# Patient Record
Sex: MALE | Race: WHITE | NOT HISPANIC OR LATINO | URBAN - METROPOLITAN AREA
[De-identification: names, ages, dates, MRNs, and addresses within clinical notes are randomized per-mention and may not be internally consistent; named-entity substitution may affect disease eponyms.]

---

## 2018-03-14 ENCOUNTER — APPOINTMENT (RX ONLY)
Dept: URBAN - METROPOLITAN AREA CLINIC 349 | Facility: CLINIC | Age: 58
Setting detail: DERMATOLOGY
End: 2018-03-14

## 2018-03-14 DIAGNOSIS — L81.2 FRECKLES: ICD-10-CM

## 2018-03-14 DIAGNOSIS — L57.0 ACTINIC KERATOSIS: ICD-10-CM

## 2018-03-14 DIAGNOSIS — D18.0 HEMANGIOMA: ICD-10-CM

## 2018-03-14 DIAGNOSIS — L57.8 OTHER SKIN CHANGES DUE TO CHRONIC EXPOSURE TO NONIONIZING RADIATION: ICD-10-CM

## 2018-03-14 DIAGNOSIS — D22 MELANOCYTIC NEVI: ICD-10-CM

## 2018-03-14 PROBLEM — D22.61 MELANOCYTIC NEVI OF RIGHT UPPER LIMB, INCLUDING SHOULDER: Status: ACTIVE | Noted: 2018-03-14

## 2018-03-14 PROBLEM — D22.5 MELANOCYTIC NEVI OF TRUNK: Status: ACTIVE | Noted: 2018-03-14

## 2018-03-14 PROBLEM — D18.01 HEMANGIOMA OF SKIN AND SUBCUTANEOUS TISSUE: Status: ACTIVE | Noted: 2018-03-14

## 2018-03-14 PROCEDURE — ? COUNSELING

## 2018-03-14 PROCEDURE — ? LIQUID NITROGEN

## 2018-03-14 PROCEDURE — 99202 OFFICE O/P NEW SF 15 MIN: CPT | Mod: 25

## 2018-03-14 PROCEDURE — 17003 DESTRUCT PREMALG LES 2-14: CPT

## 2018-03-14 PROCEDURE — 17000 DESTRUCT PREMALG LESION: CPT

## 2018-03-14 ASSESSMENT — LOCATION DETAILED DESCRIPTION DERM
LOCATION DETAILED: POSTERIOR MID-PARIETAL SCALP
LOCATION DETAILED: RIGHT PROXIMAL DORSAL FOREARM
LOCATION DETAILED: RIGHT RADIAL DORSAL HAND
LOCATION DETAILED: LEFT ANTERIOR SHOULDER
LOCATION DETAILED: LEFT LATERAL FOREHEAD
LOCATION DETAILED: LEFT CENTRAL MALAR CHEEK
LOCATION DETAILED: LEFT VENTRAL PROXIMAL FOREARM
LOCATION DETAILED: XIPHOID
LOCATION DETAILED: RIGHT CENTRAL MALAR CHEEK
LOCATION DETAILED: EPIGASTRIC SKIN
LOCATION DETAILED: LEFT CHIN
LOCATION DETAILED: LEFT INFERIOR UPPER BACK
LOCATION DETAILED: RIGHT DISTAL DORSAL FOREARM
LOCATION DETAILED: RIGHT MEDIAL FRONTAL SCALP
LOCATION DETAILED: RIGHT LATERAL SUPERIOR CHEST
LOCATION DETAILED: RIGHT MEDIAL INFERIOR CHEST
LOCATION DETAILED: LEFT ULNAR DORSAL HAND
LOCATION DETAILED: PERIUMBILICAL SKIN
LOCATION DETAILED: LEFT POSTERIOR SHOULDER
LOCATION DETAILED: LEFT PROXIMAL DORSAL FOREARM
LOCATION DETAILED: RIGHT SUPERIOR PARIETAL SCALP
LOCATION DETAILED: RIGHT POSTERIOR SHOULDER
LOCATION DETAILED: INFERIOR MID FOREHEAD
LOCATION DETAILED: LEFT SUPERIOR MEDIAL UPPER BACK
LOCATION DETAILED: LEFT MEDIAL UPPER BACK

## 2018-03-14 ASSESSMENT — LOCATION ZONE DERM
LOCATION ZONE: TRUNK
LOCATION ZONE: FACE
LOCATION ZONE: HAND
LOCATION ZONE: SCALP
LOCATION ZONE: ARM

## 2018-03-14 ASSESSMENT — LOCATION SIMPLE DESCRIPTION DERM
LOCATION SIMPLE: LEFT SHOULDER
LOCATION SIMPLE: RIGHT SCALP
LOCATION SIMPLE: INFERIOR FOREHEAD
LOCATION SIMPLE: RIGHT CHEEK
LOCATION SIMPLE: LEFT HAND
LOCATION SIMPLE: LEFT FOREARM
LOCATION SIMPLE: LEFT FOREHEAD
LOCATION SIMPLE: LEFT UPPER BACK
LOCATION SIMPLE: CHIN
LOCATION SIMPLE: RIGHT FOREARM
LOCATION SIMPLE: ABDOMEN
LOCATION SIMPLE: RIGHT SHOULDER
LOCATION SIMPLE: RIGHT HAND
LOCATION SIMPLE: LEFT CHEEK
LOCATION SIMPLE: POSTERIOR SCALP
LOCATION SIMPLE: SCALP
LOCATION SIMPLE: CHEST

## 2018-03-14 NOTE — PROCEDURE: LIQUID NITROGEN
Detail Level: Zone
Post-Care Instructions: I reviewed with the patient in detail post-care instructions. Patient is to wear sunprotection, and avoid picking at any of the treated lesions. Pt may apply Vaseline to crusted or scabbing areas.
Render Post-Care Instructions In Note?: no
Number Of Freeze-Thaw Cycles: 2 freeze-thaw cycles
Consent: The patient's consent was obtained including but not limited to risks of crusting, scabbing, blistering, scarring, darker or lighter pigmentary change, recurrence, incomplete removal and infection.
Duration Of Freeze Thaw-Cycle (Seconds): 3

## 2018-11-08 ENCOUNTER — APPOINTMENT (RX ONLY)
Dept: URBAN - METROPOLITAN AREA CLINIC 349 | Facility: CLINIC | Age: 58
Setting detail: DERMATOLOGY
End: 2018-11-08

## 2018-11-08 DIAGNOSIS — D22 MELANOCYTIC NEVI: ICD-10-CM

## 2018-11-08 DIAGNOSIS — D18.0 HEMANGIOMA: ICD-10-CM

## 2018-11-08 DIAGNOSIS — L57.8 OTHER SKIN CHANGES DUE TO CHRONIC EXPOSURE TO NONIONIZING RADIATION: ICD-10-CM

## 2018-11-08 DIAGNOSIS — L57.0 ACTINIC KERATOSIS: ICD-10-CM

## 2018-11-08 PROBLEM — D18.01 HEMANGIOMA OF SKIN AND SUBCUTANEOUS TISSUE: Status: ACTIVE | Noted: 2018-11-08

## 2018-11-08 PROBLEM — D22.39 MELANOCYTIC NEVI OF OTHER PARTS OF FACE: Status: ACTIVE | Noted: 2018-11-08

## 2018-11-08 PROBLEM — K21.9 GASTRO-ESOPHAGEAL REFLUX DISEASE WITHOUT ESOPHAGITIS: Status: ACTIVE | Noted: 2018-11-08

## 2018-11-08 PROBLEM — D22.5 MELANOCYTIC NEVI OF TRUNK: Status: ACTIVE | Noted: 2018-11-08

## 2018-11-08 PROBLEM — D22.61 MELANOCYTIC NEVI OF RIGHT UPPER LIMB, INCLUDING SHOULDER: Status: ACTIVE | Noted: 2018-11-08

## 2018-11-08 PROCEDURE — A4550 SURGICAL TRAYS: HCPCS

## 2018-11-08 PROCEDURE — ? PATHOLOGY BILLING

## 2018-11-08 PROCEDURE — 17000 DESTRUCT PREMALG LESION: CPT

## 2018-11-08 PROCEDURE — 11311 SHAVE SKIN LESION 0.6-1.0 CM: CPT | Mod: 59

## 2018-11-08 PROCEDURE — ? SHAVE REMOVAL

## 2018-11-08 PROCEDURE — 17003 DESTRUCT PREMALG LES 2-14: CPT

## 2018-11-08 PROCEDURE — ? LIQUID NITROGEN

## 2018-11-08 PROCEDURE — 88305 TISSUE EXAM BY PATHOLOGIST: CPT

## 2018-11-08 PROCEDURE — ? COUNSELING

## 2018-11-08 ASSESSMENT — LOCATION SIMPLE DESCRIPTION DERM
LOCATION SIMPLE: LEFT UPPER BACK
LOCATION SIMPLE: RIGHT CHEEK
LOCATION SIMPLE: CHIN
LOCATION SIMPLE: RIGHT FOREARM
LOCATION SIMPLE: LEFT CHEEK
LOCATION SIMPLE: RIGHT HAND
LOCATION SIMPLE: RIGHT FOREARM
LOCATION SIMPLE: LEFT FOREARM
LOCATION SIMPLE: LEFT HAND
LOCATION SIMPLE: LEFT FOREARM
LOCATION SIMPLE: INFERIOR FOREHEAD
LOCATION SIMPLE: CHEST
LOCATION SIMPLE: LEFT FOREHEAD
LOCATION SIMPLE: LEFT FOREHEAD
LOCATION SIMPLE: ABDOMEN
LOCATION SIMPLE: RIGHT SHOULDER

## 2018-11-08 ASSESSMENT — LOCATION ZONE DERM
LOCATION ZONE: ARM
LOCATION ZONE: TRUNK
LOCATION ZONE: HAND
LOCATION ZONE: FACE
LOCATION ZONE: FACE
LOCATION ZONE: ARM

## 2018-11-08 ASSESSMENT — LOCATION DETAILED DESCRIPTION DERM
LOCATION DETAILED: RIGHT POSTERIOR SHOULDER
LOCATION DETAILED: LEFT SUPERIOR MEDIAL UPPER BACK
LOCATION DETAILED: RIGHT PROXIMAL DORSAL FOREARM
LOCATION DETAILED: LEFT ULNAR DORSAL HAND
LOCATION DETAILED: RIGHT MEDIAL INFERIOR CHEST
LOCATION DETAILED: LEFT INFERIOR LATERAL FOREHEAD
LOCATION DETAILED: LEFT PROXIMAL DORSAL FOREARM
LOCATION DETAILED: LEFT MEDIAL UPPER BACK
LOCATION DETAILED: RIGHT RADIAL DORSAL HAND
LOCATION DETAILED: LEFT CHIN
LOCATION DETAILED: LEFT CENTRAL MALAR CHEEK
LOCATION DETAILED: LEFT PROXIMAL DORSAL FOREARM
LOCATION DETAILED: RIGHT CENTRAL MALAR CHEEK
LOCATION DETAILED: INFERIOR MID FOREHEAD
LOCATION DETAILED: LEFT INFERIOR UPPER BACK
LOCATION DETAILED: LEFT DISTAL DORSAL FOREARM
LOCATION DETAILED: RIGHT PROXIMAL DORSAL FOREARM
LOCATION DETAILED: LEFT DISTAL DORSAL FOREARM
LOCATION DETAILED: LEFT INFERIOR LATERAL FOREHEAD
LOCATION DETAILED: PERIUMBILICAL SKIN
LOCATION DETAILED: EPIGASTRIC SKIN

## 2018-11-08 NOTE — PROCEDURE: LIQUID NITROGEN
Duration Of Freeze Thaw-Cycle (Seconds): 3
Detail Level: Detailed
Post-Care Instructions: I reviewed with the patient in detail post-care instructions. Patient is to wear sunprotection, and avoid picking at any of the treated lesions. Pt may apply Vaseline to crusted or scabbing areas.
Consent: The patient's consent was obtained including but not limited to risks of crusting, scabbing, blistering, scarring, darker or lighter pigmentary change, recurrence, incomplete removal and infection.
Render Post-Care Instructions In Note?: no
Number Of Freeze-Thaw Cycles: 2 freeze-thaw cycles

## 2018-11-08 NOTE — PROCEDURE: PATHOLOGY BILLING
Immunohistochemistry ([id number] and [id number]) billing is not performed here. Please use the Immunohistochemistry Stain Billing plan to accomplish this. Immunohistochemistry ([id number] and [id number]) billing is not performed here. Please use the Immunohistochemistry Stain Billing plan to accomplish this.

## 2018-11-08 NOTE — PROCEDURE: SHAVE REMOVAL
Medical Necessity Information: It is in your best interest to select a reason for this procedure from the list below. All of these items fulfill various CMS LCD requirements except the new and changing color options.
Accession #: dr medrano read
Render Post-Care Instructions In Note?: yes
Biopsy Method: Dermablade
Detail Level: Detailed
Medical Necessity Clause: This procedure was medically necessary because the lesion that was treated was: changing
Consent was obtained from the patient. The risks and benefits to therapy were discussed in detail. Specifically, the risks of infection, scarring, bleeding, prolonged wound healing, incomplete removal, allergy to anesthesia, nerve injury and recurrence were addressed. Prior to the procedure, the treatment site was clearly identified and confirmed by the patient. All components of Universal Protocol/PAUSE Rule completed.
X Size Of Lesion In Cm (Optional): 0
Hemostasis: Electrocautery
Anesthesia Volume In Cc: 0.5
Notification Instructions: Patient will be notified of biopsy results. However, patient instructed to call the office if not contacted within 2 weeks.
Wound Care: Vaseline
Billing Type: Third-Party Bill
Post-Care Instructions: I reviewed with the patient in detail post-care instructions. Patient is to keep the biopsy site dry overnight, and then apply vaseline twice daily until healed. Patient may apply hydrogen peroxide soaks to remove any crusting. After the procedure, the patient was observed for 5-10 minutes and was oriented to person, place and time and demied feeling dizzy, queasy, and stated that they did not feel that they were going to faint.
Anesthesia Type: 2% lidocaine with epinephrine
Bill 53823 For Specimen Handling/Conveyance To Laboratory?: no
Size Of Lesion In Cm (Required): 0.8

## 2019-07-31 ENCOUNTER — APPOINTMENT (RX ONLY)
Dept: URBAN - METROPOLITAN AREA CLINIC 349 | Facility: CLINIC | Age: 59
Setting detail: DERMATOLOGY
End: 2019-07-31

## 2019-07-31 DIAGNOSIS — L82.1 OTHER SEBORRHEIC KERATOSIS: ICD-10-CM

## 2019-07-31 DIAGNOSIS — D22 MELANOCYTIC NEVI: ICD-10-CM

## 2019-07-31 DIAGNOSIS — L82.0 INFLAMED SEBORRHEIC KERATOSIS: ICD-10-CM

## 2019-07-31 DIAGNOSIS — D18.0 HEMANGIOMA: ICD-10-CM

## 2019-07-31 DIAGNOSIS — L57.0 ACTINIC KERATOSIS: ICD-10-CM

## 2019-07-31 PROBLEM — D22.61 MELANOCYTIC NEVI OF RIGHT UPPER LIMB, INCLUDING SHOULDER: Status: ACTIVE | Noted: 2019-07-31

## 2019-07-31 PROBLEM — L85.3 XEROSIS CUTIS: Status: ACTIVE | Noted: 2019-07-31

## 2019-07-31 PROBLEM — D22.5 MELANOCYTIC NEVI OF TRUNK: Status: ACTIVE | Noted: 2019-07-31

## 2019-07-31 PROBLEM — D18.01 HEMANGIOMA OF SKIN AND SUBCUTANEOUS TISSUE: Status: ACTIVE | Noted: 2019-07-31

## 2019-07-31 PROCEDURE — 17000 DESTRUCT PREMALG LESION: CPT | Mod: 59

## 2019-07-31 PROCEDURE — ? LIQUID NITROGEN

## 2019-07-31 PROCEDURE — 17110 DESTRUCTION B9 LES UP TO 14: CPT

## 2019-07-31 PROCEDURE — 17003 DESTRUCT PREMALG LES 2-14: CPT | Mod: 59

## 2019-07-31 PROCEDURE — ? COUNSELING

## 2019-07-31 PROCEDURE — 99213 OFFICE O/P EST LOW 20 MIN: CPT | Mod: 25

## 2019-07-31 ASSESSMENT — LOCATION SIMPLE DESCRIPTION DERM
LOCATION SIMPLE: LEFT THIGH
LOCATION SIMPLE: ABDOMEN
LOCATION SIMPLE: LEFT THIGH
LOCATION SIMPLE: ANTERIOR SCALP
LOCATION SIMPLE: LEFT UPPER BACK
LOCATION SIMPLE: LEFT SCALP
LOCATION SIMPLE: RIGHT SHOULDER
LOCATION SIMPLE: RIGHT SCALP
LOCATION SIMPLE: LEFT SCALP
LOCATION SIMPLE: SCALP

## 2019-07-31 ASSESSMENT — LOCATION DETAILED DESCRIPTION DERM
LOCATION DETAILED: LEFT CENTRAL FRONTAL SCALP
LOCATION DETAILED: MID-FRONTAL SCALP
LOCATION DETAILED: RIGHT SUPERIOR PARIETAL SCALP
LOCATION DETAILED: PERIUMBILICAL SKIN
LOCATION DETAILED: LEFT MEDIAL UPPER BACK
LOCATION DETAILED: RIGHT POSTERIOR SHOULDER
LOCATION DETAILED: LEFT ANTERIOR DISTAL THIGH
LOCATION DETAILED: LEFT INFERIOR UPPER BACK
LOCATION DETAILED: LEFT ANTERIOR DISTAL THIGH
LOCATION DETAILED: RIGHT MEDIAL FRONTAL SCALP
LOCATION DETAILED: LEFT MEDIAL FRONTAL SCALP
LOCATION DETAILED: EPIGASTRIC SKIN

## 2019-07-31 ASSESSMENT — LOCATION ZONE DERM
LOCATION ZONE: SCALP
LOCATION ZONE: TRUNK
LOCATION ZONE: ARM
LOCATION ZONE: LEG
LOCATION ZONE: SCALP
LOCATION ZONE: LEG

## 2019-07-31 NOTE — PROCEDURE: LIQUID NITROGEN
Post-Care Instructions: I reviewed with the patient in detail post-care instructions. Patient is to wear sunprotection, and avoid picking at any of the treated lesions. Pt may apply Vaseline to crusted or scabbing areas.
Render Note In Bullet Format When Appropriate: No
Duration Of Freeze Thaw-Cycle (Seconds): 2
Detail Level: Detailed
Medical Necessity Information: It is in your best interest to select a reason for this procedure from the list below. All of these items fulfill various CMS LCD requirements except the new and changing color options.
Consent: The patient's consent was obtained including but not limited to risks of crusting, scabbing, blistering, scarring, darker or lighter pigmentary change, recurrence, incomplete removal and infection.
Duration Of Freeze Thaw-Cycle (Seconds): 3
Include Z78.9 (Other Specified Conditions Influencing Health Status) As An Associated Diagnosis?: Yes
Medical Necessity Clause: This procedure was medically necessary because the lesions that were treated were:
Number Of Freeze-Thaw Cycles: 3 freeze-thaw cycles
Number Of Freeze-Thaw Cycles: 2 freeze-thaw cycles

## 2021-08-13 ENCOUNTER — HOSPITAL ENCOUNTER (OUTPATIENT)
Age: 61
Setting detail: OBSERVATION
Discharge: HOME | End: 2021-08-14
Attending: EMERGENCY MEDICINE | Admitting: INTERNAL MEDICINE
Payer: COMMERCIAL

## 2021-08-13 ENCOUNTER — APPOINTMENT (OUTPATIENT)
Dept: NON INVASIVE DIAGNOSTICS | Age: 61
End: 2021-08-13
Attending: NURSE PRACTITIONER
Payer: COMMERCIAL

## 2021-08-13 ENCOUNTER — APPOINTMENT (OUTPATIENT)
Dept: GENERAL RADIOLOGY | Age: 61
End: 2021-08-13
Attending: EMERGENCY MEDICINE
Payer: COMMERCIAL

## 2021-08-13 DIAGNOSIS — I10 ESSENTIAL HYPERTENSION: ICD-10-CM

## 2021-08-13 DIAGNOSIS — R07.9 CHEST PAIN, UNSPECIFIED TYPE: ICD-10-CM

## 2021-08-13 DIAGNOSIS — I24.9 ACS (ACUTE CORONARY SYNDROME) (HCC): Primary | ICD-10-CM

## 2021-08-13 LAB
ACT BLD: 252 SECS (ref 70–128)
ALBUMIN SERPL-MCNC: 3.9 G/DL (ref 3.5–5)
ALBUMIN/GLOB SERPL: 1.1 {RATIO} (ref 1.2–3.5)
ALP SERPL-CCNC: 74 U/L (ref 50–136)
ALT SERPL-CCNC: 27 U/L (ref 12–65)
ANION GAP SERPL CALC-SCNC: 8 MMOL/L (ref 7–16)
AST SERPL-CCNC: 15 U/L (ref 15–37)
ATRIAL RATE: 77 BPM
BASOPHILS # BLD: 0.1 K/UL (ref 0–0.2)
BASOPHILS NFR BLD: 1 % (ref 0–2)
BILIRUB SERPL-MCNC: 0.4 MG/DL (ref 0.2–1.1)
BNP SERPL-MCNC: 26 PG/ML (ref 5–125)
BUN SERPL-MCNC: 19 MG/DL (ref 6–23)
CALCIUM SERPL-MCNC: 9.1 MG/DL (ref 8.3–10.4)
CALCULATED P AXIS, ECG09: 37 DEGREES
CALCULATED R AXIS, ECG10: 49 DEGREES
CALCULATED T AXIS, ECG11: 53 DEGREES
CHLORIDE SERPL-SCNC: 106 MMOL/L (ref 98–107)
CO2 SERPL-SCNC: 26 MMOL/L (ref 21–32)
CREAT SERPL-MCNC: 1.36 MG/DL (ref 0.8–1.5)
DIAGNOSIS, 93000: NORMAL
DIFFERENTIAL METHOD BLD: NORMAL
ECHO AO ASC DIAM: 3.4 CM
ECHO AO ROOT DIAM: 3.4 CM
ECHO AV AREA PEAK VELOCITY: 2.64 CM2
ECHO AV AREA VTI: 2.82 CM2
ECHO AV AREA/BSA PEAK VELOCITY: 1.1 CM2/M2
ECHO AV AREA/BSA VTI: 1.2 CM2/M2
ECHO AV MEAN GRADIENT: 4 MMHG
ECHO AV PEAK GRADIENT: 7 MMHG
ECHO AV PEAK VELOCITY: 133 CM/S
ECHO AV VTI: 28.7 CM
ECHO EST RA PRESSURE: 3 MMHG
ECHO LA AREA 2C: 18.4 CM2
ECHO LA AREA 4C: 17.1 CM2
ECHO LA MAJOR AXIS: 5.47 CM
ECHO LA MINOR AXIS: 2.3 CM
ECHO LV E' LATERAL VELOCITY: 8.12 CM/S
ECHO LV E' SEPTAL VELOCITY: 8.61 CM/S
ECHO LV EDV A2C: 119 CM3
ECHO LV EDV A4C: 138 CM3
ECHO LV ESV A2C: 61.3 CM3
ECHO LV ESV A4C: 64.3 CM3
ECHO LV INTERNAL DIMENSION DIASTOLIC: 4.5 CM (ref 4.2–5.9)
ECHO LV INTERNAL DIMENSION SYSTOLIC: 3.4 CM
ECHO LV IVSD: 1.1 CM (ref 0.6–1)
ECHO LV MASS 2D: 175 G (ref 88–224)
ECHO LV MASS INDEX 2D: 73.5 G/M2 (ref 49–115)
ECHO LV POSTERIOR WALL DIASTOLIC: 1.1 CM (ref 0.6–1)
ECHO LVOT DIAM: 2.2 CM
ECHO LVOT PEAK GRADIENT: 3 MMHG
ECHO LVOT VTI: 21.3 CM
ECHO MV A VELOCITY: 87.3 CM/S
ECHO MV E VELOCITY: 72.3 CM/S
ECHO MV E/A RATIO: 0.83
ECHO MV E/E' LATERAL: 8.9
ECHO MV E/E' RATIO (AVERAGED): 8.65
ECHO MV E/E' SEPTAL: 8.4
ECHO RIGHT VENTRICULAR SYSTOLIC PRESSURE (RVSP): 33 MMHG
ECHO RV TAPSE: 2.97 CM (ref 1.5–2)
ECHO TV REGURGITANT MAX VELOCITY: 272 CM/S
ECHO TV REGURGITANT PEAK GRADIENT: 30 MMHG
EOSINOPHIL # BLD: 0.3 K/UL (ref 0–0.8)
EOSINOPHIL NFR BLD: 4 % (ref 0.5–7.8)
ERYTHROCYTE [DISTWIDTH] IN BLOOD BY AUTOMATED COUNT: 11.9 % (ref 11.9–14.6)
GLOBULIN SER CALC-MCNC: 3.6 G/DL (ref 2.3–3.5)
GLUCOSE SERPL-MCNC: 121 MG/DL (ref 65–100)
HCT VFR BLD AUTO: 45.5 % (ref 41.1–50.3)
HGB BLD-MCNC: 15.5 G/DL (ref 13.6–17.2)
IMM GRANULOCYTES # BLD AUTO: 0 K/UL (ref 0–0.5)
IMM GRANULOCYTES NFR BLD AUTO: 0 % (ref 0–5)
LIPASE SERPL-CCNC: 109 U/L (ref 73–393)
LYMPHOCYTES # BLD: 2.7 K/UL (ref 0.5–4.6)
LYMPHOCYTES NFR BLD: 39 % (ref 13–44)
MAGNESIUM SERPL-MCNC: 2.2 MG/DL (ref 1.8–2.4)
MCH RBC QN AUTO: 31.3 PG (ref 26.1–32.9)
MCHC RBC AUTO-ENTMCNC: 34.1 G/DL (ref 31.4–35)
MCV RBC AUTO: 91.7 FL (ref 79.6–97.8)
MONOCYTES # BLD: 0.7 K/UL (ref 0.1–1.3)
MONOCYTES NFR BLD: 11 % (ref 4–12)
NEUTS SEG # BLD: 3.1 K/UL (ref 1.7–8.2)
NEUTS SEG NFR BLD: 45 % (ref 43–78)
NRBC # BLD: 0 K/UL (ref 0–0.2)
P-R INTERVAL, ECG05: 152 MS
PLATELET # BLD AUTO: 252 K/UL (ref 150–450)
PMV BLD AUTO: 9.6 FL (ref 9.4–12.3)
POTASSIUM SERPL-SCNC: 3.9 MMOL/L (ref 3.5–5.1)
PROT SERPL-MCNC: 7.5 G/DL (ref 6.3–8.2)
Q-T INTERVAL, ECG07: 356 MS
QRS DURATION, ECG06: 88 MS
QTC CALCULATION (BEZET), ECG08: 402 MS
RBC # BLD AUTO: 4.96 M/UL (ref 4.23–5.6)
SODIUM SERPL-SCNC: 140 MMOL/L (ref 138–145)
TROPONIN-HIGH SENSITIVITY: 110.7 PG/ML (ref 0–14)
TROPONIN-HIGH SENSITIVITY: 2035.3 PG/ML (ref 0–14)
UFH PPP CHRO-ACNC: <0.1 IU/ML (ref 0.3–0.7)
VENTRICULAR RATE, ECG03: 77 BPM
WBC # BLD AUTO: 6.9 K/UL (ref 4.3–11.1)

## 2021-08-13 PROCEDURE — 83880 ASSAY OF NATRIURETIC PEPTIDE: CPT

## 2021-08-13 PROCEDURE — 74011250637 HC RX REV CODE- 250/637: Performed by: EMERGENCY MEDICINE

## 2021-08-13 PROCEDURE — 96376 TX/PRO/DX INJ SAME DRUG ADON: CPT

## 2021-08-13 PROCEDURE — C8929 TTE W OR WO FOL WCON,DOPPLER: HCPCS

## 2021-08-13 PROCEDURE — C1769 GUIDE WIRE: HCPCS | Performed by: INTERNAL MEDICINE

## 2021-08-13 PROCEDURE — 74011250636 HC RX REV CODE- 250/636: Performed by: INTERNAL MEDICINE

## 2021-08-13 PROCEDURE — 83690 ASSAY OF LIPASE: CPT

## 2021-08-13 PROCEDURE — 93571 IV DOP VEL&/PRESS C FLO 1ST: CPT | Performed by: INTERNAL MEDICINE

## 2021-08-13 PROCEDURE — 93458 L HRT ARTERY/VENTRICLE ANGIO: CPT | Performed by: INTERNAL MEDICINE

## 2021-08-13 PROCEDURE — 92921: CPT | Performed by: INTERNAL MEDICINE

## 2021-08-13 PROCEDURE — 85347 COAGULATION TIME ACTIVATED: CPT

## 2021-08-13 PROCEDURE — C1894 INTRO/SHEATH, NON-LASER: HCPCS | Performed by: INTERNAL MEDICINE

## 2021-08-13 PROCEDURE — 85025 COMPLETE CBC W/AUTO DIFF WBC: CPT

## 2021-08-13 PROCEDURE — C1887 CATHETER, GUIDING: HCPCS | Performed by: INTERNAL MEDICINE

## 2021-08-13 PROCEDURE — C1874 STENT, COATED/COV W/DEL SYS: HCPCS | Performed by: INTERNAL MEDICINE

## 2021-08-13 PROCEDURE — 96365 THER/PROPH/DIAG IV INF INIT: CPT

## 2021-08-13 PROCEDURE — 80053 COMPREHEN METABOLIC PANEL: CPT

## 2021-08-13 PROCEDURE — C1725 CATH, TRANSLUMIN NON-LASER: HCPCS | Performed by: INTERNAL MEDICINE

## 2021-08-13 PROCEDURE — 83735 ASSAY OF MAGNESIUM: CPT

## 2021-08-13 PROCEDURE — 74011250637 HC RX REV CODE- 250/637: Performed by: INTERNAL MEDICINE

## 2021-08-13 PROCEDURE — 36415 COLL VENOUS BLD VENIPUNCTURE: CPT

## 2021-08-13 PROCEDURE — 99218 HC RM OBSERVATION: CPT

## 2021-08-13 PROCEDURE — 77030042317 HC BND COMPR HEMSTAT -B: Performed by: INTERNAL MEDICINE

## 2021-08-13 PROCEDURE — 74011250636 HC RX REV CODE- 250/636: Performed by: NURSE PRACTITIONER

## 2021-08-13 PROCEDURE — 85520 HEPARIN ASSAY: CPT

## 2021-08-13 PROCEDURE — 77030012468 HC VLV BLEEDBK CNTRL ABBT -B: Performed by: INTERNAL MEDICINE

## 2021-08-13 PROCEDURE — 74011250637 HC RX REV CODE- 250/637: Performed by: NURSE PRACTITIONER

## 2021-08-13 PROCEDURE — 74011000250 HC RX REV CODE- 250: Performed by: INTERNAL MEDICINE

## 2021-08-13 PROCEDURE — 77030004558 HC CATH ANGI DX SUPR TORQ CARD -A: Performed by: INTERNAL MEDICINE

## 2021-08-13 PROCEDURE — 99152 MOD SED SAME PHYS/QHP 5/>YRS: CPT | Performed by: INTERNAL MEDICINE

## 2021-08-13 PROCEDURE — 99284 EMERGENCY DEPT VISIT MOD MDM: CPT

## 2021-08-13 PROCEDURE — 99220 PR INITIAL OBSERVATION CARE/DAY 70 MINUTES: CPT | Performed by: INTERNAL MEDICINE

## 2021-08-13 PROCEDURE — 92928 PRQ TCAT PLMT NTRAC ST 1 LES: CPT | Performed by: INTERNAL MEDICINE

## 2021-08-13 PROCEDURE — 93572 IV DOP VEL&/PRESS C FLO EA: CPT | Performed by: INTERNAL MEDICINE

## 2021-08-13 PROCEDURE — 71045 X-RAY EXAM CHEST 1 VIEW: CPT

## 2021-08-13 PROCEDURE — 84484 ASSAY OF TROPONIN QUANT: CPT

## 2021-08-13 PROCEDURE — 99153 MOD SED SAME PHYS/QHP EA: CPT | Performed by: INTERNAL MEDICINE

## 2021-08-13 PROCEDURE — 74011000636 HC RX REV CODE- 636: Performed by: INTERNAL MEDICINE

## 2021-08-13 PROCEDURE — 96374 THER/PROPH/DIAG INJ IV PUSH: CPT

## 2021-08-13 PROCEDURE — 93005 ELECTROCARDIOGRAM TRACING: CPT | Performed by: INTERNAL MEDICINE

## 2021-08-13 PROCEDURE — 74011250636 HC RX REV CODE- 250/636: Performed by: EMERGENCY MEDICINE

## 2021-08-13 DEVICE — STENT RONYX25038UX RESOLUTE ONYX 2.50X38
Type: IMPLANTABLE DEVICE | Status: FUNCTIONAL
Brand: RESOLUTE ONYX™

## 2021-08-13 RX ORDER — SODIUM CHLORIDE 9 MG/ML
75 INJECTION, SOLUTION INTRAVENOUS CONTINUOUS
Status: DISCONTINUED | OUTPATIENT
Start: 2021-08-13 | End: 2021-08-14 | Stop reason: HOSPADM

## 2021-08-13 RX ORDER — SODIUM CHLORIDE 0.9 % (FLUSH) 0.9 %
5-40 SYRINGE (ML) INJECTION EVERY 8 HOURS
Status: DISCONTINUED | OUTPATIENT
Start: 2021-08-13 | End: 2021-08-14 | Stop reason: HOSPADM

## 2021-08-13 RX ORDER — ACETAMINOPHEN 325 MG/1
650 TABLET ORAL
Status: DISCONTINUED | OUTPATIENT
Start: 2021-08-13 | End: 2021-08-14 | Stop reason: SDUPTHER

## 2021-08-13 RX ORDER — LORAZEPAM 1 MG/1
1 TABLET ORAL
Status: DISCONTINUED | OUTPATIENT
Start: 2021-08-13 | End: 2021-08-14 | Stop reason: HOSPADM

## 2021-08-13 RX ORDER — LIDOCAINE HYDROCHLORIDE 10 MG/ML
INJECTION INFILTRATION; PERINEURAL AS NEEDED
Status: DISCONTINUED | OUTPATIENT
Start: 2021-08-13 | End: 2021-08-13 | Stop reason: HOSPADM

## 2021-08-13 RX ORDER — MORPHINE SULFATE 2 MG/ML
1 INJECTION, SOLUTION INTRAMUSCULAR; INTRAVENOUS
Status: DISCONTINUED | OUTPATIENT
Start: 2021-08-13 | End: 2021-08-14 | Stop reason: HOSPADM

## 2021-08-13 RX ORDER — ACETAMINOPHEN 325 MG/1
650 TABLET ORAL
Status: DISCONTINUED | OUTPATIENT
Start: 2021-08-13 | End: 2021-08-14 | Stop reason: HOSPADM

## 2021-08-13 RX ORDER — HEPARIN SODIUM 5000 [USP'U]/ML
60 INJECTION, SOLUTION INTRAVENOUS; SUBCUTANEOUS ONCE
Status: COMPLETED | OUTPATIENT
Start: 2021-08-13 | End: 2021-08-13

## 2021-08-13 RX ORDER — GUAIFENESIN 100 MG/5ML
81 LIQUID (ML) ORAL DAILY
Status: DISCONTINUED | OUTPATIENT
Start: 2021-08-14 | End: 2021-08-14 | Stop reason: HOSPADM

## 2021-08-13 RX ORDER — ATORVASTATIN CALCIUM 80 MG/1
80 TABLET, FILM COATED ORAL DAILY
Status: DISCONTINUED | OUTPATIENT
Start: 2021-08-14 | End: 2021-08-14 | Stop reason: HOSPADM

## 2021-08-13 RX ORDER — SODIUM CHLORIDE 0.9 % (FLUSH) 0.9 %
5-10 SYRINGE (ML) INJECTION EVERY 8 HOURS
Status: DISCONTINUED | OUTPATIENT
Start: 2021-08-13 | End: 2021-08-14 | Stop reason: HOSPADM

## 2021-08-13 RX ORDER — METOPROLOL TARTRATE 25 MG/1
25 TABLET, FILM COATED ORAL EVERY 12 HOURS
Status: DISCONTINUED | OUTPATIENT
Start: 2021-08-13 | End: 2021-08-14 | Stop reason: HOSPADM

## 2021-08-13 RX ORDER — SODIUM CHLORIDE 0.9 % (FLUSH) 0.9 %
5-40 SYRINGE (ML) INJECTION AS NEEDED
Status: DISCONTINUED | OUTPATIENT
Start: 2021-08-13 | End: 2021-08-14 | Stop reason: HOSPADM

## 2021-08-13 RX ORDER — HEPARIN SODIUM 10000 [USP'U]/ML
INJECTION, SOLUTION INTRAVENOUS; SUBCUTANEOUS AS NEEDED
Status: DISCONTINUED | OUTPATIENT
Start: 2021-08-13 | End: 2021-08-13 | Stop reason: HOSPADM

## 2021-08-13 RX ORDER — GUAIFENESIN 100 MG/5ML
81 LIQUID (ML) ORAL DAILY
Status: DISCONTINUED | OUTPATIENT
Start: 2021-08-14 | End: 2021-08-14

## 2021-08-13 RX ORDER — SODIUM CHLORIDE 0.9 % (FLUSH) 0.9 %
5-10 SYRINGE (ML) INJECTION AS NEEDED
Status: DISCONTINUED | OUTPATIENT
Start: 2021-08-13 | End: 2021-08-14 | Stop reason: HOSPADM

## 2021-08-13 RX ORDER — SODIUM CHLORIDE 9 MG/ML
75 INJECTION, SOLUTION INTRAVENOUS CONTINUOUS
Status: DISCONTINUED | OUTPATIENT
Start: 2021-08-13 | End: 2021-08-14

## 2021-08-13 RX ORDER — NITROGLYCERIN 0.4 MG/1
0.4 TABLET SUBLINGUAL
Status: DISCONTINUED | OUTPATIENT
Start: 2021-08-13 | End: 2021-08-14 | Stop reason: HOSPADM

## 2021-08-13 RX ORDER — ONDANSETRON 2 MG/ML
4 INJECTION INTRAMUSCULAR; INTRAVENOUS
Status: DISCONTINUED | OUTPATIENT
Start: 2021-08-13 | End: 2021-08-14 | Stop reason: HOSPADM

## 2021-08-13 RX ORDER — GUAIFENESIN 100 MG/5ML
324 LIQUID (ML) ORAL
Status: COMPLETED | OUTPATIENT
Start: 2021-08-13 | End: 2021-08-13

## 2021-08-13 RX ORDER — MORPHINE SULFATE 2 MG/ML
2 INJECTION, SOLUTION INTRAMUSCULAR; INTRAVENOUS
Status: DISCONTINUED | OUTPATIENT
Start: 2021-08-13 | End: 2021-08-14 | Stop reason: HOSPADM

## 2021-08-13 RX ORDER — HEPARIN SODIUM 200 [USP'U]/100ML
INJECTION, SOLUTION INTRAVENOUS
Status: COMPLETED | OUTPATIENT
Start: 2021-08-13 | End: 2021-08-13

## 2021-08-13 RX ORDER — FENTANYL CITRATE 50 UG/ML
INJECTION, SOLUTION INTRAMUSCULAR; INTRAVENOUS AS NEEDED
Status: DISCONTINUED | OUTPATIENT
Start: 2021-08-13 | End: 2021-08-13 | Stop reason: HOSPADM

## 2021-08-13 RX ORDER — HEPARIN SODIUM 5000 [USP'U]/ML
40 INJECTION, SOLUTION INTRAVENOUS; SUBCUTANEOUS ONCE
Status: DISPENSED | OUTPATIENT
Start: 2021-08-13 | End: 2021-08-14

## 2021-08-13 RX ORDER — HYDROCODONE BITARTRATE AND ACETAMINOPHEN 5; 325 MG/1; MG/1
1 TABLET ORAL
Status: DISCONTINUED | OUTPATIENT
Start: 2021-08-13 | End: 2021-08-14 | Stop reason: HOSPADM

## 2021-08-13 RX ORDER — MIDAZOLAM HYDROCHLORIDE 1 MG/ML
INJECTION, SOLUTION INTRAMUSCULAR; INTRAVENOUS AS NEEDED
Status: DISCONTINUED | OUTPATIENT
Start: 2021-08-13 | End: 2021-08-13 | Stop reason: HOSPADM

## 2021-08-13 RX ORDER — HEPARIN SODIUM 5000 [USP'U]/100ML
12-25 INJECTION, SOLUTION INTRAVENOUS
Status: DISCONTINUED | OUTPATIENT
Start: 2021-08-13 | End: 2021-08-13

## 2021-08-13 RX ADMIN — Medication 10 ML: at 20:46

## 2021-08-13 RX ADMIN — METOPROLOL TARTRATE 25 MG: 25 TABLET, FILM COATED ORAL at 20:37

## 2021-08-13 RX ADMIN — HEPARIN SODIUM 5600 UNITS: 5000 INJECTION INTRAVENOUS; SUBCUTANEOUS at 05:36

## 2021-08-13 RX ADMIN — SODIUM CHLORIDE 75 ML/HR: 900 INJECTION, SOLUTION INTRAVENOUS at 05:36

## 2021-08-13 RX ADMIN — Medication 5 ML: at 08:04

## 2021-08-13 RX ADMIN — METOPROLOL TARTRATE 25 MG: 25 TABLET, FILM COATED ORAL at 05:36

## 2021-08-13 RX ADMIN — HEPARIN SODIUM AND DEXTROSE 12 UNITS/KG/HR: 5000; 5 INJECTION INTRAVENOUS at 08:02

## 2021-08-13 RX ADMIN — PERFLUTREN 1 ML: 6.52 INJECTION, SUSPENSION INTRAVENOUS at 09:24

## 2021-08-13 RX ADMIN — Medication 5 ML: at 13:27

## 2021-08-13 RX ADMIN — Medication 10 ML: at 20:45

## 2021-08-13 RX ADMIN — Medication 5 ML: at 16:52

## 2021-08-13 RX ADMIN — ASPIRIN 324 MG: 81 TABLET, CHEWABLE ORAL at 05:54

## 2021-08-13 RX ADMIN — HEPARIN SODIUM AND DEXTROSE 12 UNITS/KG/HR: 5000; 5 INJECTION INTRAVENOUS at 05:37

## 2021-08-13 NOTE — ED NOTES
TRANSFER - OUT REPORT:    Verbal report given to RN on Nile Salamanca  being transferred to [facility] for routine progression of care       Report consisted of patients Situation, Background, Assessment and   Recommendations(SBAR). Information from the following report(s) SBAR, ED Summary and MAR was reviewed with the receiving nurse. Lines:   Peripheral IV 08/13/21 Right Antecubital (Active)        Opportunity for questions and clarification was provided.       Patient transported with:   Monitor  Registered Nurse

## 2021-08-13 NOTE — Clinical Note
Balloon inserted. Balloon inflated using multiple inflation technique. Lesion #1: Pressure = 10 oziel; Duration = 15 sec. Inflation 2: Pressure = 10 oziel; Duration = 10 sec. LATERAL RAMUS .  Asha Canela

## 2021-08-13 NOTE — Clinical Note
TRANSFER - IN REPORT:     Verbal report received from: tele. Report consisted of patient's Situation, Background, Assessment and   Recommendations(SBAR). Opportunity for questions and clarification was provided. Assessment completed upon patient's arrival to unit and care assumed. Patient transported with a Registered Nurse.

## 2021-08-13 NOTE — ED PROVIDER NOTES
Saniya Argueta is a 64 y.o. male seen on 8/13/2021 in the [facility] EMERGENCY DEPT in room ER04/04. Chief Complaint   Patient presents with    Neck Pain     HPI: 21-year-old  male presented emergency department with complaints of anterior neck and jaw pain that woke him from his sleep tonight about an hour ago. Patient has been having similar symptoms that have been progressive over the past few weeks that have been associated with exertion however patient has not ever had these symptoms while at rest.  Patient was seen by [place] cardiology yesterday for his exertional symptoms. Patient's EKG was unremarkable in the cardiology office and patient was scheduled for outpatient labs and stress test.  Patient has not had any of these done as they were just ordered yesterday. Patient states that his symptoms have resolved currently and he is not having any pain. Patient states that when he gets his symptoms he gets mildly short of breath and nauseous. Patient does state that he also has reflux and that has been worse as well. He denies any other symptoms. Patient with significant family history of heart disease. He denies any recent illnesses including but not limited to fevers, chills, vomiting, abdominal pain, leg swelling or any other concerns. Historian: Patient    REVIEW OF SYSTEMS     Review of Systems   Constitutional: Negative. HENT: Negative. Respiratory: Positive for chest tightness and shortness of breath. Cardiovascular: Positive for chest pain. Gastrointestinal: Positive for nausea. Genitourinary: Negative. Musculoskeletal: Negative. Skin: Negative. Neurological: Negative. Psychiatric/Behavioral: Negative. All other systems reviewed and are negative. PAST MEDICAL HISTORY     No past medical history on file. No past surgical history on file.   Social History     Socioeconomic History    Marital status:      Spouse name: Not on file    Number of children: Not on file    Years of education: Not on file    Highest education level: Not on file   Tobacco Use    Smoking status: Never Smoker    Smokeless tobacco: Never Used   Substance and Sexual Activity    Alcohol use: Yes     Social Determinants of Health     Financial Resource Strain:     Difficulty of Paying Living Expenses:    Food Insecurity:     Worried About Running Out of Food in the Last Year:     [address] in the Last Year:    Transportation Needs:     Lack of Transportation (Medical):  Lack of Transportation (Non-Medical):    Physical Activity:     Days of Exercise per Week:     Minutes of Exercise per Session:    Stress:     Feeling of Stress :    Social Connections:     Frequency of Communication with Friends and Family:     Frequency of Social Gatherings with Friends and Family:     Attends Rastafarian Services:     Active Member of Clubs or Organizations:     Attends Club or Organization Meetings:     Marital Status:      None     No Known Allergies     PHYSICAL EXAM       Vitals:    08/13/21 0346 08/13/21 0351   BP: (!) 162/88 (!) 162/88   Pulse: 87 88   Resp:  14   Temp:  97.7 °F (36.5 °C)   SpO2: 96% 96%    Vital signs were reviewed. Physical Exam  Vitals and nursing note reviewed. Constitutional:       General: He is not in acute distress. Appearance: Normal appearance. He is not ill-appearing or toxic-appearing. HENT:      Head: Normocephalic and atraumatic. Nose: Nose normal.      Mouth/Throat:      Mouth: Mucous membranes are moist.   Eyes:      Extraocular Movements: Extraocular movements intact. Pupils: Pupils are equal, round, and reactive to light. Cardiovascular:      Rate and Rhythm: Normal rate and regular rhythm. Pulses: Normal pulses. Pulmonary:      Effort: Pulmonary effort is normal.      Breath sounds: Normal breath sounds. Abdominal:      Palpations: Abdomen is soft. Tenderness: There is no abdominal tenderness. There is no guarding or rebound. Musculoskeletal:         General: Normal range of motion. Cervical back: Normal range of motion. Skin:     General: Skin is warm and dry. Neurological:      General: No focal deficit present. Mental Status: He is alert and oriented to person, place, and time. Psychiatric:         Mood and Affect: Mood normal.         Behavior: Behavior normal.         Thought Content: Thought content normal.         Judgment: Judgment normal.          MEDICAL DECISION MAKING     ED Course:    Orders Placed This Encounter    XR Chest Port (check if patient is roomed and on cardiac monitor)    Troponin - High Sensitivity    Troponin 2 Hour Repeat    CBC    CMP    LIPASE    Magnesium    NT-PRO BNP    Cardiac Monitoring    PULSE OXIMETRY CONTINUOUS    NURSING-MISCELLANEOUS: Please draw blue top tube and send to lab ONE TIME    CRITICAL CARE (ASAP ONLY)    EKG    SALINE LOCK IV ONE TIME Routine    INSERT PERIPHERAL IV ONE TIME STAT    sodium chloride (NS) flush 5-10 mL    sodium chloride (NS) flush 5-10 mL    heparin (porcine) injection 5,600 Units    heparin 25,000 units in dextrose 500 mL infusion    aspirin chewable tablet 324 mg    0.9% sodium chloride infusion    metoprolol tartrate (LOPRESSOR) tablet 25 mg    INITIAL PHYSICIAN ORDER: OBSERVATION/OUTPATIENT IN A BED OBSERVATION; Telemetry;  Yes; chest pain     Recent Results (from the past 8 hour(s))   TROPONIN-HIGH SENSITIVITY    Collection Time: 08/13/21  3:56 AM   Result Value Ref Range    Troponin-High Sensitivity 110.7 (HH) 0 - 14 pg/mL   CBC WITH AUTOMATED DIFF    Collection Time: 08/13/21  3:56 AM   Result Value Ref Range    WBC 6.9 4.3 - 11.1 K/uL    RBC 4.96 4.23 - 5.6 M/uL    HGB 15.5 13.6 - 17.2 g/dL    HCT 45.5 41.1 - 50.3 %    MCV 91.7 79.6 - 97.8 FL    MCH 31.3 26.1 - 32.9 PG    MCHC 34.1 31.4 - 35.0 g/dL    RDW 11.9 11.9 - 14.6 %    PLATELET 980 150 - 450 K/uL    MPV 9.6 9.4 - 12.3 FL    ABSOLUTE NRBC 0.00 0.0 - 0.2 K/uL    DF AUTOMATED      NEUTROPHILS 45 43 - 78 %    LYMPHOCYTES 39 13 - 44 %    MONOCYTES 11 4.0 - 12.0 %    EOSINOPHILS 4 0.5 - 7.8 %    BASOPHILS 1 0.0 - 2.0 %    IMMATURE GRANULOCYTES 0 0.0 - 5.0 %    ABS. NEUTROPHILS 3.1 1.7 - 8.2 K/UL    ABS. LYMPHOCYTES 2.7 0.5 - 4.6 K/UL    ABS. MONOCYTES 0.7 0.1 - 1.3 K/UL    ABS. EOSINOPHILS 0.3 0.0 - 0.8 K/UL    ABS. BASOPHILS 0.1 0.0 - 0.2 K/UL    ABS. IMM. GRANS. 0.0 0.0 - 0.5 K/UL   METABOLIC PANEL, COMPREHENSIVE    Collection Time: 08/13/21  3:56 AM   Result Value Ref Range    Sodium 140 138 - 145 mmol/L    Potassium 3.9 3.5 - 5.1 mmol/L    Chloride 106 98 - 107 mmol/L    CO2 26 21 - 32 mmol/L    Anion gap 8 7 - 16 mmol/L    Glucose 121 (H) 65 - 100 mg/dL    BUN 19 6 - 23 MG/DL    Creatinine 1.36 0.8 - 1.5 MG/DL    GFR est AA >60 >60 ml/min/1.73m2    GFR est non-AA 57 (L) >60 ml/min/1.73m2    Calcium 9.1 8.3 - 10.4 MG/DL    Bilirubin, total 0.4 0.2 - 1.1 MG/DL    ALT (SGPT) 27 12 - 65 U/L    AST (SGOT) 15 15 - 37 U/L    Alk. phosphatase 74 50 - 136 U/L    Protein, total 7.5 6.3 - 8.2 g/dL    Albumin 3.9 3.5 - 5.0 g/dL    Globulin 3.6 (H) 2.3 - 3.5 g/dL    A-G Ratio 1.1 (L) 1.2 - 3.5     LIPASE    Collection Time: 08/13/21  3:56 AM   Result Value Ref Range    Lipase 109 73 - 393 U/L   MAGNESIUM    Collection Time: 08/13/21  3:56 AM   Result Value Ref Range    Magnesium 2.2 1.8 - 2.4 mg/dL   NT-PRO BNP    Collection Time: 08/13/21  3:56 AM   Result Value Ref Range    NT pro-BNP 26 5 - 125 PG/ML     XR CHEST PORT    Result Date: 8/13/2021  EXAM: XR CHEST PORT HISTORY: chest pain. TECHNIQUE: Frontal chest. COMPARISON: None available. FINDINGS: The cardiac silhouette, mediastinum, and pulmonary vasculature are within normal limits. There is no consolidation, pleural effusion, or pneumothorax. No significant osseous abnormalities are observed. No evidence of an acute intrathoracic process.      EKG interpretation: Rate 77. Normal sinus rhythm. Normal DE and QT intervals. Nonspecific ST abnormality. MDM  Number of Diagnoses or Management Options  ACS (acute coronary syndrome) [facility])  Diagnosis management comments: 21-year-old male presented emergency department with anterior neck and jaw pain that occurred while sleeping tonight. I believe this is patient's anginal equivalent. Patient has been having exertional symptoms that are similar however this the first time it happened at rest.  Patient's troponins are mildly elevated. Patient is asymptomatic upon his arrival to the emergency department. Discussed with cardiology and patient will be admitted to the hospital for further treatment evaluation. Patient started on heparin drip. Patient likely with heart cath later today.        Amount and/or Complexity of Data Reviewed  Clinical lab tests: ordered and reviewed  Tests in the radiology section of CPT®: ordered and reviewed  Decide to obtain previous medical records or to obtain history from someone other than the patient: yes  Review and summarize past medical records: yes  Discuss the patient with other providers: yes  Independent visualization of images, tracings, or specimens: yes    Patient Progress  Patient progress: stable    Critical Care  Performed by: Liz Jolley DO  Authorized by: Liz Jolley DO     Critical care provider statement:     Critical care time (minutes):  33    Critical care was necessary to treat or prevent imminent or life-threatening deterioration of the following conditions:  Circulatory failure and cardiac failure    Critical care was time spent personally by me on the following activities:  Blood draw for specimens, development of treatment plan with patient or surrogate, discussions with consultants, evaluation of patient's response to treatment, examination of patient, obtaining history from patient or surrogate, review of old charts, re-evaluation of patient's condition, pulse oximetry, ordering and review of radiographic studies, ordering and review of laboratory studies and ordering and performing treatments and interventions  Comments:      ACS requiring heparin bolus/drip, cardiology consult and admission        Disposition: Admitted  Diagnosis:     ICD-10-CM ICD-9-CM   1. ACS (acute coronary syndrome) (HCC)  I24.9 411.1     ____________________________________________________________________  A portion of this note was generated using voice recognition dictation software. While the note has been reviewed for accuracy, please note certain words and phrases may not be transcribed as intended and some grammatical and/or typographical errors may be present.

## 2021-08-13 NOTE — PROGRESS NOTES
TRANSFER - IN REPORT:    Verbal report received from Phong Lawson 145  on Elise Self being received from ED () for routine progression of care. Report consisted of patients Situation, Background, Assessment and Recommendations(SBAR). Information from the following report(s) SBAR, Kardex, Procedure Summary, MAR and Recent Results was reviewed. Opportunity for questions and clarification was provided. Assessment completed upon patients arrival to unit and care assumed. Patient received to room 329. Patient connected to monitor and assessment completed. Plan of care reviewed. Patient oriented to room and call light. Patient aware to use call light to communicate any chest pain or needs. Admission skin assessment completed with second RN and reveals the following:  PT showed no sign of skin breakdown to groin, back, sacrum or groin. Pt denied any wounds or open sores. Skin is in tact, heels are intact bilaterally.

## 2021-08-13 NOTE — ROUTINE PROCESS
Bedside and Verbal shift change report given to Grant Stern RN (oncoming nurse) by self Daylin arreola). Report included the following information SBAR, Kardex, ED Summary, Procedure Summary, Intake/Output, MAR and Recent Results.

## 2021-08-13 NOTE — Clinical Note
Balloon inserted. Balloon inflated using single inflation technique. Lesion #1: Pressure = 16 oziel; Duration = 18 sec.

## 2021-08-13 NOTE — ROUTINE PROCESS
TRANSFER - IN REPORT:    Verbal report received from [employer] (name) on Evangelist Aus  being received from [facility] (unit) for routine progression of care      Report consisted of patients Situation, Background, Assessment and   Recommendations(SBAR). Information from the following report(s) SBAR, Kardex, Procedure Summary, MAR and Recent Results was reviewed with the receiving nurse. Opportunity for questions and clarification was provided. Assessment completed upon patients arrival to unit and care assumed.

## 2021-08-13 NOTE — Clinical Note
Balloon inserted. Balloon inflated using single inflation technique. Lesion #1: Pressure = 16 oziel; Duration = 16 sec.

## 2021-08-13 NOTE — Clinical Note
TRANSFER - OUT REPORT:     Verbal report given to: CPRU. Report consisted of patient's Situation, Background, Assessment and   Recommendations(SBAR). Opportunity for questions and clarification was provided. Patient transported with a Registered Nurse. Patient transported to: recovery.

## 2021-08-13 NOTE — Clinical Note
Balloon inserted. Balloon inflated using multiple inflation technique. Lesion #1: Pressure = 6 oziel; Duration = 15 sec. Inflation 2: Pressure = 8 oziel; Duration = 25 sec.

## 2021-08-13 NOTE — H&P
[facility] Cardiology History & Physical      Date of  Admission: 8/13/2021  3:43 AM     Primary Care Physician: Unknown  Primary Cardiologist: Dr. Esther Barrientos  Admitting Physician: Dr. Abdoulaye Vega    CC: chest and neck pain    HPI:  Loralie Sandifer is a 64 y.o. male with no significant medical history who presented to the ER with complaints of neck pain with radiation to chest. Patient was seen in the office yesterday with intermittent exertional neck and chest pain over the past several months. Due to his significant family history, stress testing was ordered as outpatient. Tonight while sleeping, patient developed neck pain with associated jaw pain. Denies shortness of breath, nausea, vomiting or diaphoresis. Upon arrival to the ER, EKG showed SR without acute ST/T wave changes. Initial hs-troponin was elevated at 110.7. While in the ER, patient was given 324mg ASA and IV heparin. He is currently symptom free. No past medical history on file. No past surgical history on file. No Known Allergies   Social History     Socioeconomic History    Marital status:      Spouse name: Not on file    Number of children: Not on file    Years of education: Not on file    Highest education level: Not on file   Occupational History    Not on file   Tobacco Use    Smoking status: Never Smoker    Smokeless tobacco: Never Used   Substance and Sexual Activity    Alcohol use: Yes    Drug use: Not on file    Sexual activity: Not on file   Other Topics Concern    Not on file   Social History Narrative    Not on file     Social Determinants of Health     Financial Resource Strain:     Difficulty of Paying Living Expenses:    Food Insecurity:     Worried About Running Out of Food in the Last Year:     [address] in the Last Year:    Transportation Needs:     Lack of Transportation (Medical):      Lack of Transportation (Non-Medical):    Physical Activity:     Days of Exercise per Week:     Minutes of Exercise per Session:    Stress:     Feeling of Stress :    Social Connections:     Frequency of Communication with Friends and Family:     Frequency of Social Gatherings with Friends and Family:     Attends Baptism Services:     Active Member of Clubs or Organizations:     Attends Club or Organization Meetings:     Marital Status:    Intimate Partner Violence:     Fear of Current or Ex-Partner:     Emotionally Abused:     Physically Abused:     Sexually Abused:      No family history on file. Current Facility-Administered Medications   Medication Dose Route Frequency    sodium chloride (NS) flush 5-10 mL  5-10 mL IntraVENous Q8H    sodium chloride (NS) flush 5-10 mL  5-10 mL IntraVENous PRN    heparin (porcine) injection 5,600 Units  60 Units/kg (Adjusted) IntraVENous ONCE    heparin 25,000 units in dextrose 500 mL infusion  12-25 Units/kg/hr (Adjusted) IntraVENous TITRATE    aspirin chewable tablet 324 mg  324 mg Oral NOW    0.9% sodium chloride infusion  75 mL/hr IntraVENous CONTINUOUS    metoprolol tartrate (LOPRESSOR) tablet 25 mg  25 mg Oral Q12H     No current outpatient medications on file. Review of Systems    Review of Systems   Constitutional: Negative. HENT: Negative. Eyes: Negative. Respiratory: Negative. Cardiovascular: Positive for chest pain. Gastrointestinal: Negative. Genitourinary: Negative. Musculoskeletal: Positive for neck pain. Skin: Negative. Neurological: Negative. Endo/Heme/Allergies: Negative. Psychiatric/Behavioral: Negative. Subjective:     Visit Vitals  BP (!) 162/88 (BP 1 Location: Left upper arm, BP Patient Position: At rest)   Pulse 88   Temp 97.7 °F (36.5 °C)   Resp 14   Ht 6' (1.829 m)   Wt 117.9 kg (260 lb)   SpO2 96%   BMI 35.26 kg/m²     Physical Exam  HENT:      Mouth/Throat:      Mouth: Mucous membranes are moist.   Eyes:      Pupils: Pupils are equal, round, and reactive to light.    Cardiovascular: Rate and Rhythm: Normal rate and regular rhythm. Heart sounds: Normal heart sounds. Pulmonary:      Breath sounds: Normal breath sounds. Abdominal:      General: Bowel sounds are normal.   Musculoskeletal:         General: No swelling. Skin:     General: Skin is warm and dry. Neurological:      Mental Status: He is alert and oriented to person, place, and time. Psychiatric:         Mood and Affect: Mood normal.         Cardiographics  Telemetry: normal sinus rhythm  ECG: normal sinus rhythm  Echocardiogram: ordered    Labs:   Recent Results (from the past 24 hour(s))   TROPONIN-HIGH SENSITIVITY    Collection Time: 08/13/21  3:56 AM   Result Value Ref Range    Troponin-High Sensitivity 110.7 (HH) 0 - 14 pg/mL   CBC WITH AUTOMATED DIFF    Collection Time: 08/13/21  3:56 AM   Result Value Ref Range    WBC 6.9 4.3 - 11.1 K/uL    RBC 4.96 4.23 - 5.6 M/uL    HGB 15.5 13.6 - 17.2 g/dL    HCT 45.5 41.1 - 50.3 %    MCV 91.7 79.6 - 97.8 FL    MCH 31.3 26.1 - 32.9 PG    MCHC 34.1 31.4 - 35.0 g/dL    RDW 11.9 11.9 - 14.6 %    PLATELET 724 730 - 909 K/uL    MPV 9.6 9.4 - 12.3 FL    ABSOLUTE NRBC 0.00 0.0 - 0.2 K/uL    DF AUTOMATED      NEUTROPHILS 45 43 - 78 %    LYMPHOCYTES 39 13 - 44 %    MONOCYTES 11 4.0 - 12.0 %    EOSINOPHILS 4 0.5 - 7.8 %    BASOPHILS 1 0.0 - 2.0 %    IMMATURE GRANULOCYTES 0 0.0 - 5.0 %    ABS. NEUTROPHILS 3.1 1.7 - 8.2 K/UL    ABS. LYMPHOCYTES 2.7 0.5 - 4.6 K/UL    ABS. MONOCYTES 0.7 0.1 - 1.3 K/UL    ABS. EOSINOPHILS 0.3 0.0 - 0.8 K/UL    ABS. BASOPHILS 0.1 0.0 - 0.2 K/UL    ABS. IMM.  GRANS. 0.0 0.0 - 0.5 K/UL   METABOLIC PANEL, COMPREHENSIVE    Collection Time: 08/13/21  3:56 AM   Result Value Ref Range    Sodium 140 138 - 145 mmol/L    Potassium 3.9 3.5 - 5.1 mmol/L    Chloride 106 98 - 107 mmol/L    CO2 26 21 - 32 mmol/L    Anion gap 8 7 - 16 mmol/L    Glucose 121 (H) 65 - 100 mg/dL    BUN 19 6 - 23 MG/DL    Creatinine 1.36 0.8 - 1.5 MG/DL    GFR est AA >60 >60 ml/min/1.73m2    GFR est non-AA 57 (L) >60 ml/min/1.73m2    Calcium 9.1 8.3 - 10.4 MG/DL    Bilirubin, total 0.4 0.2 - 1.1 MG/DL    ALT (SGPT) 27 12 - 65 U/L    AST (SGOT) 15 15 - 37 U/L    Alk. phosphatase 74 50 - 136 U/L    Protein, total 7.5 6.3 - 8.2 g/dL    Albumin 3.9 3.5 - 5.0 g/dL    Globulin 3.6 (H) 2.3 - 3.5 g/dL    A-G Ratio 1.1 (L) 1.2 - 3.5     LIPASE    Collection Time: 08/13/21  3:56 AM   Result Value Ref Range    Lipase 109 73 - 393 U/L   MAGNESIUM    Collection Time: 08/13/21  3:56 AM   Result Value Ref Range    Magnesium 2.2 1.8 - 2.4 mg/dL   NT-PRO BNP    Collection Time: 08/13/21  3:56 AM   Result Value Ref Range    NT pro-BNP 26 5 - 125 PG/ML       Patient has been seen and examined by Dr. Mart Busch and he agrees with the following assessment and plan:     Assessment/Plan:        Chest pain -- admit to telemetry. Follow serial cardiac enzymes. Given ASA in the ER, continue daily 81 mg. Start lopressor 25mg BID with first dose now. NPO with IV hydration. Plan for LHC with possible PCI later today. Check echocardiogram and lipid panel today. HTN (hypertension) -- start lopressor 25mg BID. Monitor BP closely. Titrate medications as needed.        Jaron Negrete NP  8/13/2021 5:28 AM

## 2021-08-13 NOTE — ROUTINE PROCESS
Bedside and Verbal shift change report given to self (oncoming nurse) by Art RN (offgoing nurse). Report included the following information SBAR, Kardex, ED Summary, Intake/Output, MAR and Recent Results.

## 2021-08-13 NOTE — ED TRIAGE NOTES
Patient states neck pain that radiates into his chest. States this woke him up about 1.5 hours ago. States has been being evaluated by cardiology. Also states headache.

## 2021-08-13 NOTE — PROGRESS NOTES
TRANSFER - OUT REPORT:  C by Dr. Marco Celeste  Right radial access  PCI to Ramus  Right wrist TR band with 12ml  Heparin 10,000 units total  Versed 4mg IV  Fentanyl 100mcg IV  Brilinta 180mg PO  Access site soft, clean, dry, and intact; with no signs of bleeding or hematoma  Pt. Tolerated procedure    Verbal report given to Jumana(name) on Zaire Carlos  being transferred to CPRU(unit) for routine progression of care       Report consisted of patients Situation, Background, Assessment and   Recommendations(SBAR). Information from the following report(s) Procedure Summary and MAR was reviewed with the receiving nurse. Lines:   Peripheral IV 08/13/21 Right Antecubital (Active)   Site Assessment Clean, dry, & intact 08/13/21 1211   Phlebitis Assessment 0 08/13/21 1211   Infiltration Assessment 0 08/13/21 1211   Dressing Status Clean, dry, & intact 08/13/21 1211   Dressing Type Tape;Transparent 08/13/21 1211   Hub Color/Line Status Infusing;Patent 08/13/21 1211        Opportunity for questions and clarification was provided.       Patient transported with:   Registered Nurse

## 2021-08-14 VITALS
BODY MASS INDEX: 35.21 KG/M2 | DIASTOLIC BLOOD PRESSURE: 71 MMHG | SYSTOLIC BLOOD PRESSURE: 114 MMHG | RESPIRATION RATE: 18 BRPM | HEIGHT: 72 IN | WEIGHT: 260 LBS | TEMPERATURE: 97.5 F | HEART RATE: 63 BPM | OXYGEN SATURATION: 96 %

## 2021-08-14 PROBLEM — I25.10 CAD (CORONARY ARTERY DISEASE): Status: ACTIVE | Noted: 2021-08-14

## 2021-08-14 LAB
ANION GAP SERPL CALC-SCNC: 7 MMOL/L (ref 7–16)
ATRIAL RATE: 77 BPM
BASOPHILS # BLD: 0.1 K/UL (ref 0–0.2)
BASOPHILS NFR BLD: 1 % (ref 0–2)
BUN SERPL-MCNC: 13 MG/DL (ref 6–23)
CALCIUM SERPL-MCNC: 8.7 MG/DL (ref 8.3–10.4)
CALCULATED P AXIS, ECG09: 26 DEGREES
CALCULATED R AXIS, ECG10: 65 DEGREES
CALCULATED T AXIS, ECG11: 77 DEGREES
CHLORIDE SERPL-SCNC: 107 MMOL/L (ref 98–107)
CO2 SERPL-SCNC: 25 MMOL/L (ref 21–32)
CREAT SERPL-MCNC: 1.03 MG/DL (ref 0.8–1.5)
DIAGNOSIS, 93000: NORMAL
DIFFERENTIAL METHOD BLD: NORMAL
EOSINOPHIL # BLD: 0.2 K/UL (ref 0–0.8)
EOSINOPHIL NFR BLD: 3 % (ref 0.5–7.8)
ERYTHROCYTE [DISTWIDTH] IN BLOOD BY AUTOMATED COUNT: 12.2 % (ref 11.9–14.6)
GLUCOSE SERPL-MCNC: 105 MG/DL (ref 65–100)
HCT VFR BLD AUTO: 45.3 % (ref 41.1–50.3)
HGB BLD-MCNC: 15.3 G/DL (ref 13.6–17.2)
IMM GRANULOCYTES # BLD AUTO: 0 K/UL (ref 0–0.5)
IMM GRANULOCYTES NFR BLD AUTO: 0 % (ref 0–5)
LYMPHOCYTES # BLD: 2.3 K/UL (ref 0.5–4.6)
LYMPHOCYTES NFR BLD: 26 % (ref 13–44)
MCH RBC QN AUTO: 30.4 PG (ref 26.1–32.9)
MCHC RBC AUTO-ENTMCNC: 33.8 G/DL (ref 31.4–35)
MCV RBC AUTO: 90.1 FL (ref 79.6–97.8)
MONOCYTES # BLD: 0.9 K/UL (ref 0.1–1.3)
MONOCYTES NFR BLD: 10 % (ref 4–12)
NEUTS SEG # BLD: 5.2 K/UL (ref 1.7–8.2)
NEUTS SEG NFR BLD: 60 % (ref 43–78)
NRBC # BLD: 0 K/UL (ref 0–0.2)
P-R INTERVAL, ECG05: 148 MS
PLATELET # BLD AUTO: 259 K/UL (ref 150–450)
PMV BLD AUTO: 9.4 FL (ref 9.4–12.3)
POTASSIUM SERPL-SCNC: 4.3 MMOL/L (ref 3.5–5.1)
Q-T INTERVAL, ECG07: 376 MS
QRS DURATION, ECG06: 82 MS
QTC CALCULATION (BEZET), ECG08: 425 MS
RBC # BLD AUTO: 5.03 M/UL (ref 4.23–5.6)
SODIUM SERPL-SCNC: 139 MMOL/L (ref 138–145)
VENTRICULAR RATE, ECG03: 77 BPM
WBC # BLD AUTO: 8.7 K/UL (ref 4.3–11.1)

## 2021-08-14 PROCEDURE — 36415 COLL VENOUS BLD VENIPUNCTURE: CPT

## 2021-08-14 PROCEDURE — 99218 HC RM OBSERVATION: CPT

## 2021-08-14 PROCEDURE — 99217 PR OBSERVATION CARE DISCHARGE MANAGEMENT: CPT | Performed by: INTERNAL MEDICINE

## 2021-08-14 PROCEDURE — 74011250637 HC RX REV CODE- 250/637: Performed by: INTERNAL MEDICINE

## 2021-08-14 PROCEDURE — 85025 COMPLETE CBC W/AUTO DIFF WBC: CPT

## 2021-08-14 PROCEDURE — 80048 BASIC METABOLIC PNL TOTAL CA: CPT

## 2021-08-14 PROCEDURE — 74011250637 HC RX REV CODE- 250/637: Performed by: NURSE PRACTITIONER

## 2021-08-14 RX ORDER — ATORVASTATIN CALCIUM 80 MG/1
80 TABLET, FILM COATED ORAL DAILY
Qty: 30 TABLET | Refills: 11 | Status: SHIPPED | OUTPATIENT
Start: 2021-08-15

## 2021-08-14 RX ORDER — METOPROLOL SUCCINATE 25 MG/1
25 TABLET, EXTENDED RELEASE ORAL DAILY
Qty: 30 TABLET | Refills: 11 | Status: SHIPPED | OUTPATIENT
Start: 2021-08-14

## 2021-08-14 RX ORDER — NITROGLYCERIN 0.4 MG/1
0.4 TABLET SUBLINGUAL
Qty: 1 BOTTLE | Refills: 5 | Status: SHIPPED | OUTPATIENT
Start: 2021-08-14

## 2021-08-14 RX ORDER — GUAIFENESIN 100 MG/5ML
81 LIQUID (ML) ORAL DAILY
Status: SHIPPED | COMMUNITY
Start: 2021-08-15

## 2021-08-14 RX ORDER — LISINOPRIL 2.5 MG/1
2.5 TABLET ORAL DAILY
Qty: 30 TABLET | Refills: 11 | Status: SHIPPED | OUTPATIENT
Start: 2021-08-14 | End: 2021-09-15

## 2021-08-14 RX ADMIN — Medication 10 ML: at 06:13

## 2021-08-14 RX ADMIN — Medication 10 ML: at 13:02

## 2021-08-14 RX ADMIN — ATORVASTATIN CALCIUM 80 MG: 80 TABLET, FILM COATED ORAL at 08:54

## 2021-08-14 RX ADMIN — ASPIRIN 81 MG: 81 TABLET, CHEWABLE ORAL at 08:54

## 2021-08-14 RX ADMIN — Medication 10 ML: at 06:12

## 2021-08-14 RX ADMIN — TICAGRELOR 90 MG: 90 TABLET ORAL at 06:13

## 2021-08-14 RX ADMIN — METOPROLOL TARTRATE 25 MG: 25 TABLET, FILM COATED ORAL at 08:55

## 2021-08-14 NOTE — PROGRESS NOTES
Problem: Falls - Risk of  Goal: *Absence of Falls  Description: Document [place] Fall Risk and appropriate interventions in the flowsheet.   Outcome: Resolved/Not Met  Note: Fall Risk Interventions:            Medication Interventions: Evaluate medications/consider consulting pharmacy, Patient to call before getting OOB, Teach patient to arise slowly                   Problem: Patient Education: Go to Patient Education Activity  Goal: Patient/Family Education  Outcome: Resolved/Not Met

## 2021-08-14 NOTE — ROUTINE PROCESS
Bedside and Verbal shift change report given to self (oncoming nurse) by SIMON Khan (offgoing nurse). Report included the following information SBAR, Kardex, Intake/Output, MAR and Recent Results.

## 2021-08-14 NOTE — DISCHARGE SUMMARY
[address] [facility] Cardiology Discharge Summary     Patient ID:  Bebe Stack  [id number]  29 y.o.  1960    Admit date: 8/13/2021    Discharge date:  8/14/2021    Admitting Physician: Lizzy Dye MD     Discharge Physician: Dr. Marta Carrizales    Admission Diagnoses: NSTEMI    Discharge Diagnoses:    Diagnosis    CAD (coronary artery disease)    Chest pain    HTN (hypertension)       Cardiology Procedures this admission:  Left heart catheterization with PCI  EchoCardiogram  Consults: None    Hospital Course: Patient presented to the ED with complaints of CP. He was found to be hypertensive with significant hs-troponin elevation. He was admitted and subsequently scheduled for a LHC at [facility] on 8/13/21. Patient underwent cardiac catheterization by Dr. Chika Núñez. Patient was found to have a 90% stenosis of the RI that was stented with a 2.5 x 38mm Juan Jose DOROTHEA with 0% residual stenosis. Patient was found to have a 90% stenosis of the lateral RI that was treated with balloon angioplasty with 50% residual stenosis. Patient tolerated the procedure well and was taken to the telemetry floor for recovery. ECHO showed:  LV: Estimated LVEF is 55 - 60%. Normal cavity size, wall thickness, systolic function (ejection fraction normal) and diastolic function. The following morning patient was up feeling well without any complaints of chest pain or shortness of breath. Patient's right radial cath site was clean, dry and intact without hematoma or bruit. Patient's labs were stable. Patient was seen and examined by Dr. Marta Carrizales and determined stable and ready for discharge. Patient was instructed on the importance of medication compliance including taking Aspirin and Brilinta everyday without missing a dose. After receiving drug eluting stents, the patient will remain on dual anti-platelet therapy for 1 year. For maximized medical therapy for CAD, patient will continue BB, ACE and statin as well.   The patient will follow up with [facility] and has been referred to cardiac rehab. DISPOSITION: The patient is being discharged home in stable condition on a low saturated fat, low cholesterol and low salt diet. The patient is instructed to advance activities as tolerated to the limit of fatigue or shortness of breath. The patient is instructed to avoid all heavy lifting, straining, stooping or squatting for 3-5 days. The patient is instructed to watch the cath site for bleeding/oozing; if seen, the patient is instructed to apply firm pressure with a clean cloth and call [facility] at [phone]. The patient is instructed to watch for signs of infection which include: increasing area of redness, fever/hot to touch or purulent drainage at the catheterization site. The patient is instructed not to soak in a bathtub for 7-10 days, but is cleared to shower. The patient is instructed to call the office or return to the ER for immediate evaluation for any shortness of breath or chest pain not relieved by NTG. Discharge Exam:   Visit Vitals  BP (!) 130/90   Pulse 64   Temp 97.4 °F (36.3 °C)   Resp 18   Ht 6' (1.829 m)   Wt 117.9 kg (260 lb)   SpO2 96%   BMI 35.26 kg/m²       Patient has been seen by Dr. Reno Patton: see his progress note for exam details.     Recent Results (from the past 24 hour(s))   EKG, 12 LEAD, INITIAL    Collection Time: 08/13/21  3:47 AM   Result Value Ref Range    Ventricular Rate 77 BPM    Atrial Rate 77 BPM    P-R Interval 152 ms    QRS Duration 88 ms    Q-T Interval 356 ms    QTC Calculation (Bezet) 402 ms    Calculated P Axis 37 degrees    Calculated R Axis 49 degrees    Calculated T Axis 53 degrees    Diagnosis       Normal sinus rhythm  Nonspecific ST abnormality  Abnormal ECG  No previous ECGs available  Confirmed by Helen Stewart ([id number]) on 8/13/2021 2:44:56 PM     TROPONIN-HIGH SENSITIVITY    Collection Time: 08/13/21  3:56 AM   Result Value Ref Range    Troponin-High Sensitivity 110.7 (HH) 0 - 14 pg/mL   CBC WITH AUTOMATED DIFF    Collection Time: 08/13/21  3:56 AM   Result Value Ref Range    WBC 6.9 4.3 - 11.1 K/uL    RBC 4.96 4.23 - 5.6 M/uL    HGB 15.5 13.6 - 17.2 g/dL    HCT 45.5 41.1 - 50.3 %    MCV 91.7 79.6 - 97.8 FL    MCH 31.3 26.1 - 32.9 PG    MCHC 34.1 31.4 - 35.0 g/dL    RDW 11.9 11.9 - 14.6 %    PLATELET 968 091 - 284 K/uL    MPV 9.6 9.4 - 12.3 FL    ABSOLUTE NRBC 0.00 0.0 - 0.2 K/uL    DF AUTOMATED      NEUTROPHILS 45 43 - 78 %    LYMPHOCYTES 39 13 - 44 %    MONOCYTES 11 4.0 - 12.0 %    EOSINOPHILS 4 0.5 - 7.8 %    BASOPHILS 1 0.0 - 2.0 %    IMMATURE GRANULOCYTES 0 0.0 - 5.0 %    ABS. NEUTROPHILS 3.1 1.7 - 8.2 K/UL    ABS. LYMPHOCYTES 2.7 0.5 - 4.6 K/UL    ABS. MONOCYTES 0.7 0.1 - 1.3 K/UL    ABS. EOSINOPHILS 0.3 0.0 - 0.8 K/UL    ABS. BASOPHILS 0.1 0.0 - 0.2 K/UL    ABS. IMM. GRANS. 0.0 0.0 - 0.5 K/UL   METABOLIC PANEL, COMPREHENSIVE    Collection Time: 08/13/21  3:56 AM   Result Value Ref Range    Sodium 140 138 - 145 mmol/L    Potassium 3.9 3.5 - 5.1 mmol/L    Chloride 106 98 - 107 mmol/L    CO2 26 21 - 32 mmol/L    Anion gap 8 7 - 16 mmol/L    Glucose 121 (H) 65 - 100 mg/dL    BUN 19 6 - 23 MG/DL    Creatinine 1.36 0.8 - 1.5 MG/DL    GFR est AA >60 >60 ml/min/1.73m2    GFR est non-AA 57 (L) >60 ml/min/1.73m2    Calcium 9.1 8.3 - 10.4 MG/DL    Bilirubin, total 0.4 0.2 - 1.1 MG/DL    ALT (SGPT) 27 12 - 65 U/L    AST (SGOT) 15 15 - 37 U/L    Alk.  phosphatase 74 50 - 136 U/L    Protein, total 7.5 6.3 - 8.2 g/dL    Albumin 3.9 3.5 - 5.0 g/dL    Globulin 3.6 (H) 2.3 - 3.5 g/dL    A-G Ratio 1.1 (L) 1.2 - 3.5     LIPASE    Collection Time: 08/13/21  3:56 AM   Result Value Ref Range    Lipase 109 73 - 393 U/L   MAGNESIUM    Collection Time: 08/13/21  3:56 AM   Result Value Ref Range    Magnesium 2.2 1.8 - 2.4 mg/dL   NT-PRO BNP    Collection Time: 08/13/21  3:56 AM   Result Value Ref Range    NT pro-BNP 26 5 - 125 PG/ML   TROPONIN-HIGH SENSITIVITY    Collection Time: 08/13/21  5:55 AM   Result Value Ref Range    Troponin-High Sensitivity 2,035.3 (HH) 0 - 14 pg/mL   ECHO ADULT COMPLETE    Collection Time: 08/13/21  8:00 AM   Result Value Ref Range    LV ED Vol A2C 119.00 cm3    LV ED Vol A4C 138.00 cm3    LV ES Vol A2C 61.30 cm3    LV ES Vol A4C 64.30 cm3    IVSd 1.10 (A) 0.60 - 1.00 cm    LVIDd 4.50 4. 20 - 5.90 cm    LVIDs 3.40 cm    LVOT d 2.20 cm    LVOT VTI 21.30 cm    LVOT Peak Gradient 3.00 mmHg    LVPWd 1.10 (A) 0.60 - 1.00 cm    LV E' Septal Velocity 8.61 cm/s    LV E' Lateral Velocity 8.12 cm/s    Left Atrium Minor Axis 2.30 cm    Left Atrium Major Axis 5.47 cm    LA Area 2C 18.40 cm2    LA Area 4C 17.10 cm2    Aortic Valve Systolic Mean Gradient 0.70 mmHg    AoV VTI 28.70 cm    Aortic Valve Systolic Peak Velocity 672.10 cm/s    AoV PG 7.00 mmHg    Aortic Valve Area by Continuity of VTI 2.82 cm2    Aortic Valve Area by Continuity of Peak Velocity 2.64 cm2    Ao Root D 3.40 cm    AO ASC D 3.40 cm    MV A Claude 87.30 cm/s    MV E Claude 72.30 cm/s    Tapse 2.97 (A) 1.50 - 2.00 cm    TR Max Velocity 272.00 cm/s    Triscuspid Valve Regurgitation Peak Gradient 30.00 mmHg    MV E/A 0.83     LV Mass .0 88.0 - 224.0 g    LV Mass AL Index 73.5 49.0 - 115.0 g/m2    E/E' lateral 8.90     E/E' septal 8.40     RVSP 33.0 mmHg    E/E' ratio (averaged) 8.65     Est. RA Pressure 3.0 mmHg    HILDA/BSA Pk Claude 1.1 cm2/m2    HILDA/BSA VTI 1.2 cm2/m2   HEPARIN XA UFH    Collection Time: 08/13/21 11:53 AM   Result Value Ref Range    Heparin Xa UFH <0.10 (L) 0.3 - 0.7 IU/mL   POC ACTIVATED CLOTTING TIME    Collection Time: 08/13/21  1:56 PM   Result Value Ref Range    Activated Clotting Time (POC) 252 (H) 70 - 128 SECS   EKG, 12 LEAD, INITIAL    Collection Time: 08/13/21  6:10 PM   Result Value Ref Range    Ventricular Rate 77 BPM    Atrial Rate 77 BPM    P-R Interval 148 ms    QRS Duration 82 ms    Q-T Interval 376 ms    QTC Calculation (Bezet) 425 ms    Calculated P Axis 26 degrees    Calculated R Axis 65 degrees Calculated T Axis 77 degrees    Diagnosis       Normal sinus rhythm  Normal ECG  When compared with ECG of 13-AUG-2021 03:47,  No significant change was found           Patient Instructions:     Current Discharge Medication List        START taking these medications    Details   atorvastatin (LIPITOR) 80 mg tablet Take 1 Tablet by mouth daily. Qty: 30 Tablet, Refills: 11  Start date: 8/15/2021      metoprolol succinate (TOPROL-XL) 25 mg XL tablet Take 1 Tablet by mouth daily. Qty: 30 Tablet, Refills: 11  Start date: 8/14/2021      aspirin 81 mg chewable tablet Take 1 Tablet by mouth daily. Start date: 8/15/2021      ticagrelor (BRILINTA) 90 mg tablet Take 1 Tablet by mouth every twelve (12) hours every twelve (12) hours. Qty: 60 Tablet, Refills: 11  Start date: 8/14/2021      nitroglycerin (NITROSTAT) 0.4 mg SL tablet 1 Tablet by SubLINGual route every five (5) minutes as needed for Chest Pain. Up to 3 doses. Qty: 1 Bottle, Refills: 5  Start date: 8/14/2021      lisinopriL (PRINIVIL, ZESTRIL) 2.5 mg tablet Take 1 Tablet by mouth daily. Qty: 30 Tablet, Refills: 11  Start date: 8/14/2021           I have personally seen and examined patient and agree with above assessment.  Please see my progress note for more details    Redd Meyer MD  8/14/2021

## 2021-08-14 NOTE — PROGRESS NOTES
Discharge instructions reviewed with pt. Prescriptions available for pickup at [address] [place] [address],[address] provided for all new medications. Opportunity for questions provided. Pt voiced understanding of all discharge instructions. IV and heart monitor removed.

## 2021-08-14 NOTE — PROGRESS NOTES
Problem: Falls - Risk of  Goal: *Absence of Falls  Description: Document [place] Fall Risk and appropriate interventions in the flowsheet.   8/14/2021 0902 by Yancy HANNA  Outcome: Resolved/Met  Note: Fall Risk Interventions:            Medication Interventions: Evaluate medications/consider consulting pharmacy, Patient to call before getting OOB, Teach patient to arise slowly                8/14/2021 0901 by Yancy HANNA  Outcome: Resolved/Not Met  Note: Fall Risk Interventions:            Medication Interventions: Evaluate medications/consider consulting pharmacy, Patient to call before getting OOB, Teach patient to arise slowly

## 2021-08-14 NOTE — DISCHARGE INSTRUCTIONS
The patient is being discharged home in stable condition on a low saturated fat, low cholesterol and low salt diet. The patient is instructed to advance activities as tolerated to the limit of fatigue or shortness of breath. The patient is instructed to avoid all heavy lifting, straining, stooping or squatting for 3-5 days. The patient is instructed to watch the cath site for bleeding/oozing; if seen, the patient is instructed to apply firm pressure with a clean cloth and call [facility] at [phone]. The patient is instructed to watch for signs of infection which include: increasing area of redness, fever/hot to touch or purulent drainage at the catheterization site. The patient is instructed not to soak in a bathtub for 7-10 days, but is cleared to shower. The patient is instructed to call the office or return to the ER for immediate evaluation for any shortness of breath or chest pain not relieved by NTG. Patient Education   No lifting of 10 lbs or more for 7 days, no tub baths for a week, may shower, no creams, lotions powders, or ointments to site for a week. Coronary Angioplasty: What to Expect at [facility]     Coronary angioplasty is a procedure that is used to open a narrowed or blocked coronary artery. It may also be called a percutaneous coronary intervention (PCI). The doctor opened your narrowed or blocked artery by putting a thin tube, called a catheter, into your heart through a blood vessel. The catheter was inserted into the blood vessel in your groin or arm. The doctor may have placed a small tube, called a stent, in the artery. Your groin or arm may have a bruise and feel sore for a day or two after the procedure. You can do light activities around the house. But don't do anything strenuous for a day or two. This care sheet gives you a general idea about how long it will take for you to recover. But each person recovers at a different pace.  Follow the steps below to get better as quickly as possible. How can you care for yourself at home? Activity    · If the doctor gave you a sedative:  ? For 24 hours, don't do anything that requires attention to detail, such as going to work, making important decisions, or signing any legal documents. It takes time for the medicine's effects to completely wear off.  ? For your safety, do not drive or operate any machinery that could be dangerous. Wait until the medicine wears off and you can think clearly and react easily.     · Do not do strenuous exercise and do not lift, pull, or push anything heavy until your doctor says it is okay. This may be for a day or two. You can walk around the house and do light activity, such as cooking.     · If the catheter was placed in your groin, try not to walk up stairs for the first couple of days.     · If the catheter was placed in your arm near your wrist, do not bend your wrist deeply for the first couple of days. Be careful using your hand to get into and out of a chair or bed.     · Carry your stent identification card with you at all times.     · If your doctor recommends it, get more exercise. Walking is a good choice. Bit by bit, increase the amount you walk every day. Try for at least 30 minutes on most days of the week.     · If you haven't been set up with a cardiac rehab program, talk to your doctor about whether rehab is right for you. Cardiac rehab includes supervised exercise. It also includes help with diet and lifestyle changes and emotional support. Diet    · Drink plenty of fluids to help your body flush out the dye. If you have kidney, heart, or liver disease and have to limit fluids, talk with your doctor before you increase the amount of fluids you drink.     · Keep eating a heart-healthy diet that has lots of fruits, vegetables, and whole grains. If you have not been eating this way, talk to your doctor. You also may want to talk to a dietitian.  This expert can help you to learn about healthy foods and plan meals. Medicines    · Your doctor will tell you if and when you can restart your medicines. Your doctor will also give you instructions about taking any new medicines.     · If you take aspirin or some other blood thinner, ask your doctor if and when to start taking it again. Make sure that you understand exactly what your doctor wants you to do.     · Your doctor will prescribe blood-thinning medicines. You will likely take aspirin plus another antiplatelet, such as clopidogrel (Plavix). It is very important that you take these medicines exactly as directed. These medicines help keep the coronary artery open and reduce your risk of a heart attack.     · Call your doctor if you think you are having a problem with your medicine. Care of the catheter site    · For 1 or 2 days, keep a bandage over the spot where the catheter was inserted. The bandage probably will fall off in this time.     · Put ice or a cold pack on the area for 10 to 20 minutes at a time to help with soreness or swelling. Put a thin cloth between the ice and your skin.     · You may shower 24 to 48 hours after the procedure, if your doctor okays it. Pat the incision dry.     · Do not soak the catheter site until it is healed. Don't take a bath for 1 week, or until your doctor tells you it is okay.     · Watch for bleeding from the site. A small amount of blood (up to the size of a quarter) on the bandage can be normal.     · If you are bleeding, lie down and press on the area for 15 minutes to try to make it stop. If the bleeding does not stop, call your doctor or seek immediate medical care. Follow-up care is a key part of your treatment and safety. Be sure to make and go to all appointments, and call your doctor if you are having problems. It's also a good idea to know your test results and keep a list of the medicines you take. When should you call for help?    Call 911 anytime you think you may need emergency care. For example, call if:    · You passed out (lost consciousness).     · You have severe trouble breathing.     · You have sudden chest pain and shortness of breath, or you cough up blood.     · You have symptoms of a heart attack, such as:  ? Chest pain or pressure. ? Sweating. ? Shortness of breath. ? Nausea or vomiting. ? Pain that spreads from the chest to the neck, jaw, or one or both shoulders or arms. ? Dizziness or lightheadedness. ? A fast or uneven pulse. After calling 911, chew 1 adult-strength aspirin. Wait for an ambulance. Do not try to drive yourself.     · You have been diagnosed with angina, and you have angina symptoms that do not go away with rest or are not getting better within 5 minutes after you take one dose of nitroglycerin. Call your doctor now or seek immediate medical care if:    · You are bleeding from the area where the catheter was put in your artery.     · You have a fast-growing, painful lump at the catheter site.     · You have signs of infection, such as:  ? Increased pain, swelling, warmth, or redness. ? Red streaks leading from the catheter site. ? Pus draining from the catheter site. ? A fever.     · Your leg, arm, or hand is painful, looks blue, or feels cold, numb, or tingly. Watch closely for changes in your health, and be sure to contact your doctor if you have any problems. Where can you learn more? Go to http://www.gray.com/  Enter M542 in the search box to learn more about \"Coronary Angioplasty: What to Expect at Home. \"  Current as of: August 31, 2020               Content Version: 12.8  © 7271-7154 [employer]. Care instructions adapted under license by [employer] (which disclaims liability or warranty for this information).  If you have questions about a medical condition or this instruction, always ask your healthcare professional. [address]  any warranty or liability for your use of this information. Patient Education        Reducing Risk of Another Heart Attack With Medicine: Care Instructions  Your Care Instructions     After a heart attack, medicines help lower your risk of having another one. These medicines include:  · ACE inhibitors or ARBs. These are types of blood pressure medicines. · Statins and other cholesterol medicines. These lower cholesterol. · Aspirin and other antiplatelets. These medicines prevent blood clots from forming in your blood vessels. This can help prevent a heart attack. · Beta-blocker medicines. These are a type of blood pressure and heart medicine. All medicines can cause side effects. So it is important to understand the pros and cons of any medicine you take. It is also important to take your medicines exactly as your doctor tells you to. Follow-up care is a key part of your treatment and safety. Be sure to make and go to all appointments, and call your doctor if you are having problems. It's also a good idea to know your test results and keep a list of the medicines you take. ACE inhibitors  ACE (angiotensin-converting enzyme) inhibitors are used for three main reasons. They lower blood pressure. They protect the kidneys. And they prevent heart attacks and strokes. Examples include:  · Benazepril (Lotensin). · Lisinopril (Prinivil). · Ramipril (Altace). An angiotensin II receptor blocker (ARB) may be used instead of an ACE inhibitor. ARBs help you in the same ways as ACE inhibitors. Examples include:  · Candesartan (Atacand). · Irbesartan (Avapro). · Losartan (Cozaar). Before you start taking an ACE inhibitor or an ARB, make sure your doctor knows if you:  · Take water pills (diuretics). · Take potassium pills or use salt substitutes. · Are pregnant or breastfeeding. · Had a kidney transplant or other kidney problems. ACE inhibitors and ARBs can cause side effects.  Call your doctor right away if you have:  · Trouble breathing. · Swelling in your face, head, neck, or tongue. Statins  Statins can help lower your risk for a heart attack and stroke. This medicine lowers your cholesterol. Examples include:  · Atorvastatin (Lipitor). · Lovastatin (Mevacor). · Pravastatin (Pravachol). · Simvastatin (Zocor). Before you start taking a statin, talk to your doctor. Make sure your doctor knows if:  · You have had a kidney transplant or other kidney problems. · You have liver disease. · You take any other prescription medicine, over-the-counter medicine, vitamins, supplements, or herbal remedies. · You are pregnant or breastfeeding. Statins can cause side effects. Call your doctor right away if you have:  · New, severe muscle aches. · Brown urine. Aspirin  After a heart attack, aspirin can help lower your risk of having another one. Most heart attacks are caused by a blood clot that blocks a coronary artery. When this happens, oxygen can't get to the heart muscle, and part of the heart dies. Aspirin can help prevent blood clots that can block the blood vessels. You may not be able to use aspirin if you:  · Have asthma or certain other health conditions. · Have an ulcer or other stomach problem. · Take some other medicine (called a blood thinner) that prevents blood clots. · Are allergic to aspirin. Your doctor may recommend that you take one low-dose aspirin (81 mg) tablet each day, with a meal and a full glass of water. Aspirin can also cause serious bleeding. Be sure you get instructions about how to take aspirin safely. Call your doctor right away if you have:  · Unusual bleeding. · Nausea, vomiting, or heartburn. · Black or bloody stools. Beta-blockers  Beta-blockers are used for three main reasons. They lower blood pressure. They relieve angina symptoms (such as chest pain or pressure). And they reduce the chances of a second heart attack. They include:  · Atenolol (Tenormin). · Carvedilol (Coreg).   · Metoprolol (Lopressor). Before you start taking a beta-blocker, make sure your doctor knows if you have:  · Severe asthma or frequent asthma attacks. · A very slow pulse. (This is less than 55 beats a minute.)  Beta-blockers can cause side effects. Call your doctor right away if you:  · Wheeze or have trouble breathing. · Feel dizzy or lightheaded. · Have asthma that gets worse. When should you call for help? Watch closely for changes in your health, and be sure to contact your doctor if you have any problems. Where can you learn more? Go to http://www.gray.com/  Enter R428 in the search box to learn more about \"Reducing Risk of Another Heart Attack With Medicine: Care Instructions. \"  Current as of: August 31, 2020               Content Version: 12.8  © 2006-2021 [employer]. Care instructions adapted under license by [employer] (which disclaims liability or warranty for this information). If you have questions about a medical condition or this instruction, always ask your healthcare professional. [place] [address] any warranty or liability for your use of this information. Patient Education   Aspirin (By mouth)   Aspirin (AS-pir-in)  Treats pain, fever, and inflammation. May lower risk of heart attack and stroke. Brand Name(s): Ascriptin Regular Strength, Aspergum, Aspir Low, Aspirin Adult Low Dose, Aspirin Low Dose, Becca Aspirin Children's, Becca Aspirin Regimen, Becca Extra Strength, Becca Genuine Aspirin, Becca Low Dose, Bufferin, Bufferin Low Dose, Durlaza, Ecotrin, Ecpirin   There may be other brand names for this medicine. When This Medicine Should Not Be Used: This medicine is not right for everyone. Do not use it if you had an allergic reaction to aspirin or other NSAIDs, or if you have a history of asthma with nasal polyps and rhinitis.   How to Use This Medicine:   Delayed Release Capsule, Long Acting Capsule, Gum, Tablet, Chewable Tablet, Fizzy Tablet, Coated Tablet, Long Acting Tablet, 24 Hour Capsule  · Your doctor will tell you how much medicine to use. Do not use more than directed. · It is best to take this medicine with food or milk. · Capsule, tablet, or coated tablet: Swallow whole. Do not crush, break, or chew it. · Chewable tablet: You may chew it completely or swallow it whole. · Gum: Chew completely to make sure you get as much medicine as possible. Drink a full glass (8 ounces) of water after chewing the gum. · Swallow the extended-release capsule whole. Do not crush, break, or chew it. Take the capsule with a full glass of water at the same time each day. · Follow the instructions on the medicine label if you are using this medicine without a prescription. · Missed dose: If you miss a dose of Durlaza, skip the missed dose and go back to your regular dosing schedule. Do not take extra medicine to make up for a missed dose. · Store the medicine in a closed container at room temperature, away from heat, moisture, and direct light. Drugs and Foods to Avoid:   Ask your doctor or pharmacist before using any other medicine, including over-the-counter medicines, vitamins, and herbal products. · Some foods and medicines can affect how aspirin works. Tell your doctor if you are using any of the following:  ¨ Dipyridamole, methotrexate, probenecid, sulfinpyrazone, ticlopidine  ¨ Blood thinner (including clopidogrel, prasugrel, ticagrelor, warfarin)  ¨ Blood pressure medicine  ¨ Medicine to treat seizures (including phenytoin, valproic acid)  ¨ NSAID pain or arthritis medicine (including celecoxib, diclofenac, ibuprofen, naproxen)  ¨ Steroid medicine (including dexamethasone, hydrocortisone, methylprednisolone, prednisolone, prednisone)  · Do not take Durlaza 2 hours before or 1 hour after you drink alcohol or take medicines that contain alcohol.   Warnings While Using This Medicine:   · Tell your doctor if you are pregnant or breastfeeding. Do not use this medicine during the later part of a pregnancy unless your doctor tells you to. · Tell your doctor if you have kidney disease, liver disease, high blood pressure, heart disease, or a history of stomach bleeding or ulcers. · This medicine may increase your risk for bleeding, including stomach ulcers. · Do not give aspirin to a child or teenager who has chickenpox or flu symptoms, unless the doctor says it is okay. Aspirin can cause a life-threatening reaction called Reye syndrome. · Tell any doctor or dentist who treats you that you are using this medicine. This medicine may affect certain medical test results. · Keep all medicine out of the reach of children. Never share your medicine with anyone. Possible Side Effects While Using This Medicine:   Call your doctor right away if you notice any of these side effects:  · Allergic reaction: Itching or hives, swelling in your face or hands, swelling or tingling in your mouth or throat, chest tightness, trouble breathing  · Bloody or black stools, bloody vomit or vomit that looks like coffee grounds  · Chest tightness, wheezing  · Ringing in the ears  · Severe stomach pain  · Unusual bleeding, bruising, or weakness  If you notice other side effects that you think are caused by this medicine, tell your doctor. Call your doctor for medical advice about side effects. You may report side effects to FDA at 9-035-FDA-3977  © 2017 [facility] Information is for End User's use only and may not be sold, redistributed or otherwise used for commercial purposes. The above information is an  only. It is not intended as medical advice for individual conditions or treatments. Talk to your doctor, nurse or pharmacist before following any medical regimen to see if it is safe and effective for you.

## 2021-08-14 NOTE — PROGRESS NOTES
Care Management Interventions  Mode of Transport at Discharge:  (jacki turner Spouse [phone] )  Discharge Durable Medical Equipment: No  Physical Therapy Consult: No  Occupational Therapy Consult: No  Discharge Location  Discharge Placement: Home  CM met with pt and his spouse to discuss DCP. Pt admitted with NSTEMI. S/P PCI to LAD/RI. CM gave pt Brilinta medication card as pt has commercial insurance. Pt requests referral to [facility] to establish PCP. CM sent referral.  Spouse to transport pt home.

## 2021-08-14 NOTE — PROGRESS NOTES
[facility] CARDIOLOGY PROGRESS NOTE           8/14/2021 9:03 AM    Admit Date: 8/13/2021      Subjective:     No recurrent CP. No complaints    ROS:  Cardiovascular:  As noted above    Objective:      Vitals:    08/14/21 0110 08/14/21 0400 08/14/21 0420 08/14/21 0800   BP: (!) 130/90  112/62 114/71   Pulse: 64 69 66 63   Resp: 18  18 18   Temp: 97.4 °F (36.3 °C)  97.3 °F (36.3 °C) 97.5 °F (36.4 °C)   SpO2: 96%  97% 96%   Weight:       Height:           Physical Exam:  General-No Acute Distress  Neck- supple, no JVD  CV- regular rate and rhythm no MRG  Lung- clear bilaterally  Abd- soft, nontender, nondistended  Ext- no edema bilaterally. Skin- warm and dry    Data Review:   Recent Labs     08/14/21  0639 08/13/21  0356    140   K 4.3 3.9   MG  --  2.2   BUN 13 19   CREA 1.03 1.36   * 121*   WBC 8.7 6.9   HGB 15.3 15.5   HCT 45.3 45.5    252       Assessment/Plan:     Principal Problem:    NSTEMI (8/13/2021)  -s/p PCI; continue DAPT/high intensity statin. On a beta-blocker. Stressed compliance with dual antiplatelet therapy. Echo with preserved EF and no noted wall motion abnormalities. Active Problems:    HTN (hypertension) (8/13/2021)  -Controlled. CAD (coronary artery disease) (8/14/2021)  -s/p PCI to LAD/RI    Disposition-plans for home today.       Lacey Easton MD  8/14/2021 9:03 AM

## 2021-08-20 PROBLEM — E78.2 MIXED HYPERLIPIDEMIA: Status: ACTIVE | Noted: 2021-08-20

## 2021-09-15 PROBLEM — I25.5 ISCHEMIC CARDIOMYOPATHY: Status: ACTIVE | Noted: 2021-09-15

## 2021-09-15 PROBLEM — R07.9 CHEST PAIN: Status: RESOLVED | Noted: 2021-08-13 | Resolved: 2021-09-15

## 2022-03-18 PROBLEM — I25.5 ISCHEMIC CARDIOMYOPATHY: Status: ACTIVE | Noted: 2021-09-15

## 2022-03-18 PROBLEM — E78.2 MIXED HYPERLIPIDEMIA: Status: ACTIVE | Noted: 2021-08-20

## 2022-03-18 PROBLEM — I25.10 CAD (CORONARY ARTERY DISEASE): Status: ACTIVE | Noted: 2021-08-14

## 2022-03-19 PROBLEM — I10 HTN (HYPERTENSION): Status: ACTIVE | Noted: 2021-08-13

## 2022-06-29 PROBLEM — I25.10 CAD (CORONARY ARTERY DISEASE): Status: ACTIVE | Noted: 2021-08-14

## 2022-07-21 ENCOUNTER — OFFICE VISIT (OUTPATIENT)
Dept: CARDIOLOGY CLINIC | Age: 62
End: 2022-07-21
Payer: COMMERCIAL

## 2022-07-21 VITALS
SYSTOLIC BLOOD PRESSURE: 128 MMHG | WEIGHT: 262 LBS | BODY MASS INDEX: 35.49 KG/M2 | DIASTOLIC BLOOD PRESSURE: 70 MMHG | HEIGHT: 72 IN | HEART RATE: 60 BPM

## 2022-07-21 DIAGNOSIS — I25.5 ISCHEMIC CARDIOMYOPATHY: ICD-10-CM

## 2022-07-21 DIAGNOSIS — I25.10 CORONARY ARTERY DISEASE INVOLVING NATIVE CORONARY ARTERY OF NATIVE HEART WITHOUT ANGINA PECTORIS: Primary | ICD-10-CM

## 2022-07-21 DIAGNOSIS — E78.2 MIXED HYPERLIPIDEMIA: ICD-10-CM

## 2022-07-21 DIAGNOSIS — I10 PRIMARY HYPERTENSION: ICD-10-CM

## 2022-07-21 PROCEDURE — 99214 OFFICE O/P EST MOD 30 MIN: CPT | Performed by: INTERNAL MEDICINE

## 2022-07-21 RX ORDER — ATORVASTATIN CALCIUM 80 MG/1
80 TABLET, FILM COATED ORAL DAILY
Qty: 90 TABLET | Refills: 3 | Status: SHIPPED | OUTPATIENT
Start: 2022-07-21

## 2022-07-21 RX ORDER — METOPROLOL SUCCINATE 25 MG/1
25 TABLET, EXTENDED RELEASE ORAL DAILY
Qty: 90 TABLET | Refills: 3 | Status: SHIPPED | OUTPATIENT
Start: 2022-07-21

## 2022-07-21 ASSESSMENT — ENCOUNTER SYMPTOMS: SHORTNESS OF BREATH: 0

## 2022-07-21 NOTE — PROGRESS NOTES
[address] [place], [place]  [address], [address]  PHONE: [phone]    Abner Reilly  1960      SUBJECTIVE:   Abner Relily is a 63 y.o. male seen for a follow up visit regarding the following:     Chief Complaint   Patient presents with    Coronary Artery Disease       HPI:    Abner Reilly presents for routine follow up for known Coronary Artery Disease. Multiple issues addressed as outlined below:     Coronary Artery Disease:  Patient denies any recent angina. Notes compliance with medical therapy. No recent NTG use. No intolerance to anti-platelet therapy. No blood in stool or melena. Ischemic Cardiomyopathy: No PND, orthopnea or edema   Echo (6/28/22): EF 67%. Normal diastolic function. Normal chamber sizes. AVSC. Hypertension:  Ambulatory BP readings have been controlled. Patient reports compliance with medical therapy without side effects. Hyperlipidemia: Review of records show lipid panel from 4/25/2022 with total cholesterol 120, HDL 46, LDL 60 and triglycerides 63. Continue Lipitor. Past Medical History, Past Surgical History, Family history, Social History, and Medications were all reviewed with the patient today and updated as necessary. Current Outpatient Medications:     atorvastatin (LIPITOR) 80 MG tablet, Take 1 tablet by mouth in the morning., Disp: 90 tablet, Rfl: 3    metoprolol succinate (TOPROL XL) 25 MG extended release tablet, Take 1 tablet by mouth in the morning., Disp: 90 tablet, Rfl: 3    aspirin 81 MG chewable tablet, Take 81 mg by mouth daily, Disp: , Rfl:     nitroGLYCERIN (NITROSTAT) 0.4 MG SL tablet, Place 0.4 mg under the tongue, Disp: , Rfl:      No Known Allergies     Patient Active Problem List    Diagnosis Date Noted    CAD (coronary artery disease) 08/14/2021     Priority: Low     1. NSTEMI (8/2/21):  PCI of Ramus 2.5 x 38 mm Resolute Boaz CHANELLE.     Moderate LAD and OM stenosis with normal DFR. EF 45-50%. 2.  Echo (8/13/21):  EF 55-60%. No significant valve disease. 3.  Echo (6/28/22): EF 67%. Normal diastolic function. Normal chamber sizes. AVSC. Ischemic cardiomyopathy 09/15/2021    Mixed hyperlipidemia 08/20/2021    HTN (hypertension) 08/13/2021        Social History     Tobacco Use    Smoking status: Never    Smokeless tobacco: Never   Substance Use Topics    Alcohol use: Yes       ROS:    Review of Systems   Constitutional: Negative for malaise/fatigue. Cardiovascular:  Negative for chest pain. Respiratory:  Negative for shortness of breath. Musculoskeletal:  Positive for arthritis. Neurological:  Negative for focal weakness. Psychiatric/Behavioral:  Negative for depression. PHYSICAL EXAM:  Wt Readings from Last 3 Encounters:   07/21/22 262 lb (118.8 kg)   06/27/22 256 lb (116.1 kg)   04/21/22 256 lb (116.1 kg)     BP Readings from Last 3 Encounters:   07/21/22 128/70   06/27/22 (!) 158/80   04/21/22 126/81     Pulse Readings from Last 3 Encounters:   07/21/22 60   06/27/22 76   04/21/22 62       Physical Exam  Constitutional:       General: He is not in acute distress. Neck:      Vascular: No carotid bruit. Cardiovascular:      Rate and Rhythm: Normal rate and regular rhythm. Pulmonary:      Effort: No respiratory distress. Breath sounds: Normal breath sounds. Abdominal:      General: Bowel sounds are normal.      Palpations: Abdomen is soft. Musculoskeletal:         General: No swelling. Skin:     General: Skin is warm and dry. Neurological:      General: No focal deficit present. Psychiatric:         Mood and Affect: Mood normal.       Medical problems and test results were reviewed with the patient today.        Lab Results   Component Value Date    WBC 6.0 04/25/2022    HGB 14.7 04/25/2022    HCT 43.7 04/25/2022    MCV 91 04/25/2022     04/25/2022       Lab Results   Component Value Date/Time     04/25/2022 10:24 AM    K 4.5 04/25/2022 10:24 AM     04/25/2022 10:24 AM    CO2 21 04/25/2022 10:24 AM    BUN 15 04/25/2022 10:24 AM    CREATININE 0.99 04/25/2022 10:24 AM    GLUCOSE 96 04/25/2022 10:24 AM    CALCIUM 8.9 04/25/2022 10:24 AM        Lab Results   Component Value Date    CHOL 120 04/25/2022     Lab Results   Component Value Date    TRIG 63 04/25/2022     Lab Results   Component Value Date    HDL 46 04/25/2022     Lab Results   Component Value Date    LDLCALC 60 04/25/2022     Lab Results   Component Value Date    VLDL 14 04/25/2022     No results found for: CHOLHDLRATIO     Data from outside records/labs from outside providers have been reviewed and summarized as noted in the HPI, past history and data review sections of this note       ASSESSMENT and PLAN      1. Coronary artery disease involving native coronary artery of native heart without angina pectoris  No angina. Doing well. Approaching 1 year from PCI. Stop Brilinta. Continue aspirin 81 mg indefinitely. 2. Primary hypertension  Blood pressure controlled. Continue metoprolol.  - metoprolol succinate (TOPROL XL) 25 MG extended release tablet; Take 1 tablet by mouth in the morning. Dispense: 90 tablet; Refill: 3    3. Mixed hyperlipidemia  Lipids at goal.  Continue Lipitor. 4. Ischemic cardiomyopathy  LV function normal.  On Toprol.  - atorvastatin (LIPITOR) 80 MG tablet; Take 1 tablet by mouth in the morning. Dispense: 90 tablet; Refill: 3                 Siobhan Herring MD  7/21/2022  8:29 AM    This note may have been dictated using speech recognition software.   As a result, error of speech recognition may have occurred

## 2022-10-21 ENCOUNTER — OFFICE VISIT (OUTPATIENT)
Dept: FAMILY MEDICINE CLINIC | Facility: CLINIC | Age: 62
End: 2022-10-21
Payer: COMMERCIAL

## 2022-10-21 VITALS
WEIGHT: 265.4 LBS | RESPIRATION RATE: 18 BRPM | HEART RATE: 82 BPM | BODY MASS INDEX: 35.95 KG/M2 | DIASTOLIC BLOOD PRESSURE: 80 MMHG | OXYGEN SATURATION: 96 % | HEIGHT: 72 IN | SYSTOLIC BLOOD PRESSURE: 134 MMHG | TEMPERATURE: 97.3 F

## 2022-10-21 DIAGNOSIS — E66.01 SEVERE OBESITY (BMI 35.0-39.9) WITH COMORBIDITY (HCC): ICD-10-CM

## 2022-10-21 DIAGNOSIS — B35.4 TINEA CORPORIS: ICD-10-CM

## 2022-10-21 DIAGNOSIS — Z23 NEED FOR PROPHYLACTIC VACCINATION AND INOCULATION AGAINST INFLUENZA: ICD-10-CM

## 2022-10-21 DIAGNOSIS — I25.10 CORONARY ARTERY DISEASE INVOLVING NATIVE CORONARY ARTERY OF NATIVE HEART WITHOUT ANGINA PECTORIS: ICD-10-CM

## 2022-10-21 DIAGNOSIS — E78.2 MIXED HYPERLIPIDEMIA: ICD-10-CM

## 2022-10-21 DIAGNOSIS — Z00.00 WELL ADULT EXAM: Primary | ICD-10-CM

## 2022-10-21 PROCEDURE — 90471 IMMUNIZATION ADMIN: CPT | Performed by: FAMILY MEDICINE

## 2022-10-21 PROCEDURE — 99396 PREV VISIT EST AGE 40-64: CPT | Performed by: FAMILY MEDICINE

## 2022-10-21 PROCEDURE — 90674 CCIIV4 VAC NO PRSV 0.5 ML IM: CPT | Performed by: FAMILY MEDICINE

## 2022-10-21 RX ORDER — CLOTRIMAZOLE 1 %
CREAM (GRAM) TOPICAL 2 TIMES DAILY
Qty: 28 G | Refills: 1 | Status: SHIPPED | OUTPATIENT
Start: 2022-10-21

## 2022-10-21 ASSESSMENT — ENCOUNTER SYMPTOMS: GASTROINTESTINAL NEGATIVE: 1

## 2022-10-21 ASSESSMENT — PATIENT HEALTH QUESTIONNAIRE - PHQ9
SUM OF ALL RESPONSES TO PHQ QUESTIONS 1-9: 0
2. FEELING DOWN, DEPRESSED OR HOPELESS: 0
SUM OF ALL RESPONSES TO PHQ9 QUESTIONS 1 & 2: 0
SUM OF ALL RESPONSES TO PHQ QUESTIONS 1-9: 0
1. LITTLE INTEREST OR PLEASURE IN DOING THINGS: 0

## 2022-10-21 NOTE — PROGRESS NOTES
10/21/2022    Flores Álvarez (:  1960) is a 63 y.o. male, here for a preventive medicine evaluation. New patient in 2022. Healthy until Aug, 2021; At that time he went to the ED for chest pain. Found to have coronary artery disease including 90% stenosis of 2 coronary arteries. 1 stent placed. Other artery opened with angioplasty. He has been doing well since surgery. He is compliant to aspirin, high-dose statin, beta-blocker. Stopped Brillinta after 12 months. He is a non-smoker. Minimal drinker. Has never shown any sign of diabetes. Trying to improve his diet. Limit red meat. Limit fast food. Eating more vegetables. Very active at work as his job as a . Trying to walk more. Rash:  Has a area of redness on his lower back. 3 inches in diameter, red confluent. Says that he sits on his tractor and sweats during the summer and first noticed it a couple months ago. COVID:  No infections. Vaccinated x 2. He has never had colonoscopy. Is agreeable at this time. Will try to schedule during the winter. Flu shot today. Will get shingles at local pharmacy. Patient Active Problem List   Diagnosis    CAD (coronary artery disease)    Mixed hyperlipidemia    Ischemic cardiomyopathy    HTN (hypertension)    Severe obesity (BMI 35.0-39. 9) with comorbidity ([address]    Well adult exam    Need for prophylactic vaccination and inoculation against influenza    Tinea corporis       Review of Systems   Constitutional: Negative. HENT: Negative. Cardiovascular: Negative. Gastrointestinal: Negative. Musculoskeletal: Negative. Skin: Negative. Neurological:  Negative for headaches. Hematological: Negative. Psychiatric/Behavioral: Negative. Prior to Visit Medications    Medication Sig Taking? Authorizing Provider   clotrimazole (LOTRIMIN) 1 % cream Apply topically 2 times daily Apply topically 2 times daily.  Yes Frances Peraza Eva Dougherty MD   atorvastatin (LIPITOR) 80 MG tablet Take 1 tablet by mouth in the morning. Yes Fco Paige MD   metoprolol succinate (TOPROL XL) 25 MG extended release tablet Take 1 tablet by mouth in the morning. Yes Fco Paige MD   aspirin 81 MG chewable tablet Take 81 mg by mouth daily Yes Ar Automatic Reconciliation   nitroGLYCERIN (NITROSTAT) 0.4 MG SL tablet Place 0.4 mg under the tongue Yes Ar Automatic Reconciliation        No Known Allergies    History reviewed. No pertinent past medical history.     Past Surgical History:   Procedure Laterality Date    CARDIAC CATHETERIZATION  08/15/2021    TONSILLECTOMY  1972       Social History     Socioeconomic History    Marital status:      Spouse name: Not on file    Number of children: Not on file    Years of education: Not on file    Highest education level: Not on file   Occupational History    Occupation:    Tobacco Use    Smoking status: Never    Smokeless tobacco: Never   Vaping Use    Vaping Use: Never used   Substance and Sexual Activity    Alcohol use: Yes    Drug use: Never    Sexual activity: Never     Birth control/protection: None   Other Topics Concern    Not on file   Social History Narrative    Not on file     Social Determinants of Health     Financial Resource Strain: Not on file   Food Insecurity: Not on file   Transportation Needs: Not on file   Physical Activity: Not on file   Stress: Not on file   Social Connections: Not on file   Intimate Partner Violence: Not on file   Housing Stability: Not on file        Family History   Problem Relation Age of Onset    Cancer Paternal Grandfather     No Known Problems Mother     Heart Disease Father         CABG at age 52    No Known Problems Paternal Grandmother     Heart Disease Maternal Grandfather     Heart Disease Maternal Grandmother        ADVANCE DIRECTIVE: N, <no information>    Vitals:    10/21/22 0918   BP: 134/80   Pulse: 82   Resp: 18   Temp: 97.3 °F (36.3 °C)   TempSrc: Temporal   SpO2: 96%  Comment: room air sitting   Weight: 265 lb 6.4 oz (120.4 kg)   Height: 6' (1.829 m)     Estimated body mass index is 35.99 kg/m² as calculated from the following:    Height as of this encounter: 6' (1.829 m). Weight as of this encounter: 265 lb 6.4 oz (120.4 kg). Physical Exam  Constitutional:       General: He is not in acute distress. Appearance: He is obese. He is not ill-appearing or toxic-appearing. HENT:      Right Ear: Tympanic membrane and ear canal normal.      Left Ear: Tympanic membrane and ear canal normal.   Cardiovascular:      Rate and Rhythm: Normal rate and regular rhythm. Pulmonary:      Effort: Pulmonary effort is normal.      Breath sounds: Normal breath sounds. Skin:     Capillary Refill: Capillary refill takes less than 2 seconds. Neurological:      General: No focal deficit present. Mental Status: He is oriented to person, place, and time. Psychiatric:         Mood and Affect: Mood normal.         Behavior: Behavior normal.         Thought Content: Thought content normal.         Judgment: Judgment normal.       No flowsheet data found. Lab Results   Component Value Date/Time    CHOL 120 04/25/2022 10:24 AM    TRIG 63 04/25/2022 10:24 AM    HDL 46 04/25/2022 10:24 AM    LDLCALC 60 04/25/2022 10:24 AM    GLUCOSE 96 04/25/2022 10:24 AM    LABA1C 5.6 04/25/2022 10:24 AM       The ASCVD Risk score (Kary CERVANTES, et al., 2019) failed to calculate for the following reasons:     The valid total cholesterol range is 130 to 320 mg/dL    Immunization History   Administered Date(s) Administered    Influenza, FLUCELVAX, (age 10 mo+), MDCK, PF, 0.5mL 10/21/2022       Health Maintenance   Topic Date Due    HIV screen  Never done    Hepatitis C screen  Never done    DTaP/Tdap/Td vaccine (1 - Tdap) Never done    Colorectal Cancer Screen  Never done    Shingles vaccine (1 of 2) Never done    COVID-19 Vaccine (3 - Booster for Steele Peter series) 11/08/2021    Depression Screen  04/21/2023    Lipids  04/25/2023    Flu vaccine  Completed    Hepatitis A vaccine  Aged Out    Hib vaccine  Aged Out    Meningococcal (ACWY) vaccine  Aged Out    Pneumococcal 0-64 years Vaccine  Aged Out       Assessment & Plan   Well adult exam  -     Lipid Panel; Future  -     Comprehensive Metabolic Panel; Future  -     CBC with Auto Differential; Future  Coronary artery disease involving native coronary artery of native heart without angina pectoris  Severe obesity (BMI 35.0-39. 9) with comorbidity ([address]  Mixed hyperlipidemia  -     Lipid Panel; Future  Need for prophylactic vaccination and inoculation against influenza  -     Influenza, FLUCELVAX, (age 10 mo+), IM, Preservative Free, 0.5 mL  Tinea corporis  -     clotrimazole (LOTRIMIN) 1 % cream; Apply topically 2 times daily Apply topically 2 times daily. , Topical, 2 TIMES DAILY Starting Fri 10/21/2022, Disp-28 g, R-1, Normal  Return in about 1 year (around 10/21/2023) for CPX, Labs one week before. Long discussion with him about preventative medicine. Needs to continue to try to lose weight. Got the flu shot today. He agrees to get shingles shot at local pharmacy. He will work on getting his colonoscopy done over the next couple months. Use clotrimazole cream for tinea infection. Otherwise, follow-up in 1 year. Sooner if needed.     --Marielos Simpson MD, MD

## 2022-11-20 PROBLEM — Z00.00 WELL ADULT EXAM: Status: RESOLVED | Noted: 2022-10-21 | Resolved: 2022-11-20

## 2022-12-27 ENCOUNTER — TELEPHONE (OUTPATIENT)
Dept: FAMILY MEDICINE CLINIC | Facility: CLINIC | Age: 62
End: 2022-12-27

## 2022-12-27 ENCOUNTER — APPOINTMENT (OUTPATIENT)
Dept: GENERAL RADIOLOGY | Age: 62
End: 2022-12-27
Payer: COMMERCIAL

## 2022-12-27 PROCEDURE — 87635 SARS-COV-2 COVID-19 AMP PRB: CPT

## 2022-12-27 PROCEDURE — 85027 COMPLETE CBC AUTOMATED: CPT

## 2022-12-27 PROCEDURE — 84484 ASSAY OF TROPONIN QUANT: CPT

## 2022-12-27 PROCEDURE — 80053 COMPREHEN METABOLIC PANEL: CPT

## 2022-12-27 PROCEDURE — 99285 EMERGENCY DEPT VISIT HI MDM: CPT

## 2022-12-27 PROCEDURE — 87804 INFLUENZA ASSAY W/OPTIC: CPT

## 2022-12-27 PROCEDURE — 83036 HEMOGLOBIN GLYCOSYLATED A1C: CPT

## 2022-12-27 PROCEDURE — 93005 ELECTROCARDIOGRAM TRACING: CPT | Performed by: PHYSICIAN ASSISTANT

## 2022-12-27 RX ORDER — 0.9 % SODIUM CHLORIDE 0.9 %
1000 INTRAVENOUS SOLUTION INTRAVENOUS
Status: COMPLETED | OUTPATIENT
Start: 2022-12-27 | End: 2022-12-28

## 2022-12-27 RX ORDER — ONDANSETRON 2 MG/ML
4 INJECTION INTRAMUSCULAR; INTRAVENOUS
Status: COMPLETED | OUTPATIENT
Start: 2022-12-27 | End: 2022-12-28

## 2022-12-27 ASSESSMENT — PAIN - FUNCTIONAL ASSESSMENT: PAIN_FUNCTIONAL_ASSESSMENT: NONE - DENIES PAIN

## 2022-12-27 NOTE — TELEPHONE ENCOUNTER
Informed patients wife of PCP response. Stated that she does not want to go to ER. Informed patient's wife that PCP is out of office but responding to messages. Also, informed patient's wife of new  in [facility] if she was not going to go to ER. Patient's wife stated that she will reach out to Cardiologist office to see if they have any appts or what they recommend. Requested Patient's wife to update PCP on whatever they choose to do.

## 2022-12-27 NOTE — TELEPHONE ENCOUNTER
Pt wife Jane Gutierrez called stating that pt has been having some dizziness and weakness. BP was fine. I had suggested that she may need to take him to the ER when she said that he had these same symptoms when he had a Heart attach.  She wanted me to send a note to Dr. Judi Jordan and his Nurse about this

## 2022-12-28 ENCOUNTER — APPOINTMENT (OUTPATIENT)
Dept: CT IMAGING | Age: 62
End: 2022-12-28
Payer: COMMERCIAL

## 2022-12-28 ENCOUNTER — HOSPITAL ENCOUNTER (INPATIENT)
Age: 62
LOS: 2 days | Discharge: HOME | End: 2022-12-30
Attending: EMERGENCY MEDICINE | Admitting: INTERNAL MEDICINE
Payer: COMMERCIAL

## 2022-12-28 ENCOUNTER — HOSPITAL ENCOUNTER (EMERGENCY)
Dept: GENERAL RADIOLOGY | Age: 62
Discharge: HOME | End: 2022-12-31
Payer: COMMERCIAL

## 2022-12-28 ENCOUNTER — APPOINTMENT (OUTPATIENT)
Dept: MRI IMAGING | Age: 62
End: 2022-12-28
Payer: COMMERCIAL

## 2022-12-28 DIAGNOSIS — I63.539 CEREBROVASCULAR ACCIDENT (CVA) DUE TO OCCLUSION OF POSTERIOR CEREBRAL ARTERY, UNSPECIFIED BLOOD VESSEL LATERALITY (HCC): ICD-10-CM

## 2022-12-28 DIAGNOSIS — R42 DIZZINESS: Primary | ICD-10-CM

## 2022-12-28 PROBLEM — I10 HTN (HYPERTENSION): Status: ACTIVE | Noted: 2021-08-13

## 2022-12-28 LAB
ALBUMIN SERPL-MCNC: 3.9 G/DL (ref 3.2–4.6)
ALBUMIN/GLOB SERPL: 1 {RATIO} (ref 0.4–1.6)
ALP SERPL-CCNC: 86 U/L (ref 50–136)
ALT SERPL-CCNC: 27 U/L (ref 12–65)
ANION GAP SERPL CALC-SCNC: 8 MMOL/L (ref 2–11)
APPEARANCE UR: ABNORMAL
AST SERPL-CCNC: 11 U/L (ref 15–37)
BACTERIA URNS QL MICRO: NEGATIVE /HPF
BILIRUB SERPL-MCNC: 0.6 MG/DL (ref 0.2–1.1)
BILIRUB UR QL: NEGATIVE
BILIRUB UR QL: NEGATIVE
BUN SERPL-MCNC: 14 MG/DL (ref 8–23)
CALCIUM SERPL-MCNC: 9.4 MG/DL (ref 8.3–10.4)
CASTS URNS QL MICRO: ABNORMAL /LPF
CHLORIDE SERPL-SCNC: 105 MMOL/L (ref 101–110)
CHOLEST SERPL-MCNC: 131 MG/DL
CO2 SERPL-SCNC: 27 MMOL/L (ref 21–32)
COLOR UR: ABNORMAL
CREAT SERPL-MCNC: 1.2 MG/DL (ref 0.8–1.5)
EKG ATRIAL RATE: 76 BPM
EKG DIAGNOSIS: NORMAL
EKG P AXIS: 24 DEGREES
EKG P-R INTERVAL: 156 MS
EKG Q-T INTERVAL: 378 MS
EKG QRS DURATION: 88 MS
EKG QTC CALCULATION (BAZETT): 425 MS
EKG R AXIS: 50 DEGREES
EKG T AXIS: 22 DEGREES
EKG VENTRICULAR RATE: 76 BPM
EPI CELLS #/AREA URNS HPF: ABNORMAL /HPF
ERYTHROCYTE [DISTWIDTH] IN BLOOD BY AUTOMATED COUNT: 12 % (ref 11.9–14.6)
EST. AVERAGE GLUCOSE BLD GHB EST-MCNC: 123 MG/DL
FLUAV AG NPH QL IA: NEGATIVE
FLUBV AG NPH QL IA: NEGATIVE
GLOBULIN SER CALC-MCNC: 4 G/DL (ref 2.8–4.5)
GLUCOSE SERPL-MCNC: 176 MG/DL (ref 65–100)
GLUCOSE UR QL STRIP.AUTO: NEGATIVE MG/DL
GLUCOSE UR STRIP.AUTO-MCNC: NEGATIVE MG/DL
HBA1C MFR BLD: 5.9 % (ref 4.8–5.6)
HCT VFR BLD AUTO: 48.3 % (ref 41.1–50.3)
HDLC SERPL-MCNC: 42 MG/DL (ref 40–60)
HDLC SERPL: 3.1 {RATIO}
HGB BLD-MCNC: 16.5 G/DL (ref 13.6–17.2)
HGB UR QL STRIP: NEGATIVE
KETONES UR QL STRIP.AUTO: ABNORMAL MG/DL
KETONES UR-MCNC: NEGATIVE MG/DL
LDLC SERPL CALC-MCNC: 68.2 MG/DL
LEUKOCYTE ESTERASE UR QL STRIP.AUTO: NEGATIVE
LEUKOCYTE ESTERASE UR QL STRIP: NEGATIVE
MCH RBC QN AUTO: 30.5 PG (ref 26.1–32.9)
MCHC RBC AUTO-ENTMCNC: 34.2 G/DL (ref 31.4–35)
MCV RBC AUTO: 89.3 FL (ref 82–102)
NITRITE UR QL STRIP.AUTO: NEGATIVE
NITRITE UR QL: NEGATIVE
NRBC # BLD: 0 K/UL (ref 0–0.2)
PH UR STRIP: 5.5 [PH] (ref 5–9)
PH UR: 5.5 [PH] (ref 5–9)
PLATELET # BLD AUTO: 274 K/UL (ref 150–450)
PMV BLD AUTO: 9.6 FL (ref 9.4–12.3)
POTASSIUM SERPL-SCNC: 3.7 MMOL/L (ref 3.5–5.1)
PROT SERPL-MCNC: 7.9 G/DL (ref 6.3–8.2)
PROT UR QL: NEGATIVE MG/DL
PROT UR STRIP-MCNC: ABNORMAL MG/DL
RBC # BLD AUTO: 5.41 M/UL (ref 4.23–5.6)
RBC # UR STRIP: NEGATIVE /UL
RBC #/AREA URNS HPF: ABNORMAL /HPF
SARS-COV-2 RDRP RESP QL NAA+PROBE: NOT DETECTED
SERVICE CMNT-IMP: ABNORMAL
SODIUM SERPL-SCNC: 140 MMOL/L (ref 133–143)
SOURCE: NORMAL
SP GR UR REFRACTOMETRY: 1.03 (ref 1–1.02)
SP GR UR: >1.03 (ref 1–1.02)
SPECIMEN SOURCE: NORMAL
TRIGL SERPL-MCNC: 104 MG/DL (ref 35–150)
TROPONIN I SERPL HS-MCNC: 3.4 PG/ML (ref 0–14)
TROPONIN I SERPL HS-MCNC: 3.8 PG/ML (ref 0–14)
UROBILINOGEN UR QL STRIP.AUTO: 1 EU/DL (ref 0.2–1)
UROBILINOGEN UR QL: 1 EU/DL (ref 0.2–1)
VLDLC SERPL CALC-MCNC: 20.8 MG/DL (ref 6–23)
WBC # BLD AUTO: 10.4 K/UL (ref 4.3–11.1)
WBC URNS QL MICRO: ABNORMAL /HPF

## 2022-12-28 PROCEDURE — 70496 CT ANGIOGRAPHY HEAD: CPT

## 2022-12-28 PROCEDURE — 6360000002 HC RX W HCPCS: Performed by: PHYSICIAN ASSISTANT

## 2022-12-28 PROCEDURE — 80061 LIPID PANEL: CPT

## 2022-12-28 PROCEDURE — 6370000000 HC RX 637 (ALT 250 FOR IP): Performed by: INTERNAL MEDICINE

## 2022-12-28 PROCEDURE — 2580000003 HC RX 258: Performed by: PHYSICIAN ASSISTANT

## 2022-12-28 PROCEDURE — 81001 URINALYSIS AUTO W/SCOPE: CPT

## 2022-12-28 PROCEDURE — 70450 CT HEAD/BRAIN W/O DYE: CPT

## 2022-12-28 PROCEDURE — 6370000000 HC RX 637 (ALT 250 FOR IP): Performed by: PHYSICIAN ASSISTANT

## 2022-12-28 PROCEDURE — 6360000002 HC RX W HCPCS: Performed by: EMERGENCY MEDICINE

## 2022-12-28 PROCEDURE — 1100000003 HC PRIVATE W/ TELEMETRY

## 2022-12-28 PROCEDURE — 6360000002 HC RX W HCPCS: Performed by: INTERNAL MEDICINE

## 2022-12-28 PROCEDURE — 96376 TX/PRO/DX INJ SAME DRUG ADON: CPT

## 2022-12-28 PROCEDURE — 81003 URINALYSIS AUTO W/O SCOPE: CPT

## 2022-12-28 PROCEDURE — 70551 MRI BRAIN STEM W/O DYE: CPT

## 2022-12-28 PROCEDURE — 71045 X-RAY EXAM CHEST 1 VIEW: CPT

## 2022-12-28 PROCEDURE — 96374 THER/PROPH/DIAG INJ IV PUSH: CPT

## 2022-12-28 PROCEDURE — 6360000004 HC RX CONTRAST MEDICATION: Performed by: PHYSICIAN ASSISTANT

## 2022-12-28 RX ORDER — ACETAMINOPHEN 650 MG/1
650 SUPPOSITORY RECTAL EVERY 4 HOURS PRN
Status: DISCONTINUED | OUTPATIENT
Start: 2022-12-28 | End: 2022-12-30 | Stop reason: HOSPADM

## 2022-12-28 RX ORDER — BISACODYL 10 MG
10 SUPPOSITORY, RECTAL RECTAL DAILY PRN
Status: DISCONTINUED | OUTPATIENT
Start: 2022-12-28 | End: 2022-12-30 | Stop reason: HOSPADM

## 2022-12-28 RX ORDER — MAGNESIUM HYDROXIDE/ALUMINUM HYDROXICE/SIMETHICONE 120; 1200; 1200 MG/30ML; MG/30ML; MG/30ML
30 SUSPENSION ORAL
Status: COMPLETED | OUTPATIENT
Start: 2022-12-28 | End: 2022-12-28

## 2022-12-28 RX ORDER — NICOTINE 21 MG/24HR
1 PATCH, TRANSDERMAL 24 HOURS TRANSDERMAL DAILY PRN
Status: DISCONTINUED | OUTPATIENT
Start: 2022-12-28 | End: 2022-12-30 | Stop reason: HOSPADM

## 2022-12-28 RX ORDER — CALCIUM CARBONATE 200(500)MG
500 TABLET,CHEWABLE ORAL 3 TIMES DAILY PRN
Status: DISCONTINUED | OUTPATIENT
Start: 2022-12-28 | End: 2022-12-30 | Stop reason: HOSPADM

## 2022-12-28 RX ORDER — ONDANSETRON 2 MG/ML
4 INJECTION INTRAMUSCULAR; INTRAVENOUS ONCE
Status: COMPLETED | OUTPATIENT
Start: 2022-12-28 | End: 2022-12-28

## 2022-12-28 RX ORDER — ACETAMINOPHEN 325 MG/1
650 TABLET ORAL EVERY 4 HOURS PRN
Status: DISCONTINUED | OUTPATIENT
Start: 2022-12-28 | End: 2022-12-30 | Stop reason: HOSPADM

## 2022-12-28 RX ORDER — ATORVASTATIN CALCIUM 40 MG/1
80 TABLET, FILM COATED ORAL NIGHTLY
Status: DISCONTINUED | OUTPATIENT
Start: 2022-12-28 | End: 2022-12-30 | Stop reason: HOSPADM

## 2022-12-28 RX ORDER — ASPIRIN 300 MG/1
300 SUPPOSITORY RECTAL DAILY
Status: DISCONTINUED | OUTPATIENT
Start: 2022-12-28 | End: 2022-12-29

## 2022-12-28 RX ORDER — ONDANSETRON 2 MG/ML
4 INJECTION INTRAMUSCULAR; INTRAVENOUS EVERY 6 HOURS PRN
Status: DISCONTINUED | OUTPATIENT
Start: 2022-12-28 | End: 2022-12-30 | Stop reason: HOSPADM

## 2022-12-28 RX ORDER — FAMOTIDINE 20 MG/1
20 TABLET, FILM COATED ORAL 2 TIMES DAILY PRN
Status: DISCONTINUED | OUTPATIENT
Start: 2022-12-28 | End: 2022-12-30 | Stop reason: HOSPADM

## 2022-12-28 RX ORDER — SODIUM CHLORIDE 0.9 % (FLUSH) 0.9 %
10 SYRINGE (ML) INJECTION
Status: COMPLETED | OUTPATIENT
Start: 2022-12-28 | End: 2022-12-28

## 2022-12-28 RX ORDER — ONDANSETRON 4 MG/1
4 TABLET, ORALLY DISINTEGRATING ORAL EVERY 8 HOURS PRN
Status: DISCONTINUED | OUTPATIENT
Start: 2022-12-28 | End: 2022-12-30 | Stop reason: HOSPADM

## 2022-12-28 RX ORDER — 0.9 % SODIUM CHLORIDE 0.9 %
1000 INTRAVENOUS SOLUTION INTRAVENOUS
Status: COMPLETED | OUTPATIENT
Start: 2022-12-28 | End: 2022-12-28

## 2022-12-28 RX ORDER — MECLIZINE HYDROCHLORIDE 25 MG/1
25 TABLET ORAL
Status: COMPLETED | OUTPATIENT
Start: 2022-12-28 | End: 2022-12-28

## 2022-12-28 RX ORDER — PANTOPRAZOLE SODIUM 40 MG/1
40 TABLET, DELAYED RELEASE ORAL
Status: DISCONTINUED | OUTPATIENT
Start: 2022-12-29 | End: 2022-12-30 | Stop reason: HOSPADM

## 2022-12-28 RX ORDER — LIDOCAINE HYDROCHLORIDE 20 MG/ML
15 SOLUTION OROPHARYNGEAL
Status: COMPLETED | OUTPATIENT
Start: 2022-12-28 | End: 2022-12-28

## 2022-12-28 RX ORDER — ASPIRIN 81 MG/1
81 TABLET ORAL DAILY
Status: DISCONTINUED | OUTPATIENT
Start: 2022-12-28 | End: 2022-12-30 | Stop reason: HOSPADM

## 2022-12-28 RX ORDER — 0.9 % SODIUM CHLORIDE 0.9 %
100 INTRAVENOUS SOLUTION INTRAVENOUS
Status: COMPLETED | OUTPATIENT
Start: 2022-12-28 | End: 2022-12-28

## 2022-12-28 RX ORDER — POLYETHYLENE GLYCOL 3350 17 G/17G
17 POWDER, FOR SOLUTION ORAL DAILY PRN
Status: DISCONTINUED | OUTPATIENT
Start: 2022-12-28 | End: 2022-12-30 | Stop reason: HOSPADM

## 2022-12-28 RX ORDER — ENOXAPARIN SODIUM 100 MG/ML
30 INJECTION SUBCUTANEOUS 2 TIMES DAILY
Status: DISCONTINUED | OUTPATIENT
Start: 2022-12-28 | End: 2022-12-30 | Stop reason: HOSPADM

## 2022-12-28 RX ORDER — LABETALOL HYDROCHLORIDE 5 MG/ML
10 INJECTION, SOLUTION INTRAVENOUS EVERY 10 MIN PRN
Status: DISCONTINUED | OUTPATIENT
Start: 2022-12-28 | End: 2022-12-30 | Stop reason: HOSPADM

## 2022-12-28 RX ADMIN — ENOXAPARIN SODIUM 30 MG: 100 INJECTION SUBCUTANEOUS at 20:31

## 2022-12-28 RX ADMIN — FAMOTIDINE 20 MG: 20 TABLET, FILM COATED ORAL at 16:35

## 2022-12-28 RX ADMIN — LIDOCAINE HYDROCHLORIDE 15 ML: 20 SOLUTION ORAL at 03:06

## 2022-12-28 RX ADMIN — ALUMINUM HYDROXIDE, MAGNESIUM HYDROXIDE, AND SIMETHICONE 30 ML: 200; 200; 20 SUSPENSION ORAL at 03:06

## 2022-12-28 RX ADMIN — SODIUM CHLORIDE, PRESERVATIVE FREE 10 ML: 5 INJECTION INTRAVENOUS at 08:00

## 2022-12-28 RX ADMIN — SODIUM CHLORIDE 1000 ML: 9 INJECTION, SOLUTION INTRAVENOUS at 04:39

## 2022-12-28 RX ADMIN — MECLIZINE HYDROCHLORIDE 25 MG: 25 TABLET ORAL at 02:57

## 2022-12-28 RX ADMIN — ONDANSETRON 4 MG: 2 INJECTION INTRAMUSCULAR; INTRAVENOUS at 09:13

## 2022-12-28 RX ADMIN — ONDANSETRON 4 MG: 2 INJECTION INTRAMUSCULAR; INTRAVENOUS at 02:57

## 2022-12-28 RX ADMIN — IOPAMIDOL 50 ML: 755 INJECTION, SOLUTION INTRAVENOUS at 08:00

## 2022-12-28 RX ADMIN — SODIUM CHLORIDE 100 ML: 9 INJECTION, SOLUTION INTRAVENOUS at 08:00

## 2022-12-28 RX ADMIN — ACETAMINOPHEN 650 MG: 325 TABLET ORAL at 13:54

## 2022-12-28 RX ADMIN — ATORVASTATIN CALCIUM 80 MG: 40 TABLET, FILM COATED ORAL at 20:31

## 2022-12-28 RX ADMIN — SODIUM CHLORIDE 1000 ML: 9 INJECTION, SOLUTION INTRAVENOUS at 02:57

## 2022-12-28 RX ADMIN — ASPIRIN 81 MG: 81 TABLET ORAL at 13:54

## 2022-12-28 ASSESSMENT — ENCOUNTER SYMPTOMS
RHINORRHEA: 0
SORE THROAT: 0
ABDOMINAL PAIN: 0
EYE PAIN: 0
COUGH: 0
NAUSEA: 1
SHORTNESS OF BREATH: 0
PHOTOPHOBIA: 0
VOMITING: 1

## 2022-12-28 ASSESSMENT — PAIN DESCRIPTION - LOCATION: LOCATION: HEAD

## 2022-12-28 ASSESSMENT — PAIN SCALES - GENERAL: PAINLEVEL_OUTOF10: 3

## 2022-12-28 NOTE — PROGRESS NOTES
TRANSFER - IN REPORT:    Verbal report received from Bno Ojeda on Jules Carry  being received from ED for routine progression of patient care      Report consisted of patient's Situation, Background, Assessment and   Recommendations(SBAR). Information from the following report(s) Nurse Handoff Report, ED Encounter Summary, ED SBAR, Recent Results, Med Rec Status, and Neuro Assessment was reviewed with the receiving nurse. Opportunity for questions and clarification was provided. Assessment completed upon patient's arrival to unit and care assumed. Patient arrived from ED via stretcher. NIH Performed with ED nurse, DAISY Ojeda. NIH score is zero.

## 2022-12-28 NOTE — ED TRIAGE NOTES
Patient presents in wheelchair to triage in no apparent distress accompanied by wife. Patient sts that on 12/26 around 1300 he had a sudden onset of dizziness that lead to some nausea. Pt sts he has had associated blurry vision with the dizziness and nausea. Dizziness and nausea relieved with laying down and exacerbated by position changes. Pt denies hx of vertigo, stroke, confirms hx of MI with 1 stent placement. Pt no longer on blood thinners. PT NIH 0.     Pt seen by JASVIR Walsh in triage.

## 2022-12-28 NOTE — ED PROVIDER NOTES
Emergency Department Provider Note                   PCP:                Arin Huynh MD, MD               Age: 63 y.o. Sex: male       ICD-10-CM    1. Dizziness  R42           DISPOSITION         MDM  Number of Diagnoses or Management Options  Dizziness  Diagnosis management comments: Patient presents to facility today with acute onset of some dizziness. Patient had some improvement in symptoms with fluids and meclizine however he continues to be dizzy and have some slightly blurred vision. CT head was normal.  Given these findings however CTA with and without contrast of the head and neck was ordered and is currently pending. At time of shift and I have discussed the patient with my ED attending regarding further management. If there is no perceived blockage on the CTA we will admit for further MRI. If blockage is noted patient will necessitate transfer to facility that can further administer appropriate care. This patient was discussed and reviewed with my attending who is in agreement with the plan of care at this time and will dispo accordingly pending those results. Patient currently stable. Amount and/or Complexity of Data Reviewed  Clinical lab tests: ordered and reviewed  Tests in the radiology section of CPT®: ordered and reviewed  Tests in the medicine section of CPT®: ordered and reviewed  Review and summarize past medical records: yes  Discuss the patient with other providers: yes (Dr. NARAYANAN [facility], ED attending)  Independent visualization of images, tracings, or specimens: yes    Risk of Complications, Morbidity, and/or Mortality  Presenting problems: moderate  Diagnostic procedures: moderate  Management options: moderate  General comments: CT HEAD WO CONTRAST   Final Result    1. No CT evidence of acute intracranial process. XR CHEST PORTABLE   Final Result    No evidence of an acute intrathoracic process.          CTA HEAD NECK W WO CONTRAST    (Results Pending)  The patient was observed in the ED.    Results Reviewed:      Recent Results (from the past 24 hour(s))  -EKG 12 Lead:   Collection Time: 12/27/22 11:54 PM       Result                      Value             Ref Range           Ventricular Rate            76                BPM                 Atrial Rate                 76                BPM                 P-R Interval                156               ms                  QRS Duration                88                ms                  Q-T Interval                378               ms                  QTc Calculation (Bazet*     425               ms                  P Axis                      24                degrees             R Axis                      50                degrees             T Axis                      22                degrees             Diagnosis                                                     Normal sinus rhythm  -CBC:   Collection Time: 12/27/22 11:56 PM       Result                      Value             Ref Range           WBC                         10.4              4.3 - 11.1 K*       RBC                         5.41              4.23 - 5.6 M*       Hemoglobin                  16.5              13.6 - 17.2 *       Hematocrit                  48.3              41.1 - 50.3 %       MCV                         89.3              82 - 102 FL         MCH                         30.5              26.1 - 32.9 *       MCHC                        34.2              31.4 - 35.0 *       RDW                         12.0              11.9 - 14.6 %       Platelets                   274               150 - 450 K/*       MPV                         9.6               9.4 - 12.3 FL       nRBC                        0.00              0.0 - 0.2 K/*  -COVID-19, Rapid:   Collection Time: 12/27/22 11:58 PM  Specimen: Nasopharyngeal       Result                      Value             Ref Range           Source                      NASAL                                  SARS-CoV-2, Rapid           Not detected      NOTD           -Rapid influenza A/B antigens:   Collection Time: 12/27/22 11:58 PM  Specimen: Nasal Washing       Result                      Value             Ref Range           Influenza A Ag              Negative          NEG                 Influenza B Ag              Negative          NEG                 Source                                                        Nasopharyngeal  -Troponin:   Collection Time: 12/27/22 11:58 PM       Result                      Value             Ref Range           Troponin, High Sensiti*     3.4               0 - 14 pg/mL   -Comprehensive Metabolic Panel:   Collection Time: 12/27/22 11:58 PM       Result                      Value             Ref Range           Sodium                      140               133 - 143 mm*       Potassium                   3.7               3.5 - 5.1 mm*       Chloride                    105               101 - 110 mm*       CO2                         27                21 - 32 mmol*       Anion Gap                   8                 2 - 11 mmol/L       Glucose                     176 (H)           65 - 100 mg/*       BUN                         14                8 - 23 MG/DL        Creatinine                  1.20              0.8 - 1.5 MG*       Est, Glom Filt Rate         >60               >60 ml/min/1*       Calcium                     9.4               8.3 - 10.4 M*       Total Bilirubin             0.6               0.2 - 1.1 MG*       ALT                         27                12 - 65 U/L         AST                         11 (L)            15 - 37 U/L         Alk Phosphatase             86                50 - 136 U/L        Total Protein               7.9               6.3 - 8.2 g/*       Albumin                     3.9               3.2 - 4.6 g/*       Globulin                    4.0               2.8 - 4.5 g/*       Albumin/Globulin Ratio      1.0               0.4 - 1.6      -POCT Urinalysis no Micro:   Collection Time: 12/28/22  1:53 AM       Result                      Value             Ref Range           Specific Gravity, Urin*     >1.030 (H)        1.001 - 1.023       pH, Urine, POC              5.5               5.0 - 9.0           Protein, Urine, POC         Negative          NEG mg/dL           Glucose, UA POC             Negative          NEG mg/dL           Ketones, Urine, POC         Negative          NEG mg/dL           Bilirubin, Urine, POC       Negative          NEG                 Blood, UA POC               Negative          NEG                 URINE UROBILINOGEN POC      1.0               0.2 - 1.0 EU*       Nitrate, Urine, POC         Negative          NEG                 Leukocyte Est, UA POC       Negative          NEG                 Performed by:                                                 [facility]  -Urinalysis:   Collection Time: 12/28/22  2:32 AM       Result                      Value             Ref Range           Color, UA                   YELLOW/STRAW                          Appearance                  CLOUDY                                Specific Gravity, UA        1.034 (H)         1.001 - 1.02*       pH, Urine                   5.5               5.0 - 9.0           Protein, UA                 TRACE (A)         NEG mg/dL           Glucose, UA                 Negative          mg/dL               Ketones, Urine              TRACE (A)         NEG mg/dL           Bilirubin Urine             Negative          NEG                 Blood, Urine                Negative          NEG                 Urobilinogen, Urine         1.0               0.2 - 1.0 EU*       Nitrite, Urine              Negative          NEG                 Leukocyte Esterase, Ur*     Negative          NEG                 WBC, UA                     0-4               U4 /hpf             RBC, UA                     0-5               U5 /hpf             Epithelial Cells UA         0-5 U5 /hpf             BACTERIA, URINE             Negative          NEG /hpf            Casts                       0-2               U2 /lpf        -Troponin:   Collection Time: 12/28/22  2:32 AM       Result                      Value             Ref Range           Troponin, High Sensiti*     3.8               0 - 14 pg/mL       Patient Progress  Patient progress: stable                              Orders Placed This Encounter   Procedures    COVID-19, Rapid    Rapid influenza A/B antigens    XR CHEST PORTABLE    CT HEAD WO CONTRAST    CTA HEAD NECK W WO CONTRAST    CBC    Urinalysis    Troponin    Comprehensive Metabolic Panel    Orthostatic blood pressure and pulse    POCT Urinalysis no Micro    EKG 12 Lead        Medications   0.9 % sodium chloride bolus (100 mLs IntraVENous New Bag 12/28/22 0800)   0.9 % sodium chloride bolus (0 mLs IntraVENous Stopped 12/28/22 0400)   ondansetron (ZOFRAN) injection 4 mg (4 mg IntraVENous Given 12/28/22 0257)   meclizine (ANTIVERT) tablet 25 mg (25 mg Oral Given 12/28/22 0257)   aluminum & magnesium hydroxide-simethicone (MAALOX) 200-200-20 MG/5ML suspension 30 mL (30 mLs Oral Given 12/28/22 0306)   lidocaine viscous hcl (XYLOCAINE) 2 % solution 15 mL (15 mLs Mouth/Throat Given 12/28/22 0306)   0.9 % sodium chloride bolus (1,000 mLs IntraVENous New Bag 12/28/22 0439)   sodium chloride flush 0.9 % injection 10 mL (10 mLs IntraVENous Given 12/28/22 0800)   iopamidol (ISOVUE-370) 76 % injection 50 mL (50 mLs IntraVENous Given 12/28/22 0800)       New Prescriptions    No medications on file        Mark Cowan is a 63 y.o. male who presents to the Emergency Department with chief complaint of    Chief Complaint   Patient presents with    Dizziness      Patient is a 53-year-old male who had acute onset of dizziness that started at 4 PM Monday. He states it seems to be fairly persistent.   He is noticing when he walks and he feels like things are moving back and forth on him. He states he does not have double vision but he feels a little bit blurry. He has had nausea and vomiting. He has not had a headache. The dizziness worsens with position changes. He states he has never experienced anything like this before. He has no history of vertigo or stroke. He states he has had an MI once in the past and had to have a stent placed. He is not taking blood thinners. He denies any perceived extremity weakness. He is having no issues with speech. No sensory deficits. No other history to report at this time. The history is provided by the patient. All other systems reviewed and are negative unless otherwise stated in the history of present illness section. Review of Systems   Constitutional:  Negative for chills and fever. HENT:  Negative for congestion, ear pain, rhinorrhea and sore throat. Eyes:  Positive for visual disturbance. Negative for photophobia and pain. Respiratory:  Negative for cough and shortness of breath. Cardiovascular:  Negative for chest pain. Gastrointestinal:  Positive for nausea and vomiting. Negative for abdominal pain. Genitourinary:  Negative for dysuria, frequency and urgency. Neurological:  Positive for dizziness. Negative for syncope and headaches. All other systems reviewed and are negative. No past medical history on file.      Past Surgical History:   Procedure Laterality Date    CARDIAC CATHETERIZATION  08/15/2021    TONSILLECTOMY  1972        Family History   Problem Relation Age of Onset    Cancer Paternal Grandfather     No Known Problems Mother     Heart Disease Father         CABG at age 52    No Known Problems Paternal Grandmother     Heart Disease Maternal Grandfather     Heart Disease Maternal Grandmother         Social History     Socioeconomic History    Marital status:    Occupational History    Occupation:    Tobacco Use    Smoking status: Never    Smokeless tobacco: Never Vaping Use    Vaping Use: Never used   Substance and Sexual Activity    Alcohol use: Yes    Drug use: Never    Sexual activity: Never     Birth control/protection: None        Allergies: Patient has no known allergies. Previous Medications    ASPIRIN 81 MG CHEWABLE TABLET    Take 81 mg by mouth daily    ATORVASTATIN (LIPITOR) 80 MG TABLET    Take 1 tablet by mouth in the morning. CLOTRIMAZOLE (LOTRIMIN) 1 % CREAM    Apply topically 2 times daily Apply topically 2 times daily. METOPROLOL SUCCINATE (TOPROL XL) 25 MG EXTENDED RELEASE TABLET    Take 1 tablet by mouth in the morning. NITROGLYCERIN (NITROSTAT) 0.4 MG SL TABLET    Place 0.4 mg under the tongue        Vitals signs and nursing note reviewed. No data found. Physical Exam  Vitals and nursing note reviewed. Constitutional:       General: He is not in acute distress. Appearance: Normal appearance. He is not ill-appearing or toxic-appearing. HENT:      Head: Normocephalic and atraumatic. Right Ear: External ear normal.      Left Ear: External ear normal.   Eyes:      Extraocular Movements: Extraocular movements intact. Conjunctiva/sclera: Conjunctivae normal.      Pupils: Pupils are equal, round, and reactive to light. Neck:      Vascular: No carotid bruit. Cardiovascular:      Rate and Rhythm: Normal rate and regular rhythm. Pulses: Normal pulses. Heart sounds: Normal heart sounds. Pulmonary:      Effort: Pulmonary effort is normal.      Breath sounds: Normal breath sounds. Abdominal:      General: Abdomen is flat. Bowel sounds are normal. There is no distension. Palpations: Abdomen is soft. Tenderness: There is no abdominal tenderness. There is no guarding or rebound. Musculoskeletal:         General: Normal range of motion. Cervical back: Normal range of motion. Skin:     General: Skin is warm and dry. Capillary Refill: Capillary refill takes less than 2 seconds.    Neurological: General: No focal deficit present. Mental Status: He is alert and oriented to person, place, and time. Sensory: No sensory deficit. Motor: No weakness. Coordination: Coordination normal.      Gait: Gait normal.      Comments: Normal finger to nose and heel to shin   Psychiatric:         Mood and Affect: Mood normal.         Behavior: Behavior normal.        Procedures    ED EKG Interpretation  EKG was interpreted in the absence of a cardiologist.    Rate: 72  EKG Interpretation: EKG Interpretation: sinus rhythm  ST Segments: Nonspecific ST segments - NO STEMI    Results for orders placed or performed during the hospital encounter of 12/28/22   COVID-19, Rapid    Specimen: Nasopharyngeal   Result Value Ref Range    Source NASAL      SARS-CoV-2, Rapid Not detected NOTD     Rapid influenza A/B antigens    Specimen: Nasal Washing   Result Value Ref Range    Influenza A Ag Negative NEG      Influenza B Ag Negative NEG      Source Nasopharyngeal     XR CHEST PORTABLE    Narrative    EXAM: XR CHEST PORTABLE    HISTORY: dizziness. TECHNIQUE: Frontal chest.    COMPARISON: 8/13/2021     FINDINGS:   The cardiac silhouette, mediastinum, and pulmonary vasculature are within normal  limits. There is no consolidation, pleural effusion, or pneumothorax. No significant osseous abnormalities are observed. Impression    No evidence of an acute intrathoracic process. CT HEAD WO CONTRAST    Narrative    EXAM: CT HEAD WO CONTRAST    HISTORY:  Reason for exam:->persistent dizziness. TECHNIQUE: Axial images of the brain were performed without the administration  of intravenous contrast. Images were obtained axial plane and coronal  reformatted images were submitted. Dose reduction technique used: Automated exposure control/Adjustment of the mA  and/or kV according to patient size/Use of iterative reconstruction technique. COMPARISON: None.     FINDINGS:   There is no evidence of acute intracranial hemorrhage, midline shift, or mass  effect. No intra or extra-axial fluid collections are observed. No evidence of acute confluent territorial infarction. Ventricles and basal cisterns are patent and symmetric. Visualized paranasal sinuses and mastoid air cells are without significant  fluid. Scalp, soft tissues, and calvarium are within normal limits. Impression    1. No CT evidence of acute intracranial process.      CBC   Result Value Ref Range    WBC 10.4 4.3 - 11.1 K/uL    RBC 5.41 4.23 - 5.6 M/uL    Hemoglobin 16.5 13.6 - 17.2 g/dL    Hematocrit 48.3 41.1 - 50.3 %    MCV 89.3 82 - 102 FL    MCH 30.5 26.1 - 32.9 PG    MCHC 34.2 31.4 - 35.0 g/dL    RDW 12.0 11.9 - 14.6 %    Platelets 580 092 - 847 K/uL    MPV 9.6 9.4 - 12.3 FL    nRBC 0.00 0.0 - 0.2 K/uL   Urinalysis   Result Value Ref Range    Color, UA YELLOW/STRAW      Appearance CLOUDY      Specific Gravity, UA 1.034 (H) 1.001 - 1.023      pH, Urine 5.5 5.0 - 9.0      Protein, UA TRACE (A) NEG mg/dL    Glucose, UA Negative mg/dL    Ketones, Urine TRACE (A) NEG mg/dL    Bilirubin Urine Negative NEG      Blood, Urine Negative NEG      Urobilinogen, Urine 1.0 0.2 - 1.0 EU/dL    Nitrite, Urine Negative NEG      Leukocyte Esterase, Urine Negative NEG      WBC, UA 0-4 U4 /hpf    RBC, UA 0-5 U5 /hpf    Epithelial Cells UA 0-5 U5 /hpf    BACTERIA, URINE Negative NEG /hpf    Casts 0-2 U2 /lpf   Troponin   Result Value Ref Range    Troponin, High Sensitivity 3.4 0 - 14 pg/mL   Troponin   Result Value Ref Range    Troponin, High Sensitivity 3.8 0 - 14 pg/mL   Comprehensive Metabolic Panel   Result Value Ref Range    Sodium 140 133 - 143 mmol/L    Potassium 3.7 3.5 - 5.1 mmol/L    Chloride 105 101 - 110 mmol/L    CO2 27 21 - 32 mmol/L    Anion Gap 8 2 - 11 mmol/L    Glucose 176 (H) 65 - 100 mg/dL    BUN 14 8 - 23 MG/DL    Creatinine 1.20 0.8 - 1.5 MG/DL    Est, Glom Filt Rate >60 >60 ml/min/1.73m2    Calcium 9.4 8.3 - 10.4 MG/DL    Total Bilirubin 0.6 0.2 - 1.1 MG/DL    ALT 27 12 - 65 U/L    AST 11 (L) 15 - 37 U/L    Alk Phosphatase 86 50 - 136 U/L    Total Protein 7.9 6.3 - 8.2 g/dL    Albumin 3.9 3.2 - 4.6 g/dL    Globulin 4.0 2.8 - 4.5 g/dL    Albumin/Globulin Ratio 1.0 0.4 - 1.6     POCT Urinalysis no Micro   Result Value Ref Range    Specific Gravity, Urine, POC >1.030 (H) 1.001 - 1.023    pH, Urine, POC 5.5 5.0 - 9.0      Protein, Urine, POC Negative NEG mg/dL    Glucose, UA POC Negative NEG mg/dL    Ketones, Urine, POC Negative NEG mg/dL    Bilirubin, Urine, POC Negative NEG      Blood, UA POC Negative NEG      URINE UROBILINOGEN POC 1.0 0.2 - 1.0 EU/dL    Nitrate, Urine, POC Negative NEG      Leukocyte Est, UA POC Negative NEG      Performed by: Ray Manzano    EKG 12 Lead   Result Value Ref Range    Ventricular Rate 76 BPM    Atrial Rate 76 BPM    P-R Interval 156 ms    QRS Duration 88 ms    Q-T Interval 378 ms    QTc Calculation (Bazett) 425 ms    P Axis 24 degrees    R Axis 50 degrees    T Axis 22 degrees    Diagnosis Normal sinus rhythm         CT HEAD WO CONTRAST   Final Result   1. No CT evidence of acute intracranial process. XR CHEST PORTABLE   Final Result   No evidence of an acute intrathoracic process. CTA HEAD NECK W WO CONTRAST    (Results Pending)        NIH Stroke Scale  Interval: Baseline  Level of Consciousness (1a): Alert  LOC Questions (1b): Answers both correctly  LOC Commands (1c): Performs both tasks correctly  Best Gaze (2): Normal  Visual (3): No visual loss  Facial Palsy (4): Normal symmetrical movement  Motor Arm, Left (5a): No drift  Motor Arm, Right (5b): No drift  Motor Leg, Left (6a): No drift  Motor Leg, Right (6b): No drift  Limb Ataxia (7): Absent  Best Language (9): No aphasia  Dysarthria (10): Normal  Extinction and Inattention (11): No abnormality                Voice dictation software was used during the making of this note.   This software is not perfect and grammatical and other typographical errors may be present.  This note has not been completely proofread for errors.     Giorgi Walsh PA-C  12/28/22 0848

## 2022-12-28 NOTE — ED NOTES
This patient was seen by [facility], patient had been complaining of dizziness. The symptoms have been well over 24 hours and did not qualify for code stroke. CT brain and CTA were both negative. The patient continued to have concerning symptoms of dizziness and nystagmus without provocation. He has nontoxic with stable vital signs and labs did not show any causation for this. Due to the symptoms the patient will be admitted. Patient will be admitted to Dr. Purvi Agustin for consult continued dizziness.   Patient is stable at the time of admission     Arden Alonzo MD  12/28/22 1132

## 2022-12-28 NOTE — Clinical Note
Discharge Plan[de-identified] Extended Care Facility (e.g. Adult Home, Nursing Home, etc.)   Telemetry/Cardiac Monitoring Required?: Yes   Bed request comments: ICU for posterior stroke unless MRI is neg.

## 2022-12-28 NOTE — PROGRESS NOTES
Pt assisted to bathroom. Pt had bowel movement, gait even and steady. Yellow non-skid socks applied to pt's feet and pt assisted to bed and given dinner tray.

## 2022-12-28 NOTE — ED NOTES
TRANSFER - OUT REPORT:    Verbal report given to [facility], RN on Johnny Colon  being transferred to [phone] for routine progression of patient care       Report consisted of patient's Situation, Background, Assessment and   Recommendations(SBAR). Information from the following report(s) ED SBAR was reviewed with the receiving nurse. Bethany Assessment: Presents to emergency department  because of falls (Syncope, seizure, or loss of consciousness): No, Age > 79: No, Altered Mental Status, Intoxication with alcohol or substance confusion (Disorientation, impaired judgment, poor safety awaremess, or inability to follow instructions): No, Impaired Mobility: Ambulates or transfers with assistive devices or assistance; Unable to ambulate or transer.: No, Nursing Judgement: Yes  Lines:   Peripheral IV 12/28/22 Distal;Left Cephalic (Active)        Opportunity for questions and clarification was provided.       Patient transported with:  Registered Nurse           Anant Chadwick RN  12/28/22 3353

## 2022-12-28 NOTE — H&P
Hospitalist History and Physical   Admit Date:  2022  1:19 AM   Name:  Michaelle Mehta   Age:  63 y.o. Sex:  male  :  1960   MRN:  [id number]   Room:  [place] [address]    Presenting Complaint: Dizziness     Reason(s) for Admission: Dizziness [R42]     History of Present Illness:   Michaelle Mehta is a 63 y.o. male with medical history of HTN, CAD s/p PCI who presented with dizziness that started 2 days prior to presentation. Patient reports that dizziness is associated with blurry vision as if the image that he is looking at is moving. Reports worsening symptoms with movement of his head, sitting up or walking. Denies any prior episodes. Reports having nausea along with dizziness but otherwise no fever, chills, chest pain, shortness of breath, abdominal pain. Stroke code was not called upon presentation given onset of symptoms. CT and CTA were negative. Patient continues to have dizziness with nystagmus. Laboratory work-up has been unremarkable. Review of Systems:  10 systems reviewed and negative except as noted in HPI. Assessment & Plan:     Dizziness with horizontal nystagmus  CT and CTA without any abnormalities. MRI brain with single punctate focus of T2 hyperintensity within the right frontal lobe white matter  -Consult neurology  -echocardiogram and telemetry monitoring  -lipid and a1c    HTN // CAD s/p PCI  -Continue aspirin, Lipitor  -Continue with metoprolol     Diet: ADULT DIET; Regular; Low Fat/Low Chol/High Fiber/FORREST  VTE ppx: lovenox  Code status: Full Code    Hospital Problems:  Principal Problem:    Dizziness  Active Problems:    Severe obesity (BMI 35.0-39. 9) with comorbidity ([address]    CAD (coronary artery disease)    HTN (hypertension)  Resolved Problems:    * No resolved hospital problems.  *       Past History:     Past Medical History: HTN, CAD s/p PIC    Past Surgical History:   Procedure Laterality Date    CARDIAC CATHETERIZATION  08/15/2021    TONSILLECTOMY  1972 Social History     Tobacco Use    Smoking status: Never    Smokeless tobacco: Never   Substance Use Topics    Alcohol use: Yes      Social History     Substance and Sexual Activity   Drug Use Never       Family History   Problem Relation Age of Onset    Cancer Paternal Grandfather     No Known Problems Mother     Heart Disease Father         CABG at age 52    No Known Problems Paternal Grandmother     Heart Disease Maternal Grandfather     Heart Disease Maternal Grandmother         Immunization History   Administered Date(s) Administered    Influenza, FLUCELVAX, (age 10 mo+), MDCK, PF, 0.5mL 10/21/2022     No Known Allergies  Prior to Admit Medications:  Current Outpatient Medications   Medication Instructions    aspirin 81 mg, Oral, DAILY    atorvastatin (LIPITOR) 80 mg, Oral, DAILY    clotrimazole (LOTRIMIN) 1 % cream Topical, 2 TIMES DAILY, Apply topically 2 times daily.     metoprolol succinate (TOPROL XL) 25 mg, Oral, DAILY    nitroGLYCERIN (NITROSTAT) 0.4 mg, SubLINGual         Objective:   Patient Vitals for the past 24 hrs:   Temp Pulse Resp BP SpO2   12/28/22 1551 -- 90 20 137/88 94 %   12/28/22 1415 -- 93 18 (!) 151/88 96 %   12/28/22 1400 -- 85 15 (!) 147/81 --   12/28/22 1345 -- 81 17 133/81 94 %   12/28/22 1330 -- 84 18 132/79 95 %   12/28/22 1315 -- 83 19 (!) 141/81 95 %   12/28/22 1300 -- 92 20 138/83 94 %   12/28/22 1245 -- 89 19 (!) 153/92 95 %   12/28/22 1230 -- 89 13 (!) 146/91 95 %   12/28/22 1215 -- 90 21 138/84 94 %   12/28/22 1200 -- 95 21 (!) 150/84 94 %   12/28/22 1145 -- 100 23 (!) 160/98 97 %   12/28/22 1130 -- 82 18 126/73 93 %   12/28/22 1115 -- 81 12 (!) 138/97 96 %   12/28/22 1100 -- 78 17 130/80 93 %   12/28/22 1045 -- 87 15 136/84 96 %   12/28/22 1030 -- 81 16 131/82 94 %   12/28/22 1015 -- 83 17 125/78 91 %   12/28/22 1000 -- 76 17 (!) 141/79 90 %   12/28/22 0945 -- 81 17 135/79 92 %   12/28/22 0234 -- 85 16 (!) 142/82 98 %   12/27/22 2337 97.9 °F (36.6 °C) 85 16 (!) 141/106 94 % Oxygen Therapy  SpO2: 94 %  Pulse via Oximetry: 88 beats per minute  Pulse Oximeter Device Mode: Intermittent  O2 Device: None (Room air)    Estimated body mass index is 36.62 kg/m² as calculated from the following:    Height as of this encounter: 6' (1.829 m). Weight as of this encounter: 270 lb (122.5 kg). No intake or output data in the 24 hours ending 12/28/22 1708      Physical Exam:    Blood pressure 137/88, pulse 90, temperature 97.9 °F (36.6 °C), temperature source Oral, resp. rate 20, height 6' (1.829 m), weight 270 lb (122.5 kg), SpO2 94 %. General:    Well nourished. Head:  Normocephalic, atraumatic  Eyes:  Sclerae appear normal.  Pupils equally round. ENT:  Nares appear normal, no drainage. Moist oral mucosa  Neck:  No restricted ROM. Trachea midline   CV:   RRR. No m/r/g. No jugular venous distension. Lungs:   CTAB. No wheezing  Symmetric expansion. Abdomen: Bowel sounds present. Soft, nontender, nondistended. Extremities: No cyanosis or clubbing. No edema  Skin:     No rashes and normal coloration. Warm and dry. Neuro:  +horizontal nystagmus,  A&Ox3, strength 5/5 throughout  Psych:  Normal mood and affect.       I have personally reviewed labs and tests showing:  Recent Labs:  Recent Results (from the past 24 hour(s))   EKG 12 Lead    Collection Time: 12/27/22 11:54 PM   Result Value Ref Range    Ventricular Rate 76 BPM    Atrial Rate 76 BPM    P-R Interval 156 ms    QRS Duration 88 ms    Q-T Interval 378 ms    QTc Calculation (Bazett) 425 ms    P Axis 24 degrees    R Axis 50 degrees    T Axis 22 degrees    Diagnosis Normal sinus rhythm    CBC    Collection Time: 12/27/22 11:56 PM   Result Value Ref Range    WBC 10.4 4.3 - 11.1 K/uL    RBC 5.41 4.23 - 5.6 M/uL    Hemoglobin 16.5 13.6 - 17.2 g/dL    Hematocrit 48.3 41.1 - 50.3 %    MCV 89.3 82 - 102 FL    MCH 30.5 26.1 - 32.9 PG    MCHC 34.2 31.4 - 35.0 g/dL    RDW 12.0 11.9 - 14.6 %    Platelets 336 979 - 114 K/uL    MPV 9.6 9.4 - 12.3 FL    nRBC 0.00 0.0 - 0.2 K/uL   Hemoglobin A1c    Collection Time: 12/27/22 11:56 PM   Result Value Ref Range    Hemoglobin A1C 5.9 (H) 4.8 - 5.6 %    eAG 123 mg/dL   COVID-19, Rapid    Collection Time: 12/27/22 11:58 PM    Specimen: Nasopharyngeal   Result Value Ref Range    Source NASAL      SARS-CoV-2, Rapid Not detected NOTD     Rapid influenza A/B antigens    Collection Time: 12/27/22 11:58 PM    Specimen: Nasal Washing   Result Value Ref Range    Influenza A Ag Negative NEG      Influenza B Ag Negative NEG      Source Nasopharyngeal     Troponin    Collection Time: 12/27/22 11:58 PM   Result Value Ref Range    Troponin, High Sensitivity 3.4 0 - 14 pg/mL   Comprehensive Metabolic Panel    Collection Time: 12/27/22 11:58 PM   Result Value Ref Range    Sodium 140 133 - 143 mmol/L    Potassium 3.7 3.5 - 5.1 mmol/L    Chloride 105 101 - 110 mmol/L    CO2 27 21 - 32 mmol/L    Anion Gap 8 2 - 11 mmol/L    Glucose 176 (H) 65 - 100 mg/dL    BUN 14 8 - 23 MG/DL    Creatinine 1.20 0.8 - 1.5 MG/DL    Est, Glom Filt Rate >60 >60 ml/min/1.73m2    Calcium 9.4 8.3 - 10.4 MG/DL    Total Bilirubin 0.6 0.2 - 1.1 MG/DL    ALT 27 12 - 65 U/L    AST 11 (L) 15 - 37 U/L    Alk Phosphatase 86 50 - 136 U/L    Total Protein 7.9 6.3 - 8.2 g/dL    Albumin 3.9 3.2 - 4.6 g/dL    Globulin 4.0 2.8 - 4.5 g/dL    Albumin/Globulin Ratio 1.0 0.4 - 1.6     POCT Urinalysis no Micro    Collection Time: 12/28/22  1:53 AM   Result Value Ref Range    Specific Gravity, Urine, POC >1.030 (H) 1.001 - 1.023    pH, Urine, POC 5.5 5.0 - 9.0      Protein, Urine, POC Negative NEG mg/dL    Glucose, UA POC Negative NEG mg/dL    Ketones, Urine, POC Negative NEG mg/dL    Bilirubin, Urine, POC Negative NEG      Blood, UA POC Negative NEG      URINE UROBILINOGEN POC 1.0 0.2 - 1.0 EU/dL    Nitrate, Urine, POC Negative NEG      Leukocyte Est, UA POC Negative NEG      Performed by: Brenna Harley    Urinalysis    Collection Time: 12/28/22  2:32 AM Result Value Ref Range    Color, UA YELLOW/STRAW      Appearance CLOUDY      Specific Gravity, UA 1.034 (H) 1.001 - 1.023      pH, Urine 5.5 5.0 - 9.0      Protein, UA TRACE (A) NEG mg/dL    Glucose, UA Negative mg/dL    Ketones, Urine TRACE (A) NEG mg/dL    Bilirubin Urine Negative NEG      Blood, Urine Negative NEG      Urobilinogen, Urine 1.0 0.2 - 1.0 EU/dL    Nitrite, Urine Negative NEG      Leukocyte Esterase, Urine Negative NEG      WBC, UA 0-4 U4 /hpf    RBC, UA 0-5 U5 /hpf    Epithelial Cells UA 0-5 U5 /hpf    BACTERIA, URINE Negative NEG /hpf    Casts 0-2 U2 /lpf   Troponin    Collection Time: 12/28/22  2:32 AM   Result Value Ref Range    Troponin, High Sensitivity 3.8 0 - 14 pg/mL   Lipid Panel    Collection Time: 12/28/22  3:58 PM   Result Value Ref Range    Cholesterol, Total 131 <200 MG/DL    Triglycerides 104 35 - 150 MG/DL    HDL 42 40 - 60 MG/DL    LDL Calculated 68.2 <100 MG/DL    VLDL Cholesterol Calculated 20.8 6.0 - 23.0 MG/DL    Chol/HDL Ratio 3.1         I have personally reviewed imaging studies showing:  CTA HEAD NECK W WO CONTRAST    Result Date: 12/28/2022  Title:  CT arteriogram of the neck and head. Indication: Dizziness, horizontal nystagmus. Blurred vision. Technique: Axial images of the neck and head were obtained after the uneventful administration of intravenous iodinated contrast media. Contrast was used to best identify the arterial structures. Images were reviewed on a separate, free standing, three-dimensional workstation as per the referring physicians request.  All stenosis percentages derived by comparing the narrowest segment with the distal Internal Carotid Artery luminal diameter, as described in the [place] American Symptomatic Carotid Endarterectomy Trial (NASCET) criteria. All CT scans at this facility are performed using dose reduction/dose modulation techniques, as appropriate the performed exam, including the following: Automated Exposure Control;  Adjustment of the mA and/or kV according to patient size (this includes techniques or standardized protocols for targeted exams where dose is matched to indication/reason for exam); and Use of Iterative Reconstruction Technique. Comparison: Head CT same date. Findings: Poor arterial contrast enhancement degrades the study. Lungs:  Clear. Bones:  No destructive lesion. Paranasal sinuses:  Clear. Brain:  No mass effect. Soft tissues:  Unremarkable. Superior sagittal sinus:  Patent. Right transverse sinus:  Patent. Left transverse sinus:  Patent. Aortic arch:  Mild atherosclerotic disease, patent. Right brachiocephalic artery:  Mild atherosclerotic disease, probably patent. Right subclavian artery:  Probably patent. Left subclavian artery:  Probably patent. Right common carotid artery: Tortuous, probably patent. Right external carotid artery:  Probably patent. Cervical Right internal carotid artery:  Scattered calcified plaques, minimal narrowing, probably patent. Left common carotid artery: Probably patent. Left external carotid artery:  Probably patent. Cervical Left internal carotid artery:  Scattered calcified plaques, minimal narrowing, probably patent. Right vertebral artery:  Extremely poor contrast enhancement, probably patent. Left vertebral artery:  Extremely poor contrast enhancement, probably patent. Basilar artery:  Patent. Extremely poor intracranial arterial enhancement. Extensive venous contamination. Intracranial right internal carotid artery:  Grossly patent. Right middle cerebral artery:  Grossly patent. Right anterior cerebral artery:  Grossly patent. Anterior communicating artery: Not visualized. Left anterior cerebral artery:  Grossly patent. Left middle cerebral artery:  Grossly patent. Intracranial left internal carotid artery:  Grossly patent. Right posterior communicating artery: Not visualized. Left posterior communicating artery:  Not visualized.   Right posterior cerebral artery:  Grossly patent. Left posterior cerebral artery:  Grossly patent. Exceedingly Limited examination. No obvious intracranial arterial occlusion. If stroke remains a consideration, consider MRI of the brain. CT HEAD WO CONTRAST    Result Date: 12/28/2022  EXAM: CT HEAD WO CONTRAST HISTORY:  Reason for exam:->persistent dizziness. TECHNIQUE: Axial images of the brain were performed without the administration of intravenous contrast. Images were obtained axial plane and coronal reformatted images were submitted. Dose reduction technique used: Automated exposure control/Adjustment of the mA and/or kV according to patient size/Use of iterative reconstruction technique. COMPARISON: None. FINDINGS: There is no evidence of acute intracranial hemorrhage, midline shift, or mass effect. No intra or extra-axial fluid collections are observed. No evidence of acute confluent territorial infarction. Ventricles and basal cisterns are patent and symmetric. Visualized paranasal sinuses and mastoid air cells are without significant fluid. Scalp, soft tissues, and calvarium are within normal limits. 1. No CT evidence of acute intracranial process. XR CHEST PORTABLE    Result Date: 12/28/2022  EXAM: XR CHEST PORTABLE HISTORY: dizziness. TECHNIQUE: Frontal chest. COMPARISON: 8/13/2021 FINDINGS: The cardiac silhouette, mediastinum, and pulmonary vasculature are within normal limits. There is no consolidation, pleural effusion, or pneumothorax. No significant osseous abnormalities are observed. No evidence of an acute intrathoracic process. Echocardiogram:  06/27/22    TRANSTHORACIC ECHOCARDIOGRAM (TTE) COMPLETE (CONTRAST/BUBBLE/3D PRN) 06/28/2022  7:22 AM, 06/28/2022 12:00 AM (Final)    Interpretation Summary    Left Ventricle: Normal left ventricular systolic function. EF by 2D Simpsons Biplane is 67%. Left ventricle size is normal. Normal wall thickness. Normal wall motion.  Normal diastolic function. Aortic Valve: Tricuspid valve. Mild sclerosis of the aortic valve cusp. Technical qualifiers: Color flow Doppler was performed and pulse wave and/or continuous wave Doppler was performed. Signed by: Calin Oneill MD on 6/28/2022  7:22 AM, Signed by: Unknown Provider Result on 6/28/2022 12:00 AM        Orders Placed This Encounter   Medications    0.9 % sodium chloride bolus    ondansetron (ZOFRAN) injection 4 mg    meclizine (ANTIVERT) tablet 25 mg    aluminum & magnesium hydroxide-simethicone (MAALOX) 200-200-20 MG/5ML suspension 30 mL    lidocaine viscous hcl (XYLOCAINE) 2 % solution 15 mL    0.9 % sodium chloride bolus    sodium chloride flush 0.9 % injection 10 mL    0.9 % sodium chloride bolus    iopamidol (ISOVUE-370) 76 % injection 50 mL    ondansetron (ZOFRAN) injection 4 mg    nicotine (NICODERM CQ) 14 MG/24HR 1 patch    OR Linked Order Group     acetaminophen (TYLENOL) tablet 650 mg     acetaminophen (TYLENOL) suppository 650 mg    OR Linked Order Group     ondansetron (ZOFRAN-ODT) disintegrating tablet 4 mg     ondansetron (ZOFRAN) injection 4 mg    polyethylene glycol (GLYCOLAX) packet 17 g    bisacodyl (DULCOLAX) suppository 10 mg    OR Linked Order Group     aspirin EC tablet 81 mg     aspirin suppository 300 mg    atorvastatin (LIPITOR) tablet 80 mg    labetalol (NORMODYNE;TRANDATE) injection 10 mg    enoxaparin Sodium (LOVENOX) injection 30 mg     Order Specific Question:   Indication of Use     Answer:   Prophylaxis-DVT/PE    pantoprazole (PROTONIX) tablet 40 mg    calcium carbonate (TUMS) chewable tablet 500 mg    famotidine (PEPCID) tablet 20 mg         Signed:  Ricarda Diez MD    Part of this note may have been written by using a voice dictation software. The note has been proof read but may still contain some grammatical/other typographical errors.

## 2022-12-29 ENCOUNTER — APPOINTMENT (OUTPATIENT)
Dept: NON INVASIVE DIAGNOSTICS | Age: 62
End: 2022-12-29
Payer: COMMERCIAL

## 2022-12-29 LAB
ANION GAP SERPL CALC-SCNC: 6 MMOL/L (ref 2–11)
BUN SERPL-MCNC: 14 MG/DL (ref 8–23)
CALCIUM SERPL-MCNC: 8.7 MG/DL (ref 8.3–10.4)
CHLORIDE SERPL-SCNC: 108 MMOL/L (ref 101–110)
CO2 SERPL-SCNC: 27 MMOL/L (ref 21–32)
CREAT SERPL-MCNC: 1 MG/DL (ref 0.8–1.5)
ECHO AO ASC DIAM: 3.2 CM
ECHO AO ROOT DIAM: 3.1 CM
ECHO AV AREA PEAK VELOCITY: 2.5 CM2
ECHO AV AREA VTI: 2.6 CM2
ECHO AV MEAN GRADIENT: 5 MMHG
ECHO AV MEAN VELOCITY: 1.1 M/S
ECHO AV PEAK GRADIENT: 10 MMHG
ECHO AV PEAK VELOCITY: 1.6 M/S
ECHO AV VELOCITY RATIO: 0.69
ECHO AV VTI: 31.3 CM
ECHO LA AREA 2C: 16.2 CM2
ECHO LA AREA 4C: 16.2 CM2
ECHO LA DIAMETER: 3.2 CM
ECHO LA MAJOR AXIS: 5.3 CM
ECHO LA MINOR AXIS: 4.4 CM
ECHO LA TO AORTIC ROOT RATIO: 1.03
ECHO LA VOL 2C: 48 ML (ref 18–58)
ECHO LA VOL 4C: 38 ML (ref 18–58)
ECHO LA VOL BP: 47 ML (ref 18–58)
ECHO LV E' LATERAL VELOCITY: 10 CM/S
ECHO LV E' SEPTAL VELOCITY: 9 CM/S
ECHO LV EDV A2C: 136 ML
ECHO LV EDV A4C: 113 ML
ECHO LV EJECTION FRACTION A2C: 60 %
ECHO LV EJECTION FRACTION A4C: 74 %
ECHO LV EJECTION FRACTION BIPLANE: 66 % (ref 55–100)
ECHO LV ESV A2C: 55 ML
ECHO LV ESV A4C: 29 ML
ECHO LV FRACTIONAL SHORTENING: 34 % (ref 28–44)
ECHO LV INTERNAL DIMENSION DIASTOLIC: 5.3 CM (ref 4.2–5.9)
ECHO LV INTERNAL DIMENSION SYSTOLIC: 3.5 CM
ECHO LV IVSD: 0.7 CM (ref 0.6–1)
ECHO LV MASS 2D: 127 G (ref 88–224)
ECHO LV POSTERIOR WALL DIASTOLIC: 0.7 CM (ref 0.6–1)
ECHO LV RELATIVE WALL THICKNESS RATIO: 0.26
ECHO LVOT AREA: 3.8 CM2
ECHO LVOT AV VTI INDEX: 0.67
ECHO LVOT DIAM: 2.2 CM
ECHO LVOT MEAN GRADIENT: 2 MMHG
ECHO LVOT PEAK GRADIENT: 4 MMHG
ECHO LVOT PEAK VELOCITY: 1.1 M/S
ECHO LVOT SV: 80.2 ML
ECHO LVOT VTI: 21.1 CM
ECHO MV A VELOCITY: 0.93 M/S
ECHO MV E DECELERATION TIME (DT): 211 MS
ECHO MV E VELOCITY: 0.96 M/S
ECHO MV E/A RATIO: 1.03
ECHO MV E/E' LATERAL: 9.6
ECHO MV E/E' RATIO (AVERAGED): 10.13
ECHO MV E/E' SEPTAL: 10.67
ECHO PV ACCELERATION TIME (AT): 161 MS
ECHO PV MAX VELOCITY: 1 M/S
ECHO PV PEAK GRADIENT: 4 MMHG
ECHO RV BASAL DIMENSION: 3.4 CM
ECHO RV FREE WALL PEAK S': 15 CM/S
ECHO RV INTERNAL DIMENSION: 3.9 CM
ECHO RV TAPSE: 1.9 CM (ref 1.7–?)
ECHO TV REGURGITANT MAX VELOCITY: 2.79 M/S
ECHO TV REGURGITANT PEAK GRADIENT: 31 MMHG
ERYTHROCYTE [DISTWIDTH] IN BLOOD BY AUTOMATED COUNT: 12.4 % (ref 11.9–14.6)
GLUCOSE SERPL-MCNC: 107 MG/DL (ref 65–100)
HCT VFR BLD AUTO: 45.8 % (ref 41.1–50.3)
HGB BLD-MCNC: 15.3 G/DL (ref 13.6–17.2)
MCH RBC QN AUTO: 30.2 PG (ref 26.1–32.9)
MCHC RBC AUTO-ENTMCNC: 33.4 G/DL (ref 31.4–35)
MCV RBC AUTO: 90.5 FL (ref 82–102)
NRBC # BLD: 0 K/UL (ref 0–0.2)
PLATELET # BLD AUTO: 257 K/UL (ref 150–450)
PMV BLD AUTO: 9.5 FL (ref 9.4–12.3)
POTASSIUM SERPL-SCNC: 3.8 MMOL/L (ref 3.5–5.1)
RBC # BLD AUTO: 5.06 M/UL (ref 4.23–5.6)
SODIUM SERPL-SCNC: 141 MMOL/L (ref 133–143)
WBC # BLD AUTO: 9.6 K/UL (ref 4.3–11.1)

## 2022-12-29 PROCEDURE — 97112 NEUROMUSCULAR REEDUCATION: CPT

## 2022-12-29 PROCEDURE — 97165 OT EVAL LOW COMPLEX 30 MIN: CPT

## 2022-12-29 PROCEDURE — 6360000002 HC RX W HCPCS: Performed by: INTERNAL MEDICINE

## 2022-12-29 PROCEDURE — 36415 COLL VENOUS BLD VENIPUNCTURE: CPT

## 2022-12-29 PROCEDURE — 93306 TTE W/DOPPLER COMPLETE: CPT | Performed by: INTERNAL MEDICINE

## 2022-12-29 PROCEDURE — 6370000000 HC RX 637 (ALT 250 FOR IP): Performed by: INTERNAL MEDICINE

## 2022-12-29 PROCEDURE — 97161 PT EVAL LOW COMPLEX 20 MIN: CPT

## 2022-12-29 PROCEDURE — C8929 TTE W OR WO FOL WCON,DOPPLER: HCPCS

## 2022-12-29 PROCEDURE — 85027 COMPLETE CBC AUTOMATED: CPT

## 2022-12-29 PROCEDURE — 80048 BASIC METABOLIC PNL TOTAL CA: CPT

## 2022-12-29 PROCEDURE — 97535 SELF CARE MNGMENT TRAINING: CPT

## 2022-12-29 PROCEDURE — A4216 STERILE WATER/SALINE, 10 ML: HCPCS | Performed by: INTERNAL MEDICINE

## 2022-12-29 PROCEDURE — 2580000003 HC RX 258: Performed by: INTERNAL MEDICINE

## 2022-12-29 PROCEDURE — 1100000003 HC PRIVATE W/ TELEMETRY

## 2022-12-29 PROCEDURE — 97530 THERAPEUTIC ACTIVITIES: CPT

## 2022-12-29 PROCEDURE — 95992 CANALITH REPOSITIONING PROC: CPT

## 2022-12-29 PROCEDURE — 6360000004 HC RX CONTRAST MEDICATION: Performed by: INTERNAL MEDICINE

## 2022-12-29 PROCEDURE — 92610 EVALUATE SWALLOWING FUNCTION: CPT

## 2022-12-29 RX ORDER — MECLIZINE HCL 12.5 MG/1
12.5 TABLET ORAL 3 TIMES DAILY PRN
Status: DISCONTINUED | OUTPATIENT
Start: 2022-12-29 | End: 2022-12-30 | Stop reason: HOSPADM

## 2022-12-29 RX ADMIN — TICAGRELOR 180 MG: 90 TABLET ORAL at 18:28

## 2022-12-29 RX ADMIN — ENOXAPARIN SODIUM 30 MG: 100 INJECTION SUBCUTANEOUS at 20:30

## 2022-12-29 RX ADMIN — PANTOPRAZOLE SODIUM 40 MG: 40 TABLET, DELAYED RELEASE ORAL at 05:38

## 2022-12-29 RX ADMIN — ENOXAPARIN SODIUM 30 MG: 100 INJECTION SUBCUTANEOUS at 08:31

## 2022-12-29 RX ADMIN — ATORVASTATIN CALCIUM 80 MG: 40 TABLET, FILM COATED ORAL at 20:30

## 2022-12-29 RX ADMIN — MECLIZINE 12.5 MG: 12.5 TABLET ORAL at 20:31

## 2022-12-29 RX ADMIN — PERFLUTREN 0.15 ML: 6.52 INJECTION, SUSPENSION INTRAVENOUS at 09:57

## 2022-12-29 RX ADMIN — ASPIRIN 81 MG: 81 TABLET ORAL at 08:31

## 2022-12-29 NOTE — CARE COORDINATION
Chart reviewed by . Patient is a 63year old male, admitted with Dizziness. CM met with patient to discuss discharge planning. Patient alert and oriented x4. Demographics verified. Patient lives with his spouse in a two level home with no steps. At baseline, patient is independent with completing ADL's and drives. No DME use. Patient insured with a PCP for follow up care. Patient is able to afford prescription medications. Discharge planning: PT/OT eval pending. Patient denies any hx of HH/REHAB. No CM needs expressed or identified at this time. CM will continue to follow care plan and therapy's recommendations.          12/29/22 3263   Service Assessment   Patient Orientation Alert and Oriented;Person;Place;Situation   Cognition Alert   History Provided By Patient;Medical Record   Primary Caregiver Self   Accompanied By/Relationship Spouse   Support Systems Spouse/Significant Other;Family Members   Patient's Healthcare Decision Maker is: Legal Next of Kin  (Spouse)   PCP Verified by CM Yes   Last Visit to PCP Within last 3 months   Prior Functional Level Independent in ADLs/IADLs   Current Functional Level   (pending clinical course.)   Can patient return to prior living arrangement Yes   Ability to make needs known: Good   Family able to assist with home care needs: Yes   Financial Resources Other (Comment)  ([employer])   Social/Functional History   Lives With Spouse   Type of [address] Two level   Home Access Level entry   Home Equipment   (Denies DME use.)   ADL Assistance Independent   Ambulation Assistance Independent   Transfer Assistance Independent   Active  Yes   Mode of Transportation Car   Occupation Self employed   Discharge [facility] Prior To Admission None   Potential Assistance Needed Outpatient PT/OT   Patient expects to be discharged to: [facility] Dis[facility]   Transition of [address] (CM Consult) Discharge [address] Information Provided? No   Mode of Transport at Discharge Other (see comment)  (Family.)   Confirm Follow Up Transport Self   Condition of Participation: Discharge Planning   The Plan for Transition of Care is related to the following treatment goals: pending clinical course.

## 2022-12-29 NOTE — PROGRESS NOTES
Hospitalist Progress Note   Admit Date:  2022  1:19 AM   Name:  Raj Beck   Age:  63 y.o. Sex:  male  :  1960   MRN:  [id number]   Room:  /    Presenting Complaint: Dizziness     Reason(s) for Admission: Dizziness [R42]  Cerebrovascular accident (CVA) due to occlusion of posterior cerebral artery, unspecified blood vessel laterality [facility]) [facility]     Hospital Course:   Please refer to the admission H&P for details of presentation. In summary, Raj Beck is a 63 y.o. male with medical history significant for HTN, CAD s/p PCI who presented with dizziness that started 2 days prior to presentation. CT head and CTA were negative. However due to suspicion for posterior stroke, patient was admitted for further work-up. MRI brain with single punctate focus of T2 hyperintensity within the right frontal lobe white matter    Subjective/24 hr Events (22) : Patient is seen and examined at bedside. No acute events reported overnight by nursing staff. Continues to report dizziness with blurry vision when trying to focus on an object. However, believes this is slightly improved. Being 4-5 episodes of watery diarrhea since arrival to the ED. No abdominal pain. Has left-sided abdominal pain intermittently for past week or 2. Patient denies fever, chills, chest pains, shortness of breath, n/v, abdominal pain. =    Review of Systems: 10 point review of systems is otherwise negative with the exception of the elements mentioned above. Assessment & Plan:   Dizziness with horizontal nystagmus with concern for posterior stroke  CT and CTA without any abnormalities. MRI brain with single punctate focus of T2 hyperintensity within the right frontal lobe white matter  -Neurology recs appreciated.   Start on brilinta  -telemetry monitoring  -Echo pending  -already on statin and lipitor  -need ENT workup per neuro  -PT/OT pending     HTN // CAD s/p PCI  -Continue aspirin, Lipitor  -Continue with metoprolol         Diet:  ADULT DIET; Regular; Low Fat/Low Chol/High Fiber/FORREST  DVT PPx: Lovenox  Code status: Full Code    Hospital Problems:  Principal Problem:    Dizziness  Active Problems:    Severe obesity (BMI 35.0-39. 9) with comorbidity ([address]    CAD (coronary artery disease)    HTN (hypertension)  Resolved Problems:    * No resolved hospital problems. *      Objective:   Patient Vitals for the past 24 hrs:   Temp Pulse Resp BP SpO2   12/29/22 1111 97.9 °F (36.6 °C) 73 16 (!) 146/99 94 %   12/29/22 0733 97.5 °F (36.4 °C) 76 12 (!) 147/98 94 %   12/29/22 0438 97.5 °F (36.4 °C) 75 18 (!) 157/90 96 %   12/29/22 0130 -- 69 -- -- --   12/28/22 2355 97.4 °F (36.3 °C) 73 22 (!) 140/92 97 %   12/28/22 1840 97.3 °F (36.3 °C) 88 20 (!) 156/84 93 %   12/28/22 1745 -- 82 17 130/82 93 %   12/28/22 1730 -- 79 16 134/81 94 %   12/28/22 1715 -- 82 15 135/77 93 %   12/28/22 1600 -- 93 23 (!) 151/93 95 %   12/28/22 1551 -- 90 20 137/88 94 %       Oxygen Therapy  SpO2: 94 %  Pulse via Oximetry: 82 beats per minute  Pulse Oximeter Device Mode: Intermittent  O2 Device: None (Room air)    Estimated body mass index is 36.62 kg/m² as calculated from the following:    Height as of this encounter: 6' (1.829 m). Weight as of this encounter: 270 lb (122.5 kg). No intake or output data in the 24 hours ending 12/29/22 1434      Physical Exam:     Blood pressure (!) 146/99, pulse 73, temperature 97.9 °F (36.6 °C), temperature source Oral, resp. rate 16, height 6' (1.829 m), weight 270 lb (122.5 kg), SpO2 94 %. General:          Well nourished. Head:               Normocephalic, atraumatic  Eyes:               Sclerae appear normal.  Pupils equally round. ENT:                Nares appear normal, no drainage. Moist oral mucosa  Neck:               No restricted ROM. Trachea midline   CV:                  RRR. No m/r/g. No jugular venous distension. Lungs:             CTAB.   No wheezing  Symmetric expansion. Abdomen: Bowel sounds present. Soft, nontender, nondistended. Extremities:     No cyanosis or clubbing. No edema  Skin:                No rashes and normal coloration. Warm and dry. Neuro:             +horizontal nystagmus,  A&Ox3, strength 5/5 throughout  Psych:             Normal mood and affect.        I have personally reviewed labs and tests showing:  Recent Labs:  Recent Results (from the past 48 hour(s))   EKG 12 Lead    Collection Time: 12/27/22 11:54 PM   Result Value Ref Range    Ventricular Rate 76 BPM    Atrial Rate 76 BPM    P-R Interval 156 ms    QRS Duration 88 ms    Q-T Interval 378 ms    QTc Calculation (Bazett) 425 ms    P Axis 24 degrees    R Axis 50 degrees    T Axis 22 degrees    Diagnosis Normal sinus rhythm    CBC    Collection Time: 12/27/22 11:56 PM   Result Value Ref Range    WBC 10.4 4.3 - 11.1 K/uL    RBC 5.41 4.23 - 5.6 M/uL    Hemoglobin 16.5 13.6 - 17.2 g/dL    Hematocrit 48.3 41.1 - 50.3 %    MCV 89.3 82 - 102 FL    MCH 30.5 26.1 - 32.9 PG    MCHC 34.2 31.4 - 35.0 g/dL    RDW 12.0 11.9 - 14.6 %    Platelets 777 048 - 820 K/uL    MPV 9.6 9.4 - 12.3 FL    nRBC 0.00 0.0 - 0.2 K/uL   Hemoglobin A1c    Collection Time: 12/27/22 11:56 PM   Result Value Ref Range    Hemoglobin A1C 5.9 (H) 4.8 - 5.6 %    eAG 123 mg/dL   COVID-19, Rapid    Collection Time: 12/27/22 11:58 PM    Specimen: Nasopharyngeal   Result Value Ref Range    Source NASAL      SARS-CoV-2, Rapid Not detected NOTD     Rapid influenza A/B antigens    Collection Time: 12/27/22 11:58 PM    Specimen: Nasal Washing   Result Value Ref Range    Influenza A Ag Negative NEG      Influenza B Ag Negative NEG      Source Nasopharyngeal     Troponin    Collection Time: 12/27/22 11:58 PM   Result Value Ref Range    Troponin, High Sensitivity 3.4 0 - 14 pg/mL   Comprehensive Metabolic Panel    Collection Time: 12/27/22 11:58 PM   Result Value Ref Range    Sodium 140 133 - 143 mmol/L    Potassium 3.7 3.5 - 5.1 mmol/L    Chloride 105 101 - 110 mmol/L    CO2 27 21 - 32 mmol/L    Anion Gap 8 2 - 11 mmol/L    Glucose 176 (H) 65 - 100 mg/dL    BUN 14 8 - 23 MG/DL    Creatinine 1.20 0.8 - 1.5 MG/DL    Est, Glom Filt Rate >60 >60 ml/min/1.73m2    Calcium 9.4 8.3 - 10.4 MG/DL    Total Bilirubin 0.6 0.2 - 1.1 MG/DL    ALT 27 12 - 65 U/L    AST 11 (L) 15 - 37 U/L    Alk Phosphatase 86 50 - 136 U/L    Total Protein 7.9 6.3 - 8.2 g/dL    Albumin 3.9 3.2 - 4.6 g/dL    Globulin 4.0 2.8 - 4.5 g/dL    Albumin/Globulin Ratio 1.0 0.4 - 1.6     POCT Urinalysis no Micro    Collection Time: 12/28/22  1:53 AM   Result Value Ref Range    Specific Gravity, Urine, POC >1.030 (H) 1.001 - 1.023    pH, Urine, POC 5.5 5.0 - 9.0      Protein, Urine, POC Negative NEG mg/dL    Glucose, UA POC Negative NEG mg/dL    Ketones, Urine, POC Negative NEG mg/dL    Bilirubin, Urine, POC Negative NEG      Blood, UA POC Negative NEG      URINE UROBILINOGEN POC 1.0 0.2 - 1.0 EU/dL    Nitrate, Urine, POC Negative NEG      Leukocyte Est, UA POC Negative NEG      Performed by: Marleni Rinaldi    Urinalysis    Collection Time: 12/28/22  2:32 AM   Result Value Ref Range    Color, UA YELLOW/STRAW      Appearance CLOUDY      Specific Gravity, UA 1.034 (H) 1.001 - 1.023      pH, Urine 5.5 5.0 - 9.0      Protein, UA TRACE (A) NEG mg/dL    Glucose, UA Negative mg/dL    Ketones, Urine TRACE (A) NEG mg/dL    Bilirubin Urine Negative NEG      Blood, Urine Negative NEG      Urobilinogen, Urine 1.0 0.2 - 1.0 EU/dL    Nitrite, Urine Negative NEG      Leukocyte Esterase, Urine Negative NEG      WBC, UA 0-4 U4 /hpf    RBC, UA 0-5 U5 /hpf    Epithelial Cells UA 0-5 U5 /hpf    BACTERIA, URINE Negative NEG /hpf    Casts 0-2 U2 /lpf   Troponin    Collection Time: 12/28/22  2:32 AM   Result Value Ref Range    Troponin, High Sensitivity 3.8 0 - 14 pg/mL   Lipid Panel    Collection Time: 12/28/22  3:58 PM   Result Value Ref Range    Cholesterol, Total 131 <200 MG/DL    Triglycerides 104 35 - 150 MG/DL    HDL 42 40 - 60 MG/DL    LDL Calculated 68.2 <100 MG/DL    VLDL Cholesterol Calculated 20.8 6.0 - 23.0 MG/DL    Chol/HDL Ratio 3.1     Basic Metabolic Panel w/ Reflex to MG    Collection Time: 12/29/22  7:17 AM   Result Value Ref Range    Sodium 141 133 - 143 mmol/L    Potassium 3.8 3.5 - 5.1 mmol/L    Chloride 108 101 - 110 mmol/L    CO2 27 21 - 32 mmol/L    Anion Gap 6 2 - 11 mmol/L    Glucose 107 (H) 65 - 100 mg/dL    BUN 14 8 - 23 MG/DL    Creatinine 1.00 0.8 - 1.5 MG/DL    Est, Glom Filt Rate >60 >60 ml/min/1.73m2    Calcium 8.7 8.3 - 10.4 MG/DL   CBC    Collection Time: 12/29/22  7:17 AM   Result Value Ref Range    WBC 9.6 4.3 - 11.1 K/uL    RBC 5.06 4.23 - 5.6 M/uL    Hemoglobin 15.3 13.6 - 17.2 g/dL    Hematocrit 45.8 41.1 - 50.3 %    MCV 90.5 82 - 102 FL    MCH 30.2 26.1 - 32.9 PG    MCHC 33.4 31.4 - 35.0 g/dL    RDW 12.4 11.9 - 14.6 %    Platelets 065 254 - 765 K/uL    MPV 9.5 9.4 - 12.3 FL    nRBC 0.00 0.0 - 0.2 K/uL   Transthoracic echocardiogram (TTE) complete with contrast, bubble, strain, and 3D PRN    Collection Time: 12/29/22  9:59 AM   Result Value Ref Range    LV EDV A2C 136 mL    LV EDV A4C 113 mL    LV ESV A2C 55 mL    LV ESV A4C 29 mL    IVSd 0.7 0.6 - 1.0 cm    LVIDd 5.3 4.2 - 5.9 cm    LVIDs 3.5 cm    LVOT Diameter 2.2 cm    LVOT Mean Gradient 2 mmHg    LVOT VTI 21.1 cm    LVOT Peak Velocity 1.1 m/s    LVOT Peak Gradient 4 mmHg    LVPWd 0.7 0.6 - 1.0 cm    LV E' Lateral Velocity 10 cm/s    LV E' Septal Velocity 9 cm/s    LV Ejection Fraction A2C 60 %    LV Ejection Fraction A4C 74 %    EF BP 66 55 - 100 %    LVOT Area 3.8 cm2    LVOT SV 80.2 ml    LA Minor Axis 4.4 cm    LA Major [place] 5.3 cm    LA Area 2C 16.2 cm2    LA Area 4C 16.2 cm2    LA Volume 2C 48 18 - 58 mL    LA Volume 4C 38 18 - 58 mL    LA Volume BP 47 18 - 58 mL    LA Diameter 3.2 cm    AV Mean Velocity 1.1 m/s    AV Mean Gradient 5 mmHg    AV VTI 31.3 cm    AV Peak Velocity 1.6 m/s    AV Peak Gradient 10 mmHg    AV Area by VTI 2.6 cm2    AV Area by Peak Velocity 2.5 cm2    Aortic Root 3.1 cm    Ascending Aorta 3.2 cm    MV E Wave Deceleration Time 211.0 ms    MV A Velocity 0.93 m/s    MV E Velocity 0.96 m/s    PV .0 ms    PV Max Velocity 1.0 m/s    PV Peak Gradient 4 mmHg    RVIDd 3.9 cm    RV Basal Dimension 3.4 cm    RV Free Wall Peak S' 15 cm/s    TAPSE 1.9 1.7 cm    TR Max Velocity 2.79 m/s    TR Peak Gradient 31 mmHg    Fractional Shortening 2D 34 28 - 44 %    LV RWT Ratio 0.26     LV Mass 2D 127.0 88 - 224 g    MV E/A 1.03     E/E' Ratio (Averaged) 10.13     E/E' Lateral 9.60     E/E' Septal 10.67     LA/AO Root Ratio 1.03     AV Velocity Ratio 0.69     LVOT:AV VTI Index 0.67        I have personally reviewed imaging studies showing: Other Studies:  MRI BRAIN WO CONTRAST   Final Result   1. Single punctate focus of T2 hyperintensity within the right frontal lobe   white matter. Please see above. 2. Mild bilateral mastoid effusions. CTA HEAD NECK W WO CONTRAST   Final Result   Exceedingly Limited examination. No obvious intracranial arterial   occlusion. If stroke remains a consideration, consider MRI of the brain. CT HEAD WO CONTRAST   Final Result   1. No CT evidence of acute intracranial process. XR CHEST PORTABLE   Final Result   No evidence of an acute intrathoracic process.              Current Meds:  Current Facility-Administered Medications   Medication Dose Route Frequency    meclizine (ANTIVERT) tablet 12.5 mg  12.5 mg Oral TID PRN    nicotine (NICODERM CQ) 14 MG/24HR 1 patch  1 patch TransDERmal Daily PRN    acetaminophen (TYLENOL) tablet 650 mg  650 mg Oral Q4H PRN    Or    acetaminophen (TYLENOL) suppository 650 mg  650 mg Rectal Q4H PRN    ondansetron (ZOFRAN-ODT) disintegrating tablet 4 mg  4 mg Oral Q8H PRN    Or    ondansetron (ZOFRAN) injection 4 mg  4 mg IntraVENous Q6H PRN    polyethylene glycol (GLYCOLAX) packet 17 g  17 g Oral Daily PRN    bisacodyl (DULCOLAX) suppository 10 mg  10 mg Rectal Daily PRN    aspirin EC tablet 81 mg  81 mg Oral Daily    Or    aspirin suppository 300 mg  300 mg Rectal Daily    atorvastatin (LIPITOR) tablet 80 mg  80 mg Oral Nightly    labetalol (NORMODYNE;TRANDATE) injection 10 mg  10 mg IntraVENous Q10 Min PRN    enoxaparin Sodium (LOVENOX) injection 30 mg  30 mg SubCUTAneous BID    pantoprazole (PROTONIX) tablet 40 mg  40 mg Oral QAM AC    calcium carbonate (TUMS) chewable tablet 500 mg  500 mg Oral TID PRN    famotidine (PEPCID) tablet 20 mg  20 mg Oral BID PRN       Signed:  Darrick Singh MD    Part of this note may have been written by using a voice dictation software. The note has been proof read but may still contain some grammatical/other typographical errors.

## 2022-12-29 NOTE — CONSULTS
[facility]  [address] [facility]  [address], [address] [facility]            Pascual Adkins is a 63 y.o. male who presents on referral from the hospitalist service. 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 Previously well with the exception of hypertension and a history of a myocardial infarction 18 months ago with PCI on longstanding aspirin and statin therapy experienced the onset 2 days ago of dizziness with a degree of oscillopsia which has been persistent somewhat worse with moving head and worse with upright posture no orthostatic component. No associated motor weakness or instability but feels that his balance is clearly off when walking. Denies any hearing loss denies any ear pain denies tinnitus no prior episodes of vertigo  Symptoms are more persistent than would be expected in BPPV      No past medical history on file.     Past Surgical History:   Procedure Laterality Date    CARDIAC CATHETERIZATION  08/15/2021    TONSILLECTOMY  1972        Family History   Problem Relation Age of Onset    Cancer Paternal Grandfather     No Known Problems Mother     Heart Disease Father         CABG at age 52    No Known Problems Paternal Grandmother     Heart Disease Maternal Grandfather     Heart Disease Maternal Grandmother         Social History     Socioeconomic History    Marital status:    Occupational History    Occupation:    Tobacco Use    Smoking status: Never    Smokeless tobacco: Never   Vaping Use    Vaping Use: Never used   Substance and Sexual Activity    Alcohol use: Yes    Drug use: Never    Sexual activity: Never     Birth control/protection: None         Current Facility-Administered Medications   Medication Dose Route Frequency Provider Last Rate Last Admin    meclizine (ANTIVERT) tablet 12.5 mg  12.5 mg Oral TID PRN Constantin Calvert MD        nicotine (NICODERM CQ) 14 MG/24HR 1 patch  1 patch TransDERmal Daily PRN Constantin Calvert MD acetaminophen (TYLENOL) tablet 650 mg  650 mg Oral Q4H PRN Kelly Tracey MD   650 mg at 12/28/22 1354    Or    acetaminophen (TYLENOL) suppository 650 mg  650 mg Rectal Q4H PRN Kelly Tracey MD        ondansetron (ZOFRAN-ODT) disintegrating tablet 4 mg  4 mg Oral Q8H PRN Kelly Tracey MD        Or    ondansetron (ZOFRAN) injection 4 mg  4 mg IntraVENous Q6H PRN Kelly Tracey MD        polyethylene glycol (GLYCOLAX) packet 17 g  17 g Oral Daily PRN Kelly Tracey MD        bisacodyl (DULCOLAX) suppository 10 mg  10 mg Rectal Daily PRN Kelly Tracey MD        aspirin EC tablet 81 mg  81 mg Oral Daily Kelly Tracey MD   81 mg at 12/29/22 0831    Or    aspirin suppository 300 mg  300 mg Rectal Daily Kelly Tracey MD        atorvastatin (LIPITOR) tablet 80 mg  80 mg Oral Nightly Kelly Tracey MD   80 mg at 12/28/22 2031    labetalol (NORMODYNE;TRANDATE) injection 10 mg  10 mg IntraVENous Q10 Min PRN Kelly Tracey MD        enoxaparin Sodium (LOVENOX) injection 30 mg  30 mg SubCUTAneous BID Kelly Tracey MD   30 mg at 12/29/22 0831    pantoprazole (PROTONIX) tablet 40 mg  40 mg Oral QAM AC Kelly Tracey MD   40 mg at 12/29/22 0538    calcium carbonate (TUMS) chewable tablet 500 mg  500 mg Oral TID PRN Kelly Tracey MD        famotidine (PEPCID) tablet 20 mg  20 mg Oral BID PRN Kelly Tracey MD   20 mg at 12/28/22 1635        No Known Allergies    Review of Systems  Review of systems  General reports normal energy. Sleep wake cycle appetite  ENT no sinus congestion no epistaxis no unilateral hearing loss no swallowing difficulty no recent symptoms to suggest viral illness  Pulmonary-denies shortness of breath, no orthopnea, no wheezing, no productive sputum no hemoptysis  Cardiac denies chest pain palpitations peripheral edema  GI weight is stable appetite steady no constipation diarrhea abdominal pain  Joints no inflammation no hip pain or shoulder pain no inflammation.   No new onset back pain  Skin no rash or ecchymosis  Neuro no syncope vertigo diplopia dysarthria dysphagia difficulty with balance or coordination unilateral weakness   no nocturia. Urinary control is normal.    Psychiatric no mood disorder no chanel no depression no outbursts or belligerent behavior    BP (!) 147/98   Pulse 76   Temp 97.5 °F (36.4 °C) (Oral)   Resp 12   Ht 6' (1.829 m)   Wt 270 lb (122.5 kg)   SpO2 94%   BMI 36.62 kg/m²     Neurologic Exam  The patient is alert cooperative and oriented. No evident skin lesions no evidence of excessive bruising no trauma  Patient looks well-hydrated. Does not appear chronically ill. Cranial nerve examination normal visual fields. Normal random eye movements. No ptosis. No lid lag there is some nystagmus on left lateral gaze which is persistent  Face moves strongly symmetrically and equally  Speech is normal  Motor  Upper extremities  Normal bulk strength tone and symmetric arm swing  Lower extremities  Normal bulk strength tone  Deep tendon reflexes 1+ and symmetric at biceps brachioradialis and triceps knees and ankles. Stable in Romberg's position    Fine motor coordination is normal in the hands bilaterally    Peripheral sensation light touch normal  There are no carotid bruits  Vital signs are reviewed      Most recent MRI   Results for orders placed during the hospital encounter of 12/28/22    MRI BRAIN WO CONTRAST    Narrative  MRI brain without contrast    History: Dizziness, nystagmus    Imaging sequences: Multiplanar multisequence imaging was performed on a 1.5  Tonya magnet. Comparison: None. Correlation is made to the CT scan of the brain and CTA  performed on the same day. Findings: The ventricles are normal in size and configuration. There are no  extra-axial fluid collections. Normal flow voids are present within all of the  major intracranial vessels. No evidence of intraparenchymal hemorrhage or mass  effect is identified.   There are no areas of restricted diffusion to suggest an  acute or subacute infarction.    There is a single punctate focus of T2 prolongation within the right frontal  lobe white matter. These is a nonspecific finding but has been reported to occur  in \"normal\" subjects as well as those with migraine headaches. Chronic small  vessel ischemic changes could also have this appearance.    There are mild bilateral mastoid effusions, left greater than right.    Impression  1. Single punctate focus of T2 hyperintensity within the right frontal lobe  white matter. Please see above.  2. Mild bilateral mastoid effusions.     MRI reviewed on the PACS system and it actually represents excellent neural anatomy for an individual age 62 with this vascular history.  There is really minuscule evidence of microvascular disease.  This is encouraging  Most recent MRA   No results found for this or any previous visit.        Most recent CTA  Results for orders placed during the hospital encounter of 12/28/22    CTA HEAD NECK W WO CONTRAST    Narrative  Title:  CT arteriogram of the neck and head.    Indication: Dizziness, horizontal nystagmus.  Blurred vision.    Technique: Axial images of the neck and head were obtained after the uneventful  administration of intravenous iodinated contrast media. Contrast was used to  best identify the arterial structures.  Images were reviewed on a separate, free  standing, three-dimensional workstation as per the referring physicians request.      All stenosis percentages derived by comparing the narrowest segment with the  distal Internal Carotid Artery luminal diameter, as described in the North  American Symptomatic Carotid Endarterectomy Trial (NASCET) criteria.    All CT scans at this facility are performed using dose reduction/dose modulation  techniques, as appropriate the performed exam, including the following:  Automated Exposure Control; Adjustment of the mA and/or kV according to patient  size (this includes techniques or standardized  protocols for targeted exams  where dose is matched to indication/reason for exam); and Use of Iterative  Reconstruction Technique. Comparison: Head CT same date. Findings: Poor arterial contrast enhancement degrades the study. Lungs:  Clear. Bones:  No destructive lesion. Paranasal sinuses:  Clear. Brain:  No mass effect. Soft tissues:  Unremarkable. Superior sagittal sinus:  Patent. Right transverse sinus:  Patent. Left transverse sinus:  Patent. Aortic arch:  Mild atherosclerotic disease, patent. Right brachiocephalic artery:  Mild atherosclerotic disease, probably patent. Right subclavian artery:  Probably patent. Left subclavian artery:  Probably patent. Right common carotid artery: Tortuous, probably patent. Right external carotid artery:  Probably patent. Cervical Right internal carotid artery:  Scattered calcified plaques, minimal  narrowing, probably patent. Left common carotid artery: Probably patent. Left external carotid artery:  Probably patent. Cervical Left internal carotid artery:  Scattered calcified plaques, minimal  narrowing, probably patent. Right vertebral artery:  Extremely poor contrast enhancement, probably patent. Left vertebral artery:  Extremely poor contrast enhancement, probably patent. Basilar artery:  Patent. Extremely poor intracranial arterial enhancement. Extensive venous  contamination. Intracranial right internal carotid artery:  Grossly patent. Right middle cerebral artery:  Grossly patent. Right anterior cerebral artery:  Grossly patent. Anterior communicating artery: Not visualized. Left anterior cerebral artery:  Grossly patent. Left middle cerebral artery:  Grossly patent. Intracranial left internal carotid artery:  Grossly patent. Right posterior communicating artery: Not visualized. Left posterior communicating artery:  Not visualized. Right posterior cerebral artery:  Grossly patent.   Left posterior cerebral artery: Grossly patent.    Impression  Exceedingly Limited examination.  No obvious intracranial arterial  occlusion.  If stroke remains a consideration, consider MRI of the brain.       Most recent Echo  Results for orders placed in visit on 06/27/22    Transthoracic echocardiogram (TTE) complete with contrast, bubble, strain, and 3D PRN    Interpretation Summary    Left Ventricle: Normal left ventricular systolic function. EF by 2D Simpsons Biplane is 67%. Left ventricle size is normal. Normal wall thickness. Normal wall motion. Normal diastolic function.    Aortic Valve: Tricuspid valve. Mild sclerosis of the aortic valve cusp.    Technical qualifiers: Color flow Doppler was performed and pulse wave and/or continuous wave Doppler was performed.         Most recent lipid panels  Lab Results   Component Value Date/Time    CHOL 131 12/28/2022 03:58 PM    HDL 42 12/28/2022 03:58 PM    VLDL 14 04/25/2022 10:24 AM       Most recent Hgb A1C  No results found for: HBA1C, [id number]      Assessment/Plan:    Degree of suspicion for an ischemic cerebrovascular event.  Persists despite the negative MRI.  Incidence of ischemic events in the posterior fossa with negative diffusion is 6.7%  Start Brilinta 180 mg loading dose followed by 90 mg daily and ASA 81 mg daily discussed choice of Brilinta versus Plavix and he would prefer to utilize Brilinta as he has prior experience with it and notes that he tolerates itwell cost is not an issue  Initiate high intensity statin   Neurochecks Q4H  CTA of head and neck   Bedside swallow test   Labs: A1c, FLP, TSH, Cardiac enzymes, BMP, CBC  Telemetry and echocardiogram with bubble study  PT/OT/ST  DVT prophylaxis   BP management -permissive hypertension x24 hours  Smoking cessation if applicable   Diabetes education if applicable   ENT appointment as outpatient for thorough otologic evaluation  Does not require intensive neurologic follow-up          Gurjit Rapp MD

## 2022-12-29 NOTE — PROGRESS NOTES
ACUTE OCCUPATIONAL THERAPY GOALS:   (Developed with and agreed upon by patient and/or caregiver.)  1. Patient will complete lower body bathing and dressing with Mod I and adaptive equipment as   needed. 2. Patient will completed upper body bathing and dressing with Mod I and adaptive equipment as needed. 3. Patient will complete grooming standing at sink with Mod I and adaptive equipment as needed. 4. Patient will complete toileting with Mod I and adaptive equipment as needed. 5. Patient will tolerate 30 minutes of OT treatment with 1-2 rest breaks to increase activity tolerance for ADLs. 6. Patient will complete functional transfers with Mod I and adaptive equipment as needed. Timeframe: 7 visits       OCCUPATIONAL THERAPY Initial Assessment, Daily Note, and PM       OT Visit Days: 1  Acknowledge Orders  Time  OT Charge Capture  Rehab Caseload Tracker      Bertin Leo is a 63 y.o. male   PRIMARY DIAGNOSIS: Dizziness  Dizziness [R42]  Cerebrovascular accident (CVA) due to occlusion of posterior cerebral artery, unspecified blood vessel laterality ([facility] [address] [I63.539]       Reason for Referral: Generalized Muscle Weakness (M62.81)  Other abnormalities of gait and mobility (R26.89)  Inpatient: Payor: Duffy Kanner / Plan: Mandi Quirk / Product Type: *No Product type* /     ASSESSMENT:     REHAB RECOMMENDATIONS:   Recommendation to date pending progress:  Setting:  No further skilled therapy after discharge from hospital    Equipment:    To Be Determined     ASSESSMENT:  Mr. Susi Daly is a 62 y/o M presenting with dizziness. Pt seated in recliner on entry on RA, A/O x 4. Applicable PMHx: HTN, CAD s/p PCI. Pt reported intermittent dizziness. Pt denied SOB. Pt reports no fall(s) in past 6 months. PLOF (I), living with supportive spouse and working operating heavy machinery. DME present in home; none.  Pt currently CGA-SBA with UB ADLs, CGA with LB ADLs, SBA for toileting and CGA-SBA for functional t/fs and household mobility for ADLs with no AD. Rest breaks utilized as needed throughout session. Pt presents with deficits in ADLs, functional t/fs, household mobility for ADLs and activity tolerance compared to reported PLOF. Pt tolerated session well. Pt presents pleasant and motivated with good rehab potential. Pt would benefit from continued skilled OT services for 1-2 visits to ensure prevention of regression or until all stated goals met.       [address] [facility] [address] [id number] AM-PAC 6 Clicks Daily Activity Inpatient Short Form:    AM-PAC Daily Activity Inpatient   How much help for putting on and taking off regular lower body clothing?: A Little  How much help for Bathing?: A Little  How much help for Toileting?: None  How much help for putting on and taking off regular upper body clothing?: None  How much help for taking care of personal grooming?: A Little  How much help for eating meals?: None  AM-[facility] Inpatient Daily Activity Raw Score: 21  AM-PAC Inpatient ADL T-Scale Score : 44.27  ADL Inpatient CMS 0-100% Score: 32.79  ADL Inpatient CMS G-Code Modifier : CJ           SUBJECTIVE:     Mr. Coleman Tello states, \"It's not as bad now [after PT maneuvering]\"     Social/Functional Lives With: Spouse  Type of Home: House  Home Layout: Two level  Home Access: Level entry  Home Equipment:  (Denies DME use.)  ADL Assistance: Independent  Ambulation Assistance: Independent  Transfer Assistance: Independent  Active : Yes  Mode of Transportation: Car  Occupation: Self employed    OBJECTIVE:     LINES / Desi Merle / AIRWAY: Telemetry     RESTRICTIONS/PRECAUTIONS:       PAIN: VITALS / O2:   Pre Treatment:   Pain Assessment: None - Denies Pain      Post Treatment: None       Vitals          Oxygen            GROSS EVALUATION: INTACT IMPAIRED   (See Comments)   UE AROM [x] []   UE PROM [] []N/T   Strength [] LUE Strength  Gross LUE Strength: WFL  LUE Strength Comment: 4/5  RUE Strength  Gross RUE Strength: WFL  RUE Strength Comment: 4/5     Posture / Balance [] Sitting - Static: Good  Sitting - Dynamic: Good  Standing - Static: Good  Standing - Dynamic: Fair, +   Sensation []  WFL   Coordination []  BUE WFL     Tone []  N/A     Edema [] N/A   Activity Tolerance [] Patient limited by fatigue, Treatment limited secondary to medical complications (free text), Patient Tolerated treatment well     Hand Dominance R [x] L []      COGNITION/  PERCEPTION: INTACT IMPAIRED   (See Comments)   Orientation [x]     Vision [x]     Hearing [x]     Cognition  [x]     Perception []       MOBILITY: I Mod I S SBA CGA Min Mod Max Total  NT x2 Comments:   Bed Mobility    Rolling [] [] [] [] [] [] [] [] [] [x] []    Supine to Sit [] [] [] [] [] [] [] [] [] [x] []    Scooting [] [] [] [] [] [] [] [] [] [x] []    Sit to Supine [] [] [] [] [] [] [] [] [] [x] []    Transfers    Sit to Stand [] [] [] [] [x] [] [] [] [] [] []    Bed to Chair [] [] [] [] [x] [] [] [] [] [] []    Stand to Sit [] [] [] [] [x] [] [] [] [] [] []    Tub/Shower [] [] [] [] [] [] [] [] [] [x] []     Toilet [] [] [] [] [] [] [] [] [] [x] []      [] [] [] [] [] [] [] [] [] [] []    I=Independent, Mod I=Modified Independent, S=Supervision/Setup, SBA=Standby Assistance, CGA=Contact Guard Assistance, Min=Minimal Assistance, Mod=Moderate Assistance, Max=Maximal Assistance, Total=Total Assistance, NT=Not Tested    ACTIVITIES OF DAILY LIVING: I Mod I S SBA CGA Min Mod Max Total NT Comments   BASIC ADLs:              Upper Body Bathing  [] [] [] [x] [] [] [] [] [] [] BUE washing standing at sink no AD   Lower Body Bathing [] [] [] [] [x] [] [] [] [] []    Toileting [] [] [] [x] [x] [] [] [] [] []    Upper Body Dressing [] [] [] [x] [] [] [] [] [] []    Lower Body Dressing [] [] [] [] [x] [] [] [] [] [] Doff/don sock demonstration   Feeding [] [x] [] [] [] [] [] [] [] []    Grooming [] [] [] [] [x] [] [] [] [] [] Face washing, hair care standing at sink no AD   Personal Device Care [] [] [] [] [] [] [] [] [] [x]    Functional Mobility [] [] [] [x] [x] [] [] [] [] [] Household mobility for ADLs in room and on unit   I=Independent, Mod I=Modified Independent, S=Supervision/Setup, SBA=Standby Assistance, CGA=Contact Guard Assistance, Min=Minimal Assistance, Mod=Moderate Assistance, Max=Maximal Assistance, Total=Total Assistance, NT=Not Tested    PLAN:   [address] [place] [address] of Care: 2 times/week for duration of hospital stay or until stated goals are met, whichever comes first.    PROBLEM LIST:   (Skilled intervention is medically necessary to address:)  Decreased ADL/Functional Activities  Decreased Activity Tolerance  Decreased Balance  Decreased Gait Ability  Decreased Strength  Decreased Transfer Abilities  Vestibular Impairment   INTERVENTIONS PLANNED:  (Benefits and precautions of occupational therapy have been discussed with the patient.)  Self Care Training  Therapeutic Activity  Therapeutic Exercise/HEP  Neuromuscular Re-education  Manual Therapy  Education         TREATMENT:     EVALUATION: LOW COMPLEXITY: (Untimed Charge)    TREATMENT:   Co-Treatment PT/OT necessary due to patient's decreased overall endurance/tolerance levels, as well as need for high level skilled assistance to complete functional transfers/mobility and functional tasks  Therapeutic Activity (13 Minutes): Patient participated in therapeutic activities including bed mobility training, functional transfer training, functional mobility of household distances, bathroom mobility, sitting tolerance activity, and standing tolerance activity with no verbal cues in order to increase independence, increase activity tolerance, decrease assistance required, and prepare for functional activity. Self Care (10 minutes): Patient participated in upper body bathing, lower body dressing, and grooming ADLs in unsupported sitting and standing with no verbal cueing to increase independence, decrease assistance required, and increase activity tolerance.  Patient also participated in functional mobility, functional transfer, and energy conservation training to increase independence, decrease assistance required, and increase activity tolerance. TREATMENT GRID:  N/A    AFTER TREATMENT PRECAUTIONS: Bed/Chair Locked, Call light within reach, Chair, Needs within reach, and RN notified    INTERDISCIPLINARY COLLABORATION:  RN/ PCT, PT/ PTA, and OT/ [place]    EDUCATION:  Education Given To: Patient  Education Provided: Role of Therapy;Plan of Care;ADL Adaptive Strategies; Energy Conservation; Fall Prevention Strategies  Education Method: Verbal  Barriers to Learning: None  Education Outcome: Verbalized understanding    TOTAL TREATMENT DURATION AND TIME:  Time In: 1441  Time Out: [place]  Minutes: [facility], OT

## 2022-12-29 NOTE — CONSULTS
Physical Medicine & Rehabilitation Consult    Subjective:     Date of Consultation:  December 29, 2022  Referring Provider: Dr Genna Pimentel is a 63 y.o. male who is being evaluated at the request of the hospitalist  service for consideration for rehabilitation recommendations following admission for dizziness and blurred vision. HPI: The patient is a right-hand dominant, previously functionally independent 67yo male with a PMH of CAD s/p MI 18mos ago w/ PCI on ASA and a statin , HTN, who presented to the ED with a 2d onset of dizziness with oscillopsia. No associated nausea or weakness. His balance has been off. No vertigo. MRI showed a right frontal tiny infarct not c/w symptoms. He has also had several watery stools. He is on cdiff contact precautions  PT/OT pending. No swallowing deficits  ECHO pending. CTA with no LVO. Seen by neuro, Dr Sheri Layton, who recommended a hi intensity statin, and Brilinta as well as ASA 81mg daily. Dr Sheri Layton also felt that the pt would need an ENT appointment as outpatient for thorough otologic evaluation but  Does not require intensive neurologic follow-up      Principal Problem:    Dizziness  Active Problems:    Severe obesity (BMI 35.0-39. 9) with comorbidity ([address]    CAD (coronary artery disease)    HTN (hypertension)  Resolved Problems:    * No resolved hospital problems. *    No past medical history on file.    Past Surgical History:   Procedure Laterality Date    CARDIAC CATHETERIZATION  08/15/2021    TONSILLECTOMY  1972      Family History   Problem Relation Age of Onset    Cancer Paternal Grandfather     No Known Problems Mother     Heart Disease Father         CABG at age 52    No Known Problems Paternal Grandmother     Heart Disease Maternal Grandfather     Heart Disease Maternal Grandmother       Social History     Tobacco Use    Smoking status: Never    Smokeless tobacco: Never   Substance Use Topics    Alcohol use: Yes     Prior to Admission medications    Medication Sig Start Date End Date Taking? Authorizing Provider   clotrimazole (LOTRIMIN) 1 % cream Apply topically 2 times daily Apply topically 2 times daily. 10/21/22   Edson Brink MD   atorvastatin (LIPITOR) 80 MG tablet Take 1 tablet by mouth in the morning. 22   Katie Esposito MD   metoprolol succinate (TOPROL XL) 25 MG extended release tablet Take 1 tablet by mouth in the morning. 22   Katie Esposito MD   aspirin 81 MG chewable tablet Take 81 mg by mouth daily 8/15/21   Ar Automatic Reconciliation   nitroGLYCERIN (NITROSTAT) 0.4 MG SL tablet Place 0.4 mg under the tongue 21   Ar Automatic Reconciliation     No Known Allergies         Objective:     Vitals:  Blood pressure (!) 146/99, pulse 73, temperature 97.9 °F (36.6 °C), temperature source Oral, resp. rate 16, height 6' (1.829 m), weight 270 lb (122.5 kg), SpO2 94 %.   Temp (24hrs), Av.5 °F (36.4 °C), Min:97.3 °F (36.3 °C), Max:97.9 °F (36.6 °C)      Labs/Studies:  Recent Results (from the past 72 hour(s))   EKG 12 Lead    Collection Time: 22 11:54 PM   Result Value Ref Range    Ventricular Rate 76 BPM    Atrial Rate 76 BPM    P-R Interval 156 ms    QRS Duration 88 ms    Q-T Interval 378 ms    QTc Calculation (Bazett) 425 ms    P Axis 24 degrees    R Axis 50 degrees    T Axis 22 degrees    Diagnosis Normal sinus rhythm    CBC    Collection Time: 22 11:56 PM   Result Value Ref Range    WBC 10.4 4.3 - 11.1 K/uL    RBC 5.41 4.23 - 5.6 M/uL    Hemoglobin 16.5 13.6 - 17.2 g/dL    Hematocrit 48.3 41.1 - 50.3 %    MCV 89.3 82 - 102 FL    MCH 30.5 26.1 - 32.9 PG    MCHC 34.2 31.4 - 35.0 g/dL    RDW 12.0 11.9 - 14.6 %    Platelets 620 989 - 723 K/uL    MPV 9.6 9.4 - 12.3 FL    nRBC 0.00 0.0 - 0.2 K/uL   Hemoglobin A1c    Collection Time: 22 11:56 PM   Result Value Ref Range    Hemoglobin A1C 5.9 (H) 4.8 - 5.6 %    eAG 123 mg/dL   COVID-19, Rapid    Collection Time: 22 11:58 PM    Specimen: Nasopharyngeal   Result Value Ref Range    Source NASAL      SARS-CoV-2, Rapid Not detected NOTD     Rapid influenza A/B antigens    Collection Time: 12/27/22 11:58 PM    Specimen: Nasal Washing   Result Value Ref Range    Influenza A Ag Negative NEG      Influenza B Ag Negative NEG      Source Nasopharyngeal     Troponin    Collection Time: 12/27/22 11:58 PM   Result Value Ref Range    Troponin, High Sensitivity 3.4 0 - 14 pg/mL   Comprehensive Metabolic Panel    Collection Time: 12/27/22 11:58 PM   Result Value Ref Range    Sodium 140 133 - 143 mmol/L    Potassium 3.7 3.5 - 5.1 mmol/L    Chloride 105 101 - 110 mmol/L    CO2 27 21 - 32 mmol/L    Anion Gap 8 2 - 11 mmol/L    Glucose 176 (H) 65 - 100 mg/dL    BUN 14 8 - 23 MG/DL    Creatinine 1.20 0.8 - 1.5 MG/DL    Est, Glom Filt Rate >60 >60 ml/min/1.73m2    Calcium 9.4 8.3 - 10.4 MG/DL    Total Bilirubin 0.6 0.2 - 1.1 MG/DL    ALT 27 12 - 65 U/L    AST 11 (L) 15 - 37 U/L    Alk Phosphatase 86 50 - 136 U/L    Total Protein 7.9 6.3 - 8.2 g/dL    Albumin 3.9 3.2 - 4.6 g/dL    Globulin 4.0 2.8 - 4.5 g/dL    Albumin/Globulin Ratio 1.0 0.4 - 1.6     POCT Urinalysis no Micro    Collection Time: 12/28/22  1:53 AM   Result Value Ref Range    Specific Gravity, Urine, POC >1.030 (H) 1.001 - 1.023    pH, Urine, POC 5.5 5.0 - 9.0      Protein, Urine, POC Negative NEG mg/dL    Glucose, UA POC Negative NEG mg/dL    Ketones, Urine, POC Negative NEG mg/dL    Bilirubin, Urine, POC Negative NEG      Blood, UA POC Negative NEG      URINE UROBILINOGEN POC 1.0 0.2 - 1.0 EU/dL    Nitrate, Urine, POC Negative NEG      Leukocyte Est, UA POC Negative NEG      Performed by: Lisa Jean    Urinalysis    Collection Time: 12/28/22  2:32 AM   Result Value Ref Range    Color, UA YELLOW/STRAW      Appearance CLOUDY      Specific Gravity, UA 1.034 (H) 1.001 - 1.023      pH, Urine 5.5 5.0 - 9.0      Protein, UA TRACE (A) NEG mg/dL    Glucose, UA Negative mg/dL    Ketones, Urine TRACE (A) NEG mg/dL    Bilirubin Urine Negative NEG      Blood, Urine Negative NEG      Urobilinogen, Urine 1.0 0.2 - 1.0 EU/dL    Nitrite, Urine Negative NEG      Leukocyte Esterase, Urine Negative NEG      WBC, UA 0-4 U4 /hpf    RBC, UA 0-5 U5 /hpf    Epithelial Cells UA 0-5 U5 /hpf    BACTERIA, URINE Negative NEG /hpf    Casts 0-2 U2 /lpf   Troponin    Collection Time: 12/28/22  2:32 AM   Result Value Ref Range    Troponin, High Sensitivity 3.8 0 - 14 pg/mL   Lipid Panel    Collection Time: 12/28/22  3:58 PM   Result Value Ref Range    Cholesterol, Total 131 <200 MG/DL    Triglycerides 104 35 - 150 MG/DL    HDL 42 40 - 60 MG/DL    LDL Calculated 68.2 <100 MG/DL    VLDL Cholesterol Calculated 20.8 6.0 - 23.0 MG/DL    Chol/HDL Ratio 3.1     Basic Metabolic Panel w/ Reflex to MG    Collection Time: 12/29/22  7:17 AM   Result Value Ref Range    Sodium 141 133 - 143 mmol/L    Potassium 3.8 3.5 - 5.1 mmol/L    Chloride 108 101 - 110 mmol/L    CO2 27 21 - 32 mmol/L    Anion Gap 6 2 - 11 mmol/L    Glucose 107 (H) 65 - 100 mg/dL    BUN 14 8 - 23 MG/DL    Creatinine 1.00 0.8 - 1.5 MG/DL    Est, Glom Filt Rate >60 >60 ml/min/1.73m2    Calcium 8.7 8.3 - 10.4 MG/DL   CBC    Collection Time: 12/29/22  7:17 AM   Result Value Ref Range    WBC 9.6 4.3 - 11.1 K/uL    RBC 5.06 4.23 - 5.6 M/uL    Hemoglobin 15.3 13.6 - 17.2 g/dL    Hematocrit 45.8 41.1 - 50.3 %    MCV 90.5 82 - 102 FL    MCH 30.2 26.1 - 32.9 PG    MCHC 33.4 31.4 - 35.0 g/dL    RDW 12.4 11.9 - 14.6 %    Platelets 535 541 - 931 K/uL    MPV 9.5 9.4 - 12.3 FL    nRBC 0.00 0.0 - 0.2 K/uL   Transthoracic echocardiogram (TTE) complete with contrast, bubble, strain, and 3D PRN    Collection Time: 12/29/22  9:59 AM   Result Value Ref Range    LV EDV A2C 136 mL    LV EDV A4C 113 mL    LV ESV A2C 55 mL    LV ESV A4C 29 mL    IVSd 0.7 0.6 - 1.0 cm    LVIDd 5.3 4.2 - 5.9 cm    LVIDs 3.5 cm    LVOT Diameter 2.2 cm    LVOT Mean Gradient 2 mmHg    LVOT VTI 21.1 cm    LVOT Peak Velocity 1.1 m/s LVOT Peak Gradient 4 mmHg    LVPWd 0.7 0.6 - 1.0 cm    LV E' Lateral Velocity 10 cm/s    LV E' Septal Velocity 9 cm/s    LV Ejection Fraction A2C 60 %    LV Ejection Fraction A4C 74 %    EF BP 66 55 - 100 %    LVOT Area 3.8 cm2    LVOT SV 80.2 ml    LA Minor Axis 4.4 cm    LA Major Balfour 5.3 cm    LA Area 2C 16.2 cm2    LA Area 4C 16.2 cm2    LA Volume 2C 48 18 - 58 mL    LA Volume 4C 38 18 - 58 mL    LA Volume BP 47 18 - 58 mL    LA Diameter 3.2 cm    AV Mean Velocity 1.1 m/s    AV Mean Gradient 5 mmHg    AV VTI 31.3 cm    AV Peak Velocity 1.6 m/s    AV Peak Gradient 10 mmHg    AV Area by VTI 2.6 cm2    AV Area by Peak Velocity 2.5 cm2    Aortic Root 3.1 cm    Ascending Aorta 3.2 cm    MV E Wave Deceleration Time 211.0 ms    MV A Velocity 0.93 m/s    MV E Velocity 0.96 m/s    PV .0 ms    PV Max Velocity 1.0 m/s    PV Peak Gradient 4 mmHg    RVIDd 3.9 cm    RV Basal Dimension 3.4 cm    RV Free Wall Peak S' 15 cm/s    TAPSE 1.9 1.7 cm    TR Max Velocity 2.79 m/s    TR Peak Gradient 31 mmHg    Fractional Shortening 2D 34 28 - 44 %    LV RWT Ratio 0.26     LV Mass 2D 127.0 88 - 224 g    MV E/A 1.03     E/E' Ratio (Averaged) 10.13     E/E' Lateral 9.60     E/E' Septal 10.67     LA/AO Root Ratio 1.03     AV Velocity Ratio 0.69     LVOT:AV VTI Index 0.67        Impression / Assessment:     Principal Problem:    Dizziness  Active Problems:    Severe obesity (BMI 35.0-39. 9) with comorbidity ([address]    CAD (coronary artery disease)    HTN (hypertension)  Resolved Problems:    * No resolved hospital problems. *      Plan / Recommendations / Medical Decision Making:     Recommendations:   Continue acute rehab program  Coordination of rehab / medical care  Counseling of PM&R care issues management  Monitoring and management of medical conditions per plan of care / orders    -awaiting PT/OT evaluations.  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 I will f/u tomorrow    D/w Staff    Reviewed Labs / Fallon Diss / Records    Thank you very much for this referral. We appreciate the opportunity to participate in this patient's care. We will continue to follow.

## 2022-12-29 NOTE — THERAPY EVALUATION
ACUTE PHYSICAL THERAPY GOALS:   (Developed with and agreed upon by patient and/or caregiver.)    (1.) Eyad Sung will ambulate with INDEPENDENT for 500 feet with the least restrictive device within 7 treatment day(s). (2.) Eyad Sung will perform standing static and dynamic balance activities x 15 minutes with INDEPENDENT to improve safety within 7 treatment day(s). (3.) Eyad Sung will perform VORx1 exercises x 15 min for HEP with INDEPENDENCE to reduce vestibular symptoms and improve activity tolerance within 7 treatment day(s). PHYSICAL THERAPY Initial Assessment, Daily Note, and PM  (Link to Caseload Tracking: PT Visit Days : 1  Acknowledge Orders  Time In/Out  PT Charge Capture  Rehab Caseload Tracker    Eyad Sung is a 63 y.o. male   PRIMARY DIAGNOSIS: Dizziness  Dizziness [R42]  Cerebrovascular accident (CVA) due to occlusion of posterior cerebral artery, unspecified blood vessel laterality ([facility] [I63.539]       Reason for Referral: Dizziness and Giddiness (R42)  Inpatient: Payor: Best Parnell / Plan: Babak Sarmiento / Product Type: *No Product type* /     ASSESSMENT:     REHAB RECOMMENDATIONS:   Recommendation to date pending progress:  Setting:  Vestibular rehab    Equipment:    None     ASSESSMENT:  Mr. Raji Saldana  is a 63year old male who presents with vertigo, blurred vision, and nausea with ambulation and position changes. Symptoms resolve when he stops moving and have improved since admission. Pt denies any tinnitus or hearing changes. CTA of head shows no occlusion and pt reports no cervical pain/ROM restrictions; cleared for vestibular testing. Pt reports slight vertigo and L torsional nystagmus observed with bilateral Woodacre-Hallpike, L>R. PT performed Epley maneuver bilaterally with pt reporting decreased vertigo immediately following. Extensive education provided on vestibular pathologies and treatment options.  This date pt performs mobility including bed mobility, transfers, ambulation x250ft, and standing balance tasks with IND-SBA. Pt presents as functioning below his baseline, with deficits in mobility including balance and activity tolerance. Pt will benefit from skilled therapy services to address stated deficits to promote return to highest level of function, independence, and safety. Will continue to follow.      [address] [facility] [address] [address] AM-PAC 6 Clicks Basic Mobility Inpatient Short Form  AM-PAC Mobility Inpatient   How much difficulty turning over in bed?: None  How much difficulty sitting down on / standing up from a chair with arms?: None  How much difficulty moving from lying on back to sitting on side of bed?: None  How much help from another person moving to and from a bed to a chair?: None  How much help from another person needed to walk in hospital room?: A Little  How much help from another person for climbing 3-5 steps with a railing?: A Little  [facility] Inpatient Mobility Raw Score : 22  AM-PAC Inpatient T-Scale Score : 53.28  Mobility Inpatient CMS 0-100% Score: 20.91  Mobility Inpatient CMS G-Code Modifier : CJ    SUBJECTIVE:   Mr. Dash Lujan states, \"I've never felt anything like this\"     Social/Functional Lives With: Spouse  Type of Home: House  Home Layout: Two level  Home Access: Level entry  Home Equipment:  (Denies DME use.)  ADL Assistance: Independent  Ambulation Assistance: Independent  Transfer Assistance: Independent  Active : Yes  Mode of Transportation: Car  Occupation: Self employed    OBJECTIVE:     PAIN: Julisa Serene / O2: PRECAUTION / [place]ita Tigre / [place] Fus:   Pre Treatment: None         Post Treatment: None Vitals    VSS    Oxygen   Room air   Telemetry     RESTRICTIONS/PRECAUTIONS:                    GROSS EVALUATION: Intact Impaired (Comments):   AROM [x]     PROM [x]    Strength [x]     Balance [] Posture: Good  Sitting - Static: Good  Sitting - Dynamic: Good  Standing - Static: Good  Standing - Dynamic: Fair, +   Sensation [x]     Coordination [x]      Tone [x]     Edema [x]    Activity Tolerance [x]      []      COGNITION/  PERCEPTION: Intact Impaired (Comments):   Orientation [x]     Vision [x]     Hearing [x]     Cognition  [x]       MOBILITY: I Mod I S SBA CGA Min Mod Max Total  NT x2 Comments:   Bed Mobility    Rolling [x] [] [] [] [] [] [] [] [] [] []    Supine to Sit [x] [] [] [] [] [] [] [] [] [] []    Scooting [x] [] [] [] [] [] [] [] [] [] []    Sit to Supine [x] [] [] [] [] [] [] [] [] [] []    Transfers    Sit to Stand [] [] [] [x] [] [] [] [] [] [] []    Bed to Chair [] [] [] [] [] [] [] [] [] [x] []    Stand to Sit [] [] [] [x] [] [] [] [] [] [] []     [] [] [] [] [] [] [] [] [] [] []    I=Independent, Mod I=Modified Independent, S=Supervision, SBA=Standby Assistance, CGA=Contact Guard Assistance,   Min=Minimal Assistance, Mod=Moderate Assistance, Max=Maximal Assistance, Total=Total Assistance, NT=Not Tested    GAIT: I Mod I S SBA CGA Min Mod Max Total  NT x2 Comments:   Level of Assistance [] [] [] [x] [] [] [] [] [] [] [] Cuing for fixation on distant object with improvement in dysequilibrium reported   Distance 250  feet    DME None    Gait Quality Decreased raza , Decreased step length, Trunk sway increased, and Wide base of support    Weightbearing Status      Stairs      I=Independent, Mod I=Modified Independent, S=Supervision, SBA=Standby Assistance, CGA=Contact Guard Assistance,   Min=Minimal Assistance, Mod=Moderate Assistance, Max=Maximal Assistance, Total=Total Assistance, NT=Not Tested    PLAN:   FREQUENCY AND DURATION: 3 times/week for duration of hospital stay or until stated goals are met, whichever comes first.    THERAPY PROGNOSIS: Excellent    PROBLEM LIST:   (Skilled intervention is medically necessary to address:)  Decreased Balance  Decreased Gait Ability  Vestibular Impairment INTERVENTIONS PLANNED:   (Benefits and precautions of physical therapy have been discussed with the patient.)  Therapeutic Activity  Therapeutic Exercise/HEP  Neuromuscular Re-education  Gait Training  Education       TREATMENT:   EVALUATION: LOW COMPLEXITY: (Untimed Charge)    TREATMENT:   Neuromuscular Re-education (9 Minutes): Neuromuscular Re-education included Balance Training, Coordination training, Functional mobility with facilitation, Postural training, and Standing balance training to improve Balance, Coordination, Functional Mobility, Postural Control, and Proprioception.   Cannalith Repositioning Maneuver (untimed charge): BL Epley's Maneuver    TREATMENT GRID:  N/A    AFTER TREATMENT PRECAUTIONS: Bed/Chair Locked, Call light within reach, Chair, and Needs within reach    INTERDISCIPLINARY COLLABORATION:  RN/ PCT and OT/ [place]    EDUCATION: Education Given To: Patient  Education Provided: Role of Therapy;Plan of Care  Education Method: Verbal  Barriers to Learning: None  Education Outcome: Verbalized understanding    TIME IN/OUT:  Time In: 1441  Time Out: [place]  Minutes: Jay Jay Chen PT

## 2022-12-29 NOTE — PROGRESS NOTES
SPEECH LANGUAGE PATHOLOGY: DYSPHAGIA  Initial Assessment and Discharge    NAME: Marilyn Zheng  : 1960  MRN: [id number]    ADMISSION DATE: 2022  PRIMARY DIAGNOSIS: Dizziness  Dizziness [R42]  Cerebrovascular accident (CVA) due to occlusion of posterior cerebral artery, unspecified blood vessel laterality ([facility] [I63.539]    ICD-10: Treatment Diagnosis: R13.12 Dysphagia, Oropharyngeal Phase    RECOMMENDATIONS   Diet:  Diet Solids Recommendation: Regular  Liquid Consistency Recommendation: Thin    Medications: PO     Patient continues to require skilled intervention: Yes  D/C Recommendations:  (cognitive linguistic evaluation at next level of care)     ASSESSMENT    Dysphagia Diagnosis: Swallow function appears WFL    No acute needs. Patient reports he feels not quite as sharp, but attributes to overall just not feeling 100%. Discussed considering outpatient cognitive linguistic evaluation if persists. GENERAL    History of Present Injury/Illness: Mr. Dottie Akins  has no past medical history on file. Yuko Addison He also  has a past surgical history that includes Cardiac catheterization (08/15/2021) and Tonsillectomy (). Subjective: alert/oriented. Behavior/Cognition: Cooperative;Pleasant mood  Communication Observation: Functional  Follows Directions: Complex    Current Diet : Regular  Current Liquid Diet : Thin  Prior Dysphagia History: none  Patient Complaint: reports thought processing not as quick,which he attributes to overall dizziness/decreased intake/tired. Per chart, MRI negative for acute CVA. Respiratory Status: Room air              Pain:   Patient does not c/o pain         OBJECTIVE    Patient Positioning: Upright in bed   Oral Motor   Labial: No impairment  Oral Hygiene: Moist;Clean  Lingual: No impairment  Dentition: Adequate        Baseline Vocal Quality: Normal    Oropharyngeal Phase:   Patient consumed thin liquid via cup and straw, mixed, solid consistencies.  Appropriate oral prep and clearance with all textures. Appeared to demonstrate timely swallow and single swallows upon palpation likely indicating adequate pharyngeal clearance. No overt signs or symptoms of airway compromise observed with liquid or solid textures. Voice remained clear. PLAN    Duration/Frequency: No treatment indicated at this time    Dysphagia Outcome and Severity Scale (ZOE)  Dysphagia Outcome Severity Scale: Level 7: Normal in all situations  Interpretation of Tool: The Dysphagia Outcome and Severity Scale (ZOE) is a simple, easy-to-use, 7-point scale developed to systematically rate the functional severity of dysphagia based on objective assessment and make recommendations for diet level, independence level, and type of nutrition. Normal(7), Functional(6), Mild(5), Mild-Moderate(4), Moderate(3), Moderate-Severe(2), Severe(1)    Speech Therapy Prognosis  Prognosis: Excellent    Education: Patient, Family member  Patient Education: diet consistencies, aspiration precautions, consideration of cognitive linguistic evaluation at next level of care if concerns persist.  Patient Education Response: Verbalizes understanding, Demonstrated understanding    PRECAUTIONS/ALLERGIES: Patient has no known allergies. Safety Devices in place: Yes  Type of devices: Call light within reach; Left in bed        Therapy Time  SLP Individual Minutes  Time In: 5011  Time Out: 1100  Minutes: [address] [place], [place]  12/29/2022 11:29 AM

## 2022-12-30 ENCOUNTER — TELEPHONE (OUTPATIENT)
Dept: FAMILY MEDICINE CLINIC | Facility: CLINIC | Age: 62
End: 2022-12-30

## 2022-12-30 VITALS
RESPIRATION RATE: 12 BRPM | SYSTOLIC BLOOD PRESSURE: 133 MMHG | TEMPERATURE: 97.7 F | OXYGEN SATURATION: 95 % | BODY MASS INDEX: 36.57 KG/M2 | WEIGHT: 270 LBS | HEIGHT: 72 IN | HEART RATE: 67 BPM | DIASTOLIC BLOOD PRESSURE: 83 MMHG

## 2022-12-30 LAB
ANION GAP SERPL CALC-SCNC: 3 MMOL/L (ref 2–11)
BUN SERPL-MCNC: 16 MG/DL (ref 8–23)
CALCIUM SERPL-MCNC: 8.9 MG/DL (ref 8.3–10.4)
CHLORIDE SERPL-SCNC: 108 MMOL/L (ref 101–110)
CO2 SERPL-SCNC: 29 MMOL/L (ref 21–32)
CREAT SERPL-MCNC: 1.1 MG/DL (ref 0.8–1.5)
ERYTHROCYTE [DISTWIDTH] IN BLOOD BY AUTOMATED COUNT: 12.2 % (ref 11.9–14.6)
GLUCOSE SERPL-MCNC: 102 MG/DL (ref 65–100)
HCT VFR BLD AUTO: 45.2 % (ref 41.1–50.3)
HGB BLD-MCNC: 15.3 G/DL (ref 13.6–17.2)
MCH RBC QN AUTO: 30.2 PG (ref 26.1–32.9)
MCHC RBC AUTO-ENTMCNC: 33.8 G/DL (ref 31.4–35)
MCV RBC AUTO: 89.2 FL (ref 82–102)
NRBC # BLD: 0 K/UL (ref 0–0.2)
PLATELET # BLD AUTO: 249 K/UL (ref 150–450)
PMV BLD AUTO: 9.4 FL (ref 9.4–12.3)
POTASSIUM SERPL-SCNC: 3.6 MMOL/L (ref 3.5–5.1)
RBC # BLD AUTO: 5.07 M/UL (ref 4.23–5.6)
SODIUM SERPL-SCNC: 140 MMOL/L (ref 133–143)
WBC # BLD AUTO: 8 K/UL (ref 4.3–11.1)

## 2022-12-30 PROCEDURE — 6360000002 HC RX W HCPCS: Performed by: INTERNAL MEDICINE

## 2022-12-30 PROCEDURE — 6370000000 HC RX 637 (ALT 250 FOR IP): Performed by: INTERNAL MEDICINE

## 2022-12-30 PROCEDURE — 80048 BASIC METABOLIC PNL TOTAL CA: CPT

## 2022-12-30 PROCEDURE — 36415 COLL VENOUS BLD VENIPUNCTURE: CPT

## 2022-12-30 PROCEDURE — 85027 COMPLETE CBC AUTOMATED: CPT

## 2022-12-30 RX ORDER — NICOTINE 21 MG/24HR
1 PATCH, TRANSDERMAL 24 HOURS TRANSDERMAL DAILY PRN
Qty: 30 PATCH | Refills: 3 | Status: SHIPPED | OUTPATIENT
Start: 2022-12-30 | End: 2023-01-05 | Stop reason: ALTCHOICE

## 2022-12-30 RX ORDER — ASPIRIN 81 MG/1
81 TABLET, CHEWABLE ORAL DAILY
Qty: 30 TABLET | Refills: 3 | Status: SHIPPED | OUTPATIENT
Start: 2022-12-30

## 2022-12-30 RX ORDER — MECLIZINE HCL 12.5 MG/1
12.5 TABLET ORAL 3 TIMES DAILY PRN
Qty: 30 TABLET | Refills: 1 | Status: SHIPPED | OUTPATIENT
Start: 2022-12-30 | End: 2023-01-09

## 2022-12-30 RX ORDER — PANTOPRAZOLE SODIUM 40 MG/1
40 TABLET, DELAYED RELEASE ORAL
Qty: 30 TABLET | Refills: 3 | Status: SHIPPED | OUTPATIENT
Start: 2022-12-31

## 2022-12-30 RX ADMIN — ASPIRIN 81 MG: 81 TABLET ORAL at 09:35

## 2022-12-30 RX ADMIN — PANTOPRAZOLE SODIUM 40 MG: 40 TABLET, DELAYED RELEASE ORAL at 05:16

## 2022-12-30 RX ADMIN — ENOXAPARIN SODIUM 30 MG: 100 INJECTION SUBCUTANEOUS at 09:35

## 2022-12-30 NOTE — TELEPHONE ENCOUNTER
Patient Discharged 12/30 From [facility] DT seen for Dizziness, Please call 24/48 patient is waiting on a scheduled follow up appointment

## 2022-12-30 NOTE — CARE COORDINATION
Discharge note:  Vestibular PT therapy order faxed to [facility] fax [phone] (phone [phone]). They will call patient with OP PT appointment.

## 2022-12-30 NOTE — TELEPHONE ENCOUNTER
Patient Discharged 12/30 Rani MEDRANO patient seen for Dizziness, patient needing a 1 week follow up please let us know where to work patient in or if he could see Kirby Carr Please call patient with an appointment.

## 2022-12-30 NOTE — DISCHARGE SUMMARY
Hospitalist Discharge Summary   Admit Date:  2022  1:19 AM   DC Note date: 2022  Name:  Zohreh Michel   Age:  63 y.o. Sex:  male  :  1960   MRN:  [id number]   Room:  [facility]  PCP:  Ana Kennedy MD, MD    Presenting Complaint: Dizziness     Initial Admission Diagnosis: Dizziness [R42]  Cerebrovascular accident (CVA) due to occlusion of posterior cerebral artery, unspecified blood vessel laterality ([address] [I63.539]     Problem List for this Hospitalization (present on admission):    Principal Problem:    Dizziness  Active Problems:    Severe obesity (BMI 35.0-39. 9) with comorbidity ([address]    CAD (coronary artery disease)    HTN (hypertension)  Resolved Problems:    * No resolved hospital problems. [facility] Course:  Please refer to the admission H&P for details of presentation. In summary, Zohreh Michel is a 63 y.o. male with medical history significant for HTN, CAD s/p PCI who presented with dizziness that started 2 days prior to presentation. CT head and CTA were negative. However due to suspicion for posterior stroke, patient was admitted for further work-up. MRI brain with single punctate focus of T2 hyperintensity within the right frontal lobe white matter. Dizziness with horizontal nystagmus with concern for posterior stroke  CT and CTA without any abnormalities. MRI brain with single punctate focus of T2 hyperintensity within the right frontal lobe white matter  -Neurology recs appreciated. Started on brilinta  2D echo unremarkable with EF 60 to 65% and normal diastolic function. Continue high intensity statin and 81 mg aspirin with Brilinta on discharge. Outpatient vestibular rehab referral on discharge. Meclizine 12.5 mg p.o. 3 times daily as needed for dizziness     HTN // CAD s/p PCI  -Continue aspirin, Lipitor  -Continue with metoprolol     Disposition: Patient is medically cleared and hemodynamically stable for discharge today. Diet: ADULT DIET;  Regular; Low Fat/Low Chol/High Fiber/FORREST  Code Status: Full Code    Follow Ups:  Follow-up with PCP next 1 to 2 weeks and schedule. Time spent in patient discharge and coordination 36 minutes. Plan was discussed with patient and his wife. All questions answered. Patient was stable at time of discharge. Instructions given to call a physician or return if any concerns. Current Discharge Medication List        START taking these medications    Details   pantoprazole (PROTONIX) 40 MG tablet Take 1 tablet by mouth every morning (before breakfast)  Qty: 30 tablet, Refills: 3      nicotine (NICODERM CQ) 14 MG/24HR Place 1 patch onto the skin daily as needed (smoking cravings)  Qty: 30 patch, Refills: 3      ticagrelor (BRILINTA) 90 MG TABS tablet Take 1 tablet by mouth daily  Qty: 60 tablet, Refills: 3      meclizine (ANTIVERT) 12.5 MG tablet Take 1 tablet by mouth 3 times daily as needed for Dizziness  Qty: 30 tablet, Refills: 1           CONTINUE these medications which have CHANGED    Details   aspirin 81 MG chewable tablet Take 1 tablet by mouth daily  Qty: 30 tablet, Refills: 3           CONTINUE these medications which have NOT CHANGED    Details   clotrimazole (LOTRIMIN) 1 % cream Apply topically 2 times daily Apply topically 2 times daily. Qty: 28 g, Refills: 1    Associated Diagnoses: Tinea corporis      atorvastatin (LIPITOR) 80 MG tablet Take 1 tablet by mouth in the morning. Qty: 90 tablet, Refills: 3    Associated Diagnoses: Ischemic cardiomyopathy      metoprolol succinate (TOPROL XL) 25 MG extended release tablet Take 1 tablet by mouth in the morning.   Qty: 90 tablet, Refills: 3    Associated Diagnoses: Primary hypertension      nitroGLYCERIN (NITROSTAT) 0.4 MG SL tablet Place 0.4 mg under the tongue             Procedures done this admission:  * No surgery found *    Consults this admission:  IP CONSULT TO NEUROLOGY  IP CONSULT TO PHYSICAL MEDICINE REHAB  IP CONSULT TO CASE MANAGEMENT  IP CONSULT TO STROKE COORDINATOR    Echocardiogram results:  12/28/22    TRANSTHORACIC ECHOCARDIOGRAM (TTE) COMPLETE (CONTRAST/BUBBLE/3D PRN) 12/29/2022  2:28 PM (Final)    Interpretation Summary    Left Ventricle: Normal left ventricular systolic function with a visually estimated EF of 60 - 65%. Left ventricle size is normal. Normal wall thickness. Normal wall motion. Normal diastolic function. Interatrial Septum: Agitated saline study was negative with and without provocation. Technical qualifiers: Color flow Doppler was performed and pulse wave and/or continuous wave Doppler was performed. Contrast used: Definity. Signed by: Alison Ponce MD on 12/29/2022  2:28 PM      Diagnostic Imaging/Tests:   CTA HEAD NECK W WO CONTRAST    Result Date: 12/28/2022  Exceedingly Limited examination. No obvious intracranial arterial occlusion. If stroke remains a consideration, consider MRI of the brain. CT HEAD WO CONTRAST    Result Date: 12/28/2022  1. No CT evidence of acute intracranial process. XR CHEST PORTABLE    Result Date: 12/28/2022  No evidence of an acute intrathoracic process. MRI BRAIN WO CONTRAST    Result Date: 12/28/2022  1. Single punctate focus of T2 hyperintensity within the right frontal lobe white matter. Please see above. 2. Mild bilateral mastoid effusions.         Labs: Results:       BMP, Mg, Phos Recent Labs     12/27/22 2358 12/29/22  0717    141   K 3.7 3.8    108   CO2 27 27   ANIONGAP 8 6   BUN 14 14   CREATININE 1.20 1.00   LABGLOM >60 >60   CALCIUM 9.4 8.7   GLUCOSE 176* 107*      CBC Recent Labs     12/27/22 2356 12/29/22  0717   WBC 10.4 9.6   RBC 5.41 5.06   HGB 16.5 15.3   HCT 48.3 45.8   MCV 89.3 90.5   MCH 30.5 30.2   MCHC 34.2 33.4   RDW 12.0 12.4    257   MPV 9.6 9.5   NRBC 0.00 0.00      LFT Recent Labs     12/27/22 2358   BILITOT 0.6   ALKPHOS 86   AST 11*   ALT 27   PROT 7.9   LABALBU 3.9   GLOB 4.0      Cardiac  Lab Results   Component Value Date/Time    TROPHS 3.8 12/28/2022 02:32 AM    TROPHS 3.4 12/27/2022 11:58 PM    TROPHS 2,035.3 08/13/2021 05:55 AM      Coags No results found for: PROTIME, INR, APTT   A1c Lab Results   Component Value Date/Time    LABA1C 5.9 12/27/2022 11:56 PM    LABA1C 5.6 04/25/2022 10:24 AM     12/27/2022 11:56 PM     04/25/2022 10:24 AM      Lipids Lab Results   Component Value Date/Time    CHOL 131 12/28/2022 03:58 PM    LDLCALC 68.2 12/28/2022 03:58 PM    LABVLDL 20.8 12/28/2022 03:58 PM    HDL 42 12/28/2022 03:58 PM    CHOLHDLRATIO 3.1 12/28/2022 03:58 PM    TRIG 104 12/28/2022 03:58 PM      Thyroid  No results found for: Nenita Russell     Most Recent UA Lab Results   Component Value Date/Time    COLORU YELLOW/STRAW 12/28/2022 02:32 AM    APPEARANCE CLOUDY 12/28/2022 02:32 AM    SPECGRAV 1.034 12/28/2022 02:32 AM    LABPH 5.5 12/28/2022 02:32 AM    PROTEINU TRACE 12/28/2022 02:32 AM    GLUCOSEU Negative 12/28/2022 02:32 AM    GLUCOSEU Negative 12/28/2022 01:53 AM    KETUA TRACE 12/28/2022 02:32 AM    BILIRUBINUR Negative 12/28/2022 02:32 AM    BLOODU Negative 12/28/2022 02:32 AM    BLOODU Negative 12/28/2022 01:53 AM    UROBILINOGEN 1.0 12/28/2022 02:32 AM    NITRU Negative 12/28/2022 02:32 AM    LEUKOCYTESUR Negative 12/28/2022 02:32 AM    WBCUA 0-4 12/28/2022 02:32 AM    RBCUA 0-5 12/28/2022 02:32 AM    EPITHUA 0-5 12/28/2022 02:32 AM    BACTERIA Negative 12/28/2022 02:32 AM    LABCAST 0-2 12/28/2022 02:32 AM        No results for input(s): CULTURE in the last 720 hours.     All Labs from Last 24 Hrs:  Recent Results (from the past 24 hour(s))   Transthoracic echocardiogram (TTE) complete with contrast, bubble, strain, and 3D PRN    Collection Time: 12/29/22  9:59 AM   Result Value Ref Range    LV EDV A2C 136 mL    LV EDV A4C 113 mL    LV ESV A2C 55 mL    LV ESV A4C 29 mL    IVSd 0.7 0.6 - 1.0 cm    LVIDd 5.3 4.2 - 5.9 cm    LVIDs 3.5 cm    LVOT Diameter 2.2 cm    LVOT Mean Gradient 2 mmHg    LVOT VTI 21.1 cm    LVOT Peak Velocity 1.1 m/s    LVOT Peak Gradient 4 mmHg    LVPWd 0.7 0.6 - 1.0 cm    LV E' Lateral Velocity 10 cm/s    LV E' Septal Velocity 9 cm/s    LV Ejection Fraction A2C 60 %    LV Ejection Fraction A4C 74 %    EF BP 66 55 - 100 %    LVOT Area 3.8 cm2    LVOT SV 80.2 ml    LA Minor Axis 4.4 cm    LA Major [place] 5.3 cm    LA Area 2C 16.2 cm2    LA Area 4C 16.2 cm2    LA Volume 2C 48 18 - 58 mL    LA Volume 4C 38 18 - 58 mL    LA Volume BP 47 18 - 58 mL    LA Diameter 3.2 cm    AV Mean Velocity 1.1 m/s    AV Mean Gradient 5 mmHg    AV VTI 31.3 cm    AV Peak Velocity 1.6 m/s    AV Peak Gradient 10 mmHg    AV Area by VTI 2.6 cm2    AV Area by Peak Velocity 2.5 cm2    Aortic Root 3.1 cm    Ascending Aorta 3.2 cm    MV E Wave Deceleration Time 211.0 ms    MV A Velocity 0.93 m/s    MV E Velocity 0.96 m/s    PV .0 ms    PV Max Velocity 1.0 m/s    PV Peak Gradient 4 mmHg    RVIDd 3.9 cm    RV Basal Dimension 3.4 cm    RV Free Wall Peak S' 15 cm/s    TAPSE 1.9 1.7 cm    TR Max Velocity 2.79 m/s    TR Peak Gradient 31 mmHg    Fractional Shortening 2D 34 28 - 44 %    LV RWT Ratio 0.26     LV Mass 2D 127.0 88 - 224 g    MV E/A 1.03     E/E' Ratio (Averaged) 10.13     E/E' Lateral 9.60     E/E' Septal 10.67     LA/AO Root Ratio 1.03     AV Velocity Ratio 0.69     LVOT:AV VTI Index 0.67        No Known Allergies  Immunization History   Administered Date(s) Administered    Influenza, FLUCELVAX, (age 10 mo+), MDCK, PF, 0.5mL 10/21/2022       Recent Vital Data:  Patient Vitals for the past 24 hrs:   Temp Pulse Resp BP SpO2   12/30/22 0550 -- 65 -- -- --   12/30/22 0431 98.2 °F (36.8 °C) 69 18 (!) 147/96 93 %   12/29/22 2351 98.1 °F (36.7 °C) 63 18 (!) 169/92 94 %   12/29/22 2059 -- 85 -- -- --   12/29/22 1939 97.5 °F (36.4 °C) 79 18 (!) 142/94 95 %   12/29/22 1551 98.1 °F (36.7 °C) 83 18 (!) 151/86 95 %   12/29/22 1111 97.9 °F (36.6 °C) 73 16 (!) 146/99 94 %       Oxygen Therapy  SpO2: 93 %  Pulse via Oximetry: 82 beats per minute  Pulse Oximeter Device Mode: Intermittent  O2 Device: None (Room air)    Estimated body mass index is 36.62 kg/m² as calculated from the following:    Height as of this encounter: 6' (1.829 m). Weight as of this encounter: 270 lb (122.5 kg). No intake or output data in the 24 hours ending 12/30/22 0746      Physical Exam:    General:    Well nourished. No overt distress  Head:  Normocephalic, atraumatic  Eyes:  Sclerae appear normal.  Pupils equally round. HENT:  Nares appear normal, no drainage. Moist mucous membranes  Neck:  No restricted ROM. Trachea midline  CV:   RRR. No m/r/g. No JVD  Lungs:   CTAB. No wheezing, rhonchi, or rales. Respirations even, unlabored  Abdomen:   Soft, nontender, nondistended. Extremities: Warm and dry. No cyanosis or clubbing. No edema. Skin:     No rashes. Normal coloration  Neuro:  CN II-XII grossly intact. Psych:  Normal mood and affect. Signed:  Alexey Espinoza MD    Part of this note may have been written by using a voice dictation software. The note has been proof read but may still contain some grammatical/other typographical errors.

## 2023-01-03 NOTE — TELEPHONE ENCOUNTER
Care Transitions Initial Follow Up Call    Outreach made within 2 business days of discharge: Yes, called with in 2 business days (Office closed 2023)    Patient: Dania Restrepo Patient : 1960   MRN: [id number]  Reason for Admission: Dizziness, Cerebrovascular accident (CVA)  Discharge Date: 22       Spoke with: Mike Falcon    Discharge department/facility: . [address] Patient Contact:  Was patient able to fill all prescriptions: Yes  Was patient instructed to bring all medications to the follow-up visit: Yes  Is patient taking all medications as directed in the discharge summary? Yes  Does patient understand their discharge instructions: Yes  Does patient have questions or concerns that need addressed prior to 7-14 day follow up office visit: no    Scheduled appointment with PCP within 7-14 days    Waiting for response for appt time.    Follow Up  Future Appointments   Date Time Provider Denice Ponce   2023  8:00 AM Yordy Doe MD [facility] AMB   10/17/2023  8:45 AM DONALD MISCELLANEOUS [facility] AMB   10/24/2023  8:00 AM Jessica Spence MD [facility] AMB       Cherri Mendez

## 2023-01-04 ENCOUNTER — HOSPITAL ENCOUNTER (OUTPATIENT)
Dept: PHYSICAL THERAPY | Age: 63
Setting detail: RECURRING SERIES
Discharge: HOME | End: 2023-01-07
Payer: COMMERCIAL

## 2023-01-04 PROCEDURE — 97162 PT EVAL MOD COMPLEX 30 MIN: CPT

## 2023-01-04 ASSESSMENT — PAIN SCALES - GENERAL: PAINLEVEL_OUTOF10: 0

## 2023-01-04 NOTE — THERAPY EVALUATION
Marilyn Zheng  : 1960  Primary: [facility] [address]  Secondary:  [address] [address]  Phone: [phone]  Fax: [phone] Plan Frequency: 6 x per month  Plan of Care/Certification Expiration Date: 23    PT Visit Info: Total # of Visits Approved: 30  Total # of Visits to Date: 1  Progress Note Counter: 1    Visit Count:  1    OUTPATIENT PHYSICAL THERAPY:OP NOTE TYPE: Initial Assessment 2023               Episode  Appt Desk         Treatment Diagnosis:  Unsteadiness on feet (R26.81)  Dizziness and giddiness (R42)  Medical/Referring Diagnosis:  Dizziness [R42]  Referring Physician:  Malika Randle MD MD Orders:  PT Eval and Treat   Return MD Appt:  unknown  Date of Onset:  Onset Date: 22   Allergies:  Patient has no known allergies. Restrictions/Precautions:           Medications Last Reviewed:  2023     SUBJECTIVE   History of Injury/Illness (Reason for Referral):   63year old left handed marrield white male feeling dizzy 2022 and ER 2022 and out on the . Dizzy with walking but more like I am moving. Sounds like more imbalance. No spinning. At first really bad and making me nauseous. Patient Stated Goal(s): \"My goal is to get past this imbalance. [employer] grading. Own company. \"  Initial:     0/10 Post Session:     0/10  Past Medical History/Comorbidities:    Mr. Dottie Akins  has no past medical history on file. Mr. Dottie Akins  has a past surgical history that includes Cardiac catheterization (08/15/2021) and Tonsillectomy ().   Social History/Living Environment:   Lives With: Spouse  Type of Home: House  Home Layout: Two level  Home Access: Level entry  Home Equipment: -- (Denies DME use.)     Prior Level of Function/Work/Activity:   Occupation: Self employed  ADL Assistance: Independent  Ambulation Assistance: Independent  Transfer Assistance: Independent  Active : Yes  Mode of Transportation: Car           Learning:   Does the patient/guardian have any barriers to learning?: No barriers  Will there be a co-learner?: No  What is the preferred language of the patient/guardian?: English  Is an  required?: No  How does the patient/guardian prefer to learn new concepts?: Listening; Reading; Demonstration; Pictures/Videos     Fall Risk Scale: Chatterjee Total Score: 35  Chatterjee Fall Risk: Medium (25-44)         OBJECTIVE   Normal functional strength test  CTSIB eyes closed solid - mild to moderate AP sway. Eyes closed foam - moderate sway. Single leg stance 10 seconds each a little wobbly. Date:  1/4/2023 Date: Date: Date:   Activity/Exercise Parameters Parameters Parameters Parameters   Tracking:  horizontal good      Tracking:  Vertical ok      Tracking:  Diagonal (D1) ok      Tracking:  Diagonal (D2) ok      Saccades:  Horizontal ok      Saccades:  Vertical ok      Saccades:  Diagonal (D1) ok      Saccades:  Diagonal (D2) ok      VOR1: horizontal Very slight      VOR1:  vertical ok      VOR2:  horizontal  Slow and slight symptoms                 ASSESSMENT   Initial Assessment:  63year old s/p CVA with some residual dysequilibrium. No vertigo or dizziness per se more a feeling of being unsteady/on a boat. Gets a little more \"accelerated\" in the car. Not driving currently. CTSIB is abnormal and higher level vision is abnormal - VOR 2. Pt will benefit from skilled physical therapy to maximize strength, balance and gait ability to decrease risk for falls with possible injury or hospitalization. Problem List: (Impacting functional limitations): Body Structures, Functions, Activity Limitations Requiring Skilled Therapeutic Intervention: Decreased balance; Decreased vision/visual deficit;  Vestibular Impairment     Therapy Prognosis:   Therapy Prognosis: Good     Assessment Complexity:   High Complexity  PLAN   Effective Dates: 1/4/2023 TO Plan of Care/Certification Expiration Date: 04/04/23   Frequency/Duration: Plan Frequency: 6 x per month   Interventions Planned (Treatment may consist of any combination of the following):    Current Treatment Recommendations: Strengthening; ROM; Balance training; Functional mobility training; Gait training; Stair training; Neuromuscular re-education; Manual; Return to work related activity; Home exercise program; Safety education & training; Vestibular rehab; Therapeutic activities     GOALS: (Goals have been discussed and agreed upon with patient.)  Long Term Functional Goals: Time Frame: 8 visits  Pt will be independent with range of motion, strength and balance home exercise program.  Pt will have no more than 10% disability on the Dizziness Handicap Inventory indicating decreased dizziness and increased quality of life. Pt will increase Dynamic Gait Index to 23/24 indicating increase gait balance and decreased risk for falls. Outcome Measure: Tool Used: Dynamic Gait Index  Score:  Initial: 21/24 Most Recent: X/24 (Date: -- )   Interpretation of Score: Each section is scored on a 0-3 scale, 0 representing the patients inability to perform the task and 3 representing independence. The scores of each section are added together for a total score of 24. Any score below 19 indicates increased risk for falls. Tool Used: Dizziness Handicap Index  Score:  Initial: 56%  Most Recent:  (Date: -- )   Interpretation of Score: To each item, the following scores may be assigned: No = 0, Sometimes = 2, Yes = 4. Scores greater than 10 points should be referred to balance specialists for further evaluation. MEDICAL NECESSITY:   Patient is expected to demonstrate progress in strength, range of motion, balance, coordination, and functional technique to improve safety during all activities.   REASON FOR SERVICES/OTHER COMMENTS:  Patient continues to require skilled intervention due to imbalance and vestibular deficits. Total Duration:  Time In: 1430  Time Out: 1515    Regarding Jimi Kevin's therapy, I certify that the treatment plan above will be carried out by a therapist or under their direction.   Thank you for this referral,  Ra Brennan, PT     Referring Physician Signature: Karol Mosquera MD                    Post Session Pain  Charge Capture  PT Visit Info MD Guidelines  [place]

## 2023-01-05 ENCOUNTER — HOSPITAL ENCOUNTER (OUTPATIENT)
Dept: PHYSICAL THERAPY | Age: 63
Setting detail: RECURRING SERIES
Discharge: HOME | End: 2023-01-08
Payer: COMMERCIAL

## 2023-01-05 ENCOUNTER — OFFICE VISIT (OUTPATIENT)
Dept: FAMILY MEDICINE CLINIC | Facility: CLINIC | Age: 63
End: 2023-01-05

## 2023-01-05 VITALS
DIASTOLIC BLOOD PRESSURE: 82 MMHG | SYSTOLIC BLOOD PRESSURE: 146 MMHG | OXYGEN SATURATION: 98 % | RESPIRATION RATE: 18 BRPM | BODY MASS INDEX: 34.93 KG/M2 | WEIGHT: 257.9 LBS | TEMPERATURE: 99.9 F | HEIGHT: 72 IN | HEART RATE: 84 BPM

## 2023-01-05 DIAGNOSIS — I10 PRIMARY HYPERTENSION: ICD-10-CM

## 2023-01-05 DIAGNOSIS — E66.01 SEVERE OBESITY (BMI 35.0-39.9) WITH COMORBIDITY (HCC): ICD-10-CM

## 2023-01-05 DIAGNOSIS — I25.10 CORONARY ARTERY DISEASE INVOLVING NATIVE CORONARY ARTERY OF NATIVE HEART WITHOUT ANGINA PECTORIS: ICD-10-CM

## 2023-01-05 DIAGNOSIS — Z86.73 H/O: CVA (CEREBROVASCULAR ACCIDENT): Primary | ICD-10-CM

## 2023-01-05 PROCEDURE — 97110 THERAPEUTIC EXERCISES: CPT

## 2023-01-05 ASSESSMENT — ENCOUNTER SYMPTOMS
GASTROINTESTINAL NEGATIVE: 1
RESPIRATORY NEGATIVE: 1

## 2023-01-05 ASSESSMENT — PAIN SCALES - GENERAL: PAINLEVEL_OUTOF10: 0

## 2023-01-05 ASSESSMENT — PATIENT HEALTH QUESTIONNAIRE - PHQ9
SUM OF ALL RESPONSES TO PHQ QUESTIONS 1-9: 0
SUM OF ALL RESPONSES TO PHQ9 QUESTIONS 1 & 2: 0
2. FEELING DOWN, DEPRESSED OR HOPELESS: 0
SUM OF ALL RESPONSES TO PHQ QUESTIONS 1-9: 0
1. LITTLE INTEREST OR PLEASURE IN DOING THINGS: 0
SUM OF ALL RESPONSES TO PHQ QUESTIONS 1-9: 0
SUM OF ALL RESPONSES TO PHQ QUESTIONS 1-9: 0

## 2023-01-05 NOTE — PROGRESS NOTES
Zohreh Michel (:  1960) is a 63 y.o. male,Established patient, here for evaluation of the following chief complaint(s):  Follow-Up from Hospital (CVA )         ASSESSMENT/PLAN:  1. H/O: CVA (cerebrovascular accident)  2. Severe obesity (BMI 35.0-39. 9) with comorbidity ([address]  3. Primary hypertension  4. Coronary artery disease involving native coronary artery of native heart without angina pectoris      Watch blood pressure. If much above 150/90 consistently, let us know. Continue Brilinta, atorvastatin, metoprolol. Follow-up in 4 weeks with cardiology. Continue physical therapy. Subjective   SUBJECTIVE/OBJECTIVE:  HPI  62 yo male with underlying CAD (stents placed in ), hyperlipidemia, hypertension. 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 Here for hosp folloup: CVA:  DC summary: \"In summary, Zohreh Michel is a 63 y.o. male with medical history significant for HTN, CAD s/p PCI who presented with dizziness that started 2 days prior to presentation. CT head and CTA were negative. However due to suspicion for posterior stroke, patient was admitted for further work-up. MRI brain with single punctate focus of T2 hyperintensity within the right frontal lobe white matter. Dizziness with horizontal nystagmus with concern for posterior stroke  CT and CTA without any abnormalities. MRI brain with single punctate focus of T2 hyperintensity within the right frontal lobe white matter  -Neurology recs appreciated. Started on brilinta  2D echo unremarkable with EF 60 to 65% and normal diastolic function. Continue high intensity statin and 81 mg aspirin with Brilinta on discharge. Outpatient vestibular rehab referral on discharge. Meclizine 12.5 mg p.o. 3 times daily as needed for dizziness     HTN // CAD s/p PCI  -Continue aspirin, Lipitor  -Continue with metoprolol \"    Is now at home; feeling much better. He is doing well today.   Has started physical therapy. He has followup with Dr. Eugenie Gerardo (cardiology) in 4 weeks. Review of Systems   Constitutional:  Negative for activity change, diaphoresis, fatigue and fever. Respiratory: Negative. Cardiovascular: Negative. Negative for chest pain, palpitations and leg swelling. Gastrointestinal: Negative. Skin: Negative. Neurological:  Negative for headaches. Objective   Physical Exam  Vitals and nursing note reviewed. Constitutional:       General: He is not in acute distress. Appearance: He is not ill-appearing or toxic-appearing. Cardiovascular:      Rate and Rhythm: Normal rate and regular rhythm. Pulmonary:      Effort: Pulmonary effort is normal.      Breath sounds: Normal breath sounds. Abdominal:      General: Abdomen is flat. Skin:     Capillary Refill: Capillary refill takes less than 2 seconds. Neurological:      General: No focal deficit present. Mental Status: He is alert and oriented to person, place, and time. Psychiatric:         Mood and Affect: Mood normal.         Behavior: Behavior normal.         Thought Content: Thought content normal.         Judgment: Judgment normal.        Vitals:    01/05/23 1306   BP: (!) 146/82   Pulse:    Resp:    Temp:    SpO2:        On this date 1/5/2023 I have spent 30 minutes reviewing previous notes, test results and face to face with the patient discussing the diagnosis and importance of compliance with the treatment plan as well as documenting on the day of the visit. An electronic signature was used to authenticate this note.     --Rober Reed MD, MD

## 2023-01-05 NOTE — PROGRESS NOTES
Michaellepj Mehta  : 1960  Primary: [facility] ([place] [employer])  Secondary:  [address] @ [address] [address]  Phone: [phone]  Fax: [phone] Plan Frequency: 6 x per month    Plan of Care/Certification Expiration Date: 23      PT Visit Info:  Plan Frequency: 6 x per month  Plan of Care/Certification Expiration Date: 23  Total # of Visits Approved: 30  Total # of Visits to Date: 2  Progress Note Counter: 2     Visit Count:  2   OUTPATIENT PHYSICAL THERAPY:OP NOTE TYPE: Treatment Note 2023       Episode  }Appt Desk             Treatment Diagnosis:  Unsteadiness on feet (R26.81)  Dizziness and giddiness (R42)   Medical/Referring Diagnosis:  Dizziness [R42]  Referring Physician:  Geoffrey Lan MD MD Orders:  PT Eval and Treat   Date of Onset:  Onset Date: 22     Allergies:   Patient has no known allergies. Restrictions/Precautions:     Interventions Planned (Treatment may consist of any combination of the following):    Current Treatment Recommendations: Strengthening; ROM; Balance training; Functional mobility training; Gait training; Stair training; Neuromuscular re-education; Manual; Return to work related activity; Home exercise program; Safety education & training; Vestibular rehab; Therapeutic activities     Subjective Comments:  So I already feel better than yesterday and I feel more confident already. Initial:}    0/10Post Session:       0/10  Medications Last Reviewed:  2023  Updated Objective Findings:  None Today  Treatment   THERAPEUTIC ACTIVITY: ( 45 minutes): Therapeutic activities per grid below to improve mobility, balance, coordination, and gait ability . Required minimal visual, manual, and tactile cues to  improve vestibular ability .      Date:  2023 Date:   Activity/Exercise Parameters Parameters   Tracking ball in front standing horizoantal and vertical X10 each position no problem    Lore Armstrong \"toss\" horizontal and vertical   X10 each position  No problem    VOR 2 head and ball opposite tracking in standing X10 each position   No problem    Walking with tracking ball horizontal  4 x30'    Walking with trakcing vertical ball tos 4 x 30'         Walking in [place]" sutton Casual scanning 2 x 30'  Pointing to stickers 2 x 30'  Finding colors x 3 2 x 30'  Finding shapes on stickers 3 x 2 x 30'  Finding shapes calling colors 3 x 2 x 30'    Standing facing wall and moving stickers from high diagonal to low up and down down to up either direction. X3 sticker each way x 2    Standing foam - upper trunk rotation x10    Standing foam - forward back bend x10         EDUCATION:  Instructed in above exercises and home program.  HEP:  Written HEP given to patient. Treatment/Session Summary:    Treatment Assessment:  Pt tolerating high level vestibular and movement with occasional path deviations but otherwise doing much better.   Communication/Consultation:  None today  Equipment provided today:  None  Recommendations/Intent for next treatment session: Next visit will focus on big steps and balancing on foam.    Total Treatment Billable Duration:  45 minutes  Time In: 1345  Time Out: [address], PT       Charge Capture  }Post Session Pain  PT Visit [address] [facility]  MD [address]    Future Appointments   Date Time Provider Denice Ponce   1/11/2023  3:15 PM Macey Booth PT [facility]   1/18/2023  3:15 PM Macey Booth PT [facility]   2/2/2023  8:00 AM MD OMAR Valencia [facility] AMB   10/17/2023  8:45 AM DONALD MISCELLANEOUS DONALD [facility] AMB   10/24/2023  8:00 AM MD DONALD Katz [facility] AMB

## 2023-01-09 ENCOUNTER — TELEPHONE (OUTPATIENT)
Dept: FAMILY MEDICINE CLINIC | Facility: CLINIC | Age: 63
End: 2023-01-09

## 2023-01-09 ENCOUNTER — APPOINTMENT (OUTPATIENT)
Dept: PHYSICAL THERAPY | Age: 63
End: 2023-01-09
Payer: COMMERCIAL

## 2023-01-09 NOTE — TELEPHONE ENCOUNTER
Spoke with patients wife and sent via [employer] [facility] doctors that are taking on new patients.

## 2023-01-09 NOTE — TELEPHONE ENCOUNTER
----- Message from Ev Marks sent at 1/9/2023  9:36 AM EST -----  Subject: Message to Provider    QUESTIONS  Information for Provider? Patient received email about Dr. Krysta aguilar   and he wants to establish with Tiffany Devine.   ---------------------------------------------------------------------------  --------------  [id number] [employer]  [phone]; OK to leave message on voicemail  ---------------------------------------------------------------------------  --------------  SCRIPT ANSWERS  Relationship to Patient? Other  Representative Name? Jessica Peraza  Is the Representative on the appropriate HIPAA document in Epic?  Yes

## 2023-01-09 NOTE — TELEPHONE ENCOUNTER
Patient received email about Dr. Katey Jay leaving   and he wants to establish with Federico Harrington.

## 2023-01-11 ENCOUNTER — HOSPITAL ENCOUNTER (OUTPATIENT)
Dept: PHYSICAL THERAPY | Age: 63
Setting detail: RECURRING SERIES
Discharge: HOME | End: 2023-01-14
Payer: COMMERCIAL

## 2023-01-11 PROCEDURE — 97530 THERAPEUTIC ACTIVITIES: CPT

## 2023-01-11 ASSESSMENT — PAIN SCALES - GENERAL: PAINLEVEL_OUTOF10: 0

## 2023-01-11 NOTE — PROGRESS NOTES
Nitza Hart  : 1960  Primary: [facility] ([facility]  Secondary:  [address] [address]  Phone: [phone]  Fax: [phone] Plan Frequency: 6 x per month    Plan of Care/Certification Expiration Date: 23      PT Visit Info:  Plan Frequency: 6 x per month  Plan of Care/Certification Expiration Date: 23  Total # of Visits Approved: 30  Total # of Visits to Date: 3  Progress Note Counter: 3     Visit Count:  3   OUTPATIENT PHYSICAL THERAPY:OP NOTE TYPE: Treatment Note 2023       Episode  }Appt Desk             Treatment Diagnosis:  Unsteadiness on feet (R26.81)  Dizziness and giddiness (R42)   Medical/Referring Diagnosis:  Dizziness [R42]  Referring Physician:  Joi Norris MD MD Orders:  PT Eval and Treat   Date of Onset:  Onset Date: 22     Allergies:   Patient has no known allergies. Restrictions/Precautions:     Interventions Planned (Treatment may consist of any combination of the following):    Current Treatment Recommendations: Strengthening; ROM; Balance training; Functional mobility training; Gait training; Stair training; Neuromuscular re-education; Manual; Return to work related activity; Home exercise program; Safety education & training; Vestibular rehab; Therapeutic activities     Subjective Comments:  Patient reports he went back to work yesterday. Thinks he is improving. Initial:}    0/10Post Session:       0/10  Medications Last Reviewed:  2023  Updated Objective Findings:  None Today  Treatment   THERAPEUTIC ACTIVITY: ( 39 minutes): Therapeutic activities per grid below to improve mobility, balance, coordination, and gait ability . Required minimal visual, manual, and tactile cues to  improve vestibular ability .      Date:  2023 Date:   Activity/Exercise Parameters Parameters   Tracking ball in front standing horizoantal and vertical X10 each position no problem 2 x 10 Guerline Montoya \"toss\" horizontal and vertical   X10 each position  No problem 2 x 10    VOR 2 head and ball opposite tracking in standing X10 each position   No problem Against busy wall- slight jump with eyes   Walking with tracking ball horizontal  4 x30' 35 ft x 4    Walking with trakcing vertical ball tos 4 x 30' 35 ft x 4    Walking with tracking ball in circles - CW/CCW   In each hand   35 ft x 4    Walking - tracking diagonals  35 ft x 4 each direction   Walking in \"sticker\" sutton Casual scanning 2 x 30'  Pointing to stickers 2 x 30'  Finding colors x 3 2 x 30'  Finding shapes on stickers 3 x 2 x 30'  Finding shapes calling colors 3 x 2 x 30' Pointing out colors 2 x 30 ft  Pointing out shapes   2 x 30 ft  Finding shapes calling out color  2 x 30 ft     Standing facing wall and moving stickers from high diagonal to low up and down down to up either direction. X3 sticker each way x 2 Alphabet stickers in room   Calling out letters- had to search and touch 2 at a time   Standing foam - upper trunk rotation x10    Standing foam - forward back bend x10    High reach to each side on wall with good weight shift   2 x 10    EDUCATION:  Instructed in above exercises and home program.  HEP:  Written HEP given to patient. Treatment/Session Summary:    Treatment Assessment:  Added walking with tracking diagonals for HEP. HE reports he does them everyday. Did notice with diagonals and VOR 2 exercise today that his eyes would jump ahead especially L eye.   Communication/Consultation:  None today  Equipment provided today:  None  Recommendations/Intent for next treatment session: Next visit will focus on big steps and balancing on foam.    Total Treatment Billable Duration:  39 minutes  Time In: 1525 (patient 10 minutes late)  Time Out: [address] [facility]       Charge Capture  }Post Session Pain  PT Visit Info  [employer] Portal  MD Guidelines  Scanned Media  Benefits  [employer]    Future Appointments   Date Time Provider Denice Ponce   1/18/2023  3:15 PM Tavo Estevez PT [facility]   2/2/2023  8:00 AM Devang Schilling MD [facility]   10/17/2023  8:45 AM DONALD MISCELLANEOUS LUISITO [facility] AMB   10/24/2023  8:00 AM Giselle Abreu MD [facility]

## 2023-01-18 ENCOUNTER — HOSPITAL ENCOUNTER (OUTPATIENT)
Dept: PHYSICAL THERAPY | Age: 63
Setting detail: RECURRING SERIES
Discharge: HOME | End: 2023-01-21
Payer: COMMERCIAL

## 2023-01-18 PROCEDURE — 97530 THERAPEUTIC ACTIVITIES: CPT

## 2023-01-18 ASSESSMENT — PAIN SCALES - GENERAL: PAINLEVEL_OUTOF10: 0

## 2023-01-18 NOTE — THERAPY DISCHARGE
Dalila Davis  : 1960  Primary: [facility]  Secondary:  [address] @ [address] [address]  Phone: [phone]  Fax: [phone] Plan Frequency: 6 x per month  Plan of Care/Certification Expiration Date: 23      PT Visit Info: Total # of Visits Approved: 30  Total # of Visits to Date: 4  Progress Note Counter: 4      Visit Count:  4    OUTPATIENT PHYSICAL THERAPY:OP NOTE TYPE: Initial Assessment 2023               Episode  Appt Desk         Treatment Diagnosis:  Unsteadiness on feet (R26.81)  Dizziness and giddiness (R42)  Medical/Referring Diagnosis:  Dizziness [R42]  Unsteadiness on feet [R26.81]  Referring Physician:  Katie Starks MD MD Orders:  PT Eval and Treat   Return MD Appt:  unknown  Date of Onset:  Onset Date: 22     Allergies:  Patient has no known allergies. Restrictions/Precautions:           Medications Last Reviewed:  2023     SUBJECTIVE   History of Injury/Illness (Reason for Referral):   63year old left handed marrield white male feeling dizzy 2022 and ER 2022 and out on the . Dizzy with walking but more like I am moving. Sounds like more imbalance. No spinning. At first really bad and making me nauseous. Patient Stated Goal(s): \"My goal is to get past this imbalance. [employer]. Own company. 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 Once in awhile I will feel a little something. Way different than when I came in. Don't work a full day yet. Easing into it. \"   Initial:     0/10 Post Session:     0/10  Past Medical History/Comorbidities:    Mr. Miguel Angel Kaur  has no past medical history on file. Mr. Miguel Angel Kaur  has a past surgical history that includes Cardiac catheterization (08/15/2021) and Tonsillectomy ().   Social History/Living Environment:   Lives With: Spouse  Type of Home: House  Home Layout: Two level  Home Access: Level entry  Home Equipment: -- (Denies DME use.)     Prior Level of Function/Work/Activity:   Occupation: Self employed  ADL Assistance: Independent  Ambulation Assistance: Independent  Transfer Assistance: Independent  Active : Yes  Mode of Transportation: Car           Learning:   Does the patient/guardian have any barriers to learning?: No barriers  Will there be a co-learner?: No  What is the preferred language of the patient/guardian?: English  Is an  required?: No  How does the patient/guardian prefer to learn new concepts?: Listening; Reading; Demonstration; Pictures/Videos     Fall Risk Scale:   Chatterjee Total Score: 35  Chatterjee Fall Risk: Medium (25-44)         OBJECTIVE   Normal functional strength test  CTSIB eyes closed solid - mild to moderate AP sway.  Eyes closed foam - moderate sway.  Single leg stance 10 seconds each a little wobbly.     Date:  1/4/2023 Date:  1/18/2023 Date: Date:   Activity/Exercise Parameters Parameters Parameters Parameters   Tracking:  horizontal good WNL     Tracking:  Vertical ok WNL     Tracking:  Diagonal (D1) ok WNL     Tracking:  Diagonal (D2) ok WNL     Saccades:  Horizontal ok WNL     Saccades:  Vertical ok WNL     Saccades:  Diagonal (D1) ok WNL     Saccades:  Diagonal (D2) ok WNL     VOR1: horizontal Very slight WNL     VOR1:  vertical ok WNL     VOR2:  horizontal  Slow and slight symptoms WNL                ASSESSMENT   DISCHARGE NOTE:  Pt has attended 4 physical therapy sessions from 1/4/2023 to date including initial assessment.  Sessions consist of range of motion, therapeutic activity, therapeutic exercise, balance and gait refinement.  Pt has shown improvement in DIZZINESS and gait ability.   Pt has decreased his Dizziness Index Handicap to 6% disability indicating only occasional dizziness and much improved safety with all mobility and quality of life.  Pt is discharged at this time with all goals met.  Thank you for this referral.   Initial Assessment:  63year old s/p CVA with some residual dysequilibrium. No vertigo or dizziness per se more a feeling of being unsteady/on a boat. Gets a little more \"accelerated\" in the car. Not driving currently. CTSIB is abnormal and higher level vision is abnormal - VOR 2. Pt will benefit from skilled physical therapy to maximize strength, balance and gait ability to decrease risk for falls with possible injury or hospitalization. Problem List: (Impacting functional limitations): Body Structures, Functions, Activity Limitations Requiring Skilled Therapeutic Intervention: Decreased balance; Decreased vision/visual deficit; Vestibular Impairment     Therapy Prognosis:   Therapy Prognosis: Good     Assessment Complexity:      PLAN   Effective Dates: 1/4/2023 TO Plan of Care/Certification Expiration Date: 04/04/23     Frequency/Duration: Plan Frequency: 6 x per month     Interventions Planned (Treatment may consist of any combination of the following):    Current Treatment Recommendations: Strengthening; ROM; Balance training; Functional mobility training; Gait training; Stair training; Neuromuscular re-education; Manual; Return to work related activity; Home exercise program; Safety education & training; Vestibular rehab; Therapeutic activities     GOALS: (Goals have been discussed and agreed upon with patient.)  Long Term Functional Goals: Time Frame: 8 visits  Pt will be independent with range of motion, strength and balance home exercise program.STATUS:  MET  (1/18/2023)  Pt will have no more than 10% disability on the Dizziness Handicap Inventory indicating decreased dizziness and increased quality of life. STATUS:  MET  (1/18/2023)  Pt will increase Dynamic Gait Index to 23/24 indicating increase gait balance and decreased risk for falls. STATUS:  MET  (1/18/2023)       Outcome Measure:    Tool Used: Dynamic Gait Index  Score:  Initial: 21/24 Most Recent: 24/24 (Date: 1/18/2023 ) Interpretation of Score: Each section is scored on a 0-3 scale, 0 representing the patients inability to perform the task and 3 representing independence. The scores of each section are added together for a total score of 24. Any score below 19 indicates increased risk for falls. Tool Used: Dizziness Handicap Index  Score:  Initial: 56%  Most Recent:  6%(Date: 1/18/2023 )   Interpretation of Score: To each item, the following scores may be assigned: No = 0, Sometimes = 2, Yes = 4. Scores greater than 10 points should be referred to balance specialists for further evaluation. MEDICAL NECESSITY:   Patient is expected to demonstrate progress in strength, range of motion, balance, coordination, and functional technique to improve safety during all activities. REASON FOR SERVICES/OTHER COMMENTS:  Patient continues to require skilled intervention due to imbalance and vestibular deficits. THERAPEUTIC ACTIVITY: ( 10 minutes): Therapeutic activities per above to assess and improve balance and dizziness . Required no manual cues to  perform correctly . Total Duration:  Time In: 1515  Time Out: 1530    Regarding Jimi Kevin's therapy, I certify that the treatment plan above will be carried out by a therapist or under their direction.   Thank you for this referral,  Morales Nur PT     Referring Physician Signature: Jonathan Miles MD                    Post Session Pain  Charge Capture  PT Visit Info MD Guidelines  Tamie postop day 2 of hip reduction  dressing changes per ortho  PT recommending MARTHA  continue pain management  tylenol 1 gram Q8H  oxycodone 2.5/5mg prn mod/severe pain, BM control

## 2023-02-02 ENCOUNTER — OFFICE VISIT (OUTPATIENT)
Dept: CARDIOLOGY CLINIC | Age: 63
End: 2023-02-02
Payer: COMMERCIAL

## 2023-02-02 VITALS
SYSTOLIC BLOOD PRESSURE: 139 MMHG | HEIGHT: 72 IN | WEIGHT: 265 LBS | DIASTOLIC BLOOD PRESSURE: 86 MMHG | HEART RATE: 72 BPM | BODY MASS INDEX: 35.89 KG/M2

## 2023-02-02 DIAGNOSIS — E78.2 MIXED HYPERLIPIDEMIA: ICD-10-CM

## 2023-02-02 DIAGNOSIS — R42 DIZZINESS: ICD-10-CM

## 2023-02-02 DIAGNOSIS — I25.10 CORONARY ARTERY DISEASE INVOLVING NATIVE CORONARY ARTERY OF NATIVE HEART WITHOUT ANGINA PECTORIS: Primary | ICD-10-CM

## 2023-02-02 DIAGNOSIS — I10 PRIMARY HYPERTENSION: ICD-10-CM

## 2023-02-02 PROCEDURE — 99214 OFFICE O/P EST MOD 30 MIN: CPT | Performed by: INTERNAL MEDICINE

## 2023-02-02 PROCEDURE — 3079F DIAST BP 80-89 MM HG: CPT | Performed by: INTERNAL MEDICINE

## 2023-02-02 PROCEDURE — 3075F SYST BP GE 130 - 139MM HG: CPT | Performed by: INTERNAL MEDICINE

## 2023-02-02 ASSESSMENT — ENCOUNTER SYMPTOMS: SHORTNESS OF BREATH: 0

## 2023-02-02 NOTE — PROGRESS NOTES
[address] [place]  [address], [address]  PHONE: [phone]    Ewa Mas  1960      SUBJECTIVE:   Ewa Mas is a 63 y.o. male seen for a follow up visit regarding the following:     Chief Complaint   Patient presents with    Coronary Artery Disease    Hyperlipidemia    Cardiomyopathy       HPI:    Ewa Mas presents for routine follow up for known Coronary Artery Disease. Multiple issues addressed as outlined below:     Coronary Artery Disease:  Patient denies any recent angina. Notes compliance with medical therapy. No recent NTG use. Hypertension:  Ambulatory BP readings have been controlled. Patient reports compliance with medical therapy without side effects. Hyperlipidemia:   Cholesterol panel     Latest Reference Range & Units Most Recent   CHOLESTEROL, TOTAL, 830302 <200 MG/  12/28/22 15:58   HDL Cholesterol 40 - 60 MG/DL 42  12/28/22 15:58   LDL Calculated <100 MG/DL 68.2  12/28/22 15:58   Triglycerides 35 - 150 MG/  12/28/22 15:58       Possible CVA:  Admitted 12/30/22 with dizziness. Lasted 2 weeks. Did physical therapy. No follow up neurology scheduled. MRI   1. Single punctate focus of T2 hyperintensity within the right frontal lobe   white matter. Please see above. 2. Mild bilateral mastoid effusions. Echo (12/28/22):  EF 60-65%. Negative bubble study. Normal diastolic function. Past Medical History, Past Surgical History, Family history, Social History, and Medications were all reviewed with the patient today and updated as necessary.            Current Outpatient Medications:     aspirin 81 MG chewable tablet, Take 1 tablet by mouth daily, Disp: 30 tablet, Rfl: 3    pantoprazole (PROTONIX) 40 MG tablet, Take 1 tablet by mouth every morning (before breakfast), Disp: 30 tablet, Rfl: 3    ticagrelor (BRILINTA) 90 MG TABS tablet, Take 1 tablet by mouth daily, Disp: 60 tablet, Rfl: 3 atorvastatin (LIPITOR) 80 MG tablet, Take 1 tablet by mouth in the morning., Disp: 90 tablet, Rfl: 3    metoprolol succinate (TOPROL XL) 25 MG extended release tablet, Take 1 tablet by mouth in the morning., Disp: 90 tablet, Rfl: 3    nitroGLYCERIN (NITROSTAT) 0.4 MG SL tablet, Place 0.4 mg under the tongue, Disp: , Rfl:     clotrimazole (LOTRIMIN) 1 % cream, Apply topically 2 times daily Apply topically 2 times daily. (Patient not taking: Reported on 2/2/2023), Disp: 28 g, Rfl: 1     No Known Allergies     Patient Active Problem List    Diagnosis Date Noted    H/O: CVA (cerebrovascular accident) 01/05/2023     Priority: Medium    Dizziness 12/28/2022     Priority: Medium    Severe obesity (BMI 35.0-39. 9) with comorbidity ([address] 10/21/2022     Priority: Medium    Need for prophylactic vaccination and inoculation against influenza 10/21/2022     Priority: Medium    Tinea corporis 10/21/2022     Priority: Medium    Coronary atherosclerosis 08/14/2021     Priority: Medium     Formatting of this note might be different from the original.  1.  NSTEMI (8/2/21):  PCI of Ramus 2.5 x 38 mm Resolute [place] CHANELLE. Moderate LAD and OM stenosis with normal DFR. EF 45-50%. 2.  Echo (8/13/21):  EF 55-60%. No significant valve disease. Ischemic cardiomyopathy 09/15/2021     Priority: Low    Mixed hyperlipidemia 08/20/2021     Priority: Low    CAD (coronary artery disease) 08/14/2021     Priority: Low     1. NSTEMI (8/2/21):  PCI of Ramus 2.5 x 38 mm Resolute [place] CHANELLE. Moderate LAD and OM stenosis with normal DFR. EF 45-50%. 2.  Echo (8/13/21):  EF 55-60%. No significant valve disease. 3.  Echo (6/28/22): EF 67%. Normal diastolic function. Normal chamber sizes. AVSC. 4.  Echo (12/28/22):  EF 60-65%. Negative bubble study. Normal diastolic function.          Primary hypertension 08/13/2021     Priority: Low        Social History     Tobacco Use    Smoking status: Never    Smokeless tobacco: Never   Substance Use Topics    Alcohol use: Yes       ROS:    Review of Systems   Constitutional: Negative for malaise/fatigue. Cardiovascular:  Negative for chest pain. Respiratory:  Negative for shortness of breath. Musculoskeletal:  Positive for arthritis. Neurological:  Negative for focal weakness. Psychiatric/Behavioral:  Negative for depression. PHYSICAL EXAM:  Wt Readings from Last 3 Encounters:   02/02/23 265 lb (120.2 kg)   01/05/23 257 lb 14.4 oz (117 kg)   12/27/22 270 lb (122.5 kg)     BP Readings from Last 3 Encounters:   02/02/23 139/86   01/05/23 (!) 146/82   12/30/22 133/83     Pulse Readings from Last 3 Encounters:   02/02/23 72   01/05/23 84   12/30/22 67       Physical Exam  Constitutional:       General: He is not in acute distress. Neck:      Vascular: No carotid bruit. Cardiovascular:      Rate and Rhythm: Normal rate and regular rhythm. Pulmonary:      Effort: No respiratory distress. Breath sounds: Normal breath sounds. Abdominal:      General: Bowel sounds are normal.      Palpations: Abdomen is soft. Musculoskeletal:         General: No swelling. Skin:     General: Skin is warm and dry. Neurological:      General: No focal deficit present. Psychiatric:         Mood and Affect: Mood normal.       Medical problems and test results were reviewed with the patient today.        Lab Results   Component Value Date    WBC 8.0 12/30/2022    HGB 15.3 12/30/2022    HCT 45.2 12/30/2022    MCV 89.2 12/30/2022     12/30/2022       Lab Results   Component Value Date/Time     12/30/2022 07:34 AM    K 3.6 12/30/2022 07:34 AM     12/30/2022 07:34 AM    CO2 29 12/30/2022 07:34 AM    BUN 16 12/30/2022 07:34 AM    CREATININE 1.10 12/30/2022 07:34 AM    GLUCOSE 102 12/30/2022 07:34 AM    CALCIUM 8.9 12/30/2022 07:34 AM        Lab Results   Component Value Date    CHOL 131 12/28/2022     Lab Results   Component Value Date    TRIG 104 12/28/2022     Lab Results   Component Value Date    HDL 42 12/28/2022     Lab Results   Component Value Date    LDLCALC 68.2 12/28/2022     Lab Results   Component Value Date    LABVLDL 20.8 12/28/2022    VLDL 14 04/25/2022     Lab Results   Component Value Date    CHOLHDLRATIO 3.1 12/28/2022        Data from outside records/labs from outside providers have been reviewed and summarized as noted in the HPI, past history and data review sections of this note       ASSESSMENT and PLAN      1. Coronary artery disease involving native coronary artery of native heart without angina pectoris  Patient is doing well without any anginal symptoms. Continue Aspirin 81 mg a day and PRN NTG. We discussed the importance of continued risk factor modification. The patient is encouraged to contact my office with any worsening dyspnea or chest discomfort. 2. Primary hypertension  Currently BP appears to be under acceptable control on current therapy. Continue current medications including Toprol. Discussed monitoring ambulatory BP readings to ensure continued optimal management. If BP becomes elevated, patient is encouraged to contact my office for further titration of therapy. 3. Dizziness  Uncertain cause of his symptoms. Certainly appear more vertigo in nature than acute CVA. Punctate area in the frontal lobe which does not appear to account for his symptoms. I feel that he should follow-up with neurology for further evaluation and discussion about long-term antiplatelet therapy. He is currently on aspirin and Brilinta. The Brilinta was started in the hospital.  - [address] Neurology [facility]    4. Mixed hyperlipidemia  Lipids are at goal.  Continue Lipitor. Will continue current medical regimen and monitor for any side effects or complications of treatment. Discussed importance of healthy diet. Return in about 6 months (around 8/2/2023).        Vane Glass MD  2/2/2023  8:22 AM    This note may have been dictated using speech recognition software.  As a result, error of speech recognition may have occurred

## 2023-08-16 PROBLEM — I25.10 CORONARY ATHEROSCLEROSIS: Status: RESOLVED | Noted: 2021-08-14 | Resolved: 2023-08-16

## 2023-08-16 ASSESSMENT — ENCOUNTER SYMPTOMS: SHORTNESS OF BREATH: 0

## 2023-08-16 NOTE — PROGRESS NOTES
Substance Use Topics    Alcohol use: Yes       ROS:    Review of Systems   Constitutional: Negative for malaise/fatigue. Cardiovascular:  Negative for chest pain. Respiratory:  Negative for shortness of breath. Musculoskeletal:  Positive for arthritis. Neurological:  Negative for focal weakness. Psychiatric/Behavioral:  Negative for depression. PHYSICAL EXAM:  Wt Readings from Last 3 Encounters:   08/17/23 263 lb (119.3 kg)   02/02/23 265 lb (120.2 kg)   01/05/23 257 lb 14.4 oz (117 kg)     BP Readings from Last 3 Encounters:   08/17/23 125/80   02/02/23 139/86   01/05/23 (!) 146/82     Pulse Readings from Last 3 Encounters:   08/17/23 81   02/02/23 72   01/05/23 84       Physical Exam  Constitutional:       General: He is not in acute distress. Neck:      Vascular: No carotid bruit. Cardiovascular:      Rate and Rhythm: Normal rate and regular rhythm. Pulmonary:      Effort: No respiratory distress. Breath sounds: Normal breath sounds. Abdominal:      General: Bowel sounds are normal.      Palpations: Abdomen is soft. Musculoskeletal:         General: No swelling. Skin:     General: Skin is warm and dry. Neurological:      General: No focal deficit present. Psychiatric:         Mood and Affect: Mood normal.       Medical problems and test results were reviewed with the patient today.        Lab Results   Component Value Date    WBC 8.0 12/30/2022    HGB 15.3 12/30/2022    HCT 45.2 12/30/2022    MCV 89.2 12/30/2022     12/30/2022       Lab Results   Component Value Date/Time     12/30/2022 07:34 AM    K 3.6 12/30/2022 07:34 AM     12/30/2022 07:34 AM    CO2 29 12/30/2022 07:34 AM    BUN 16 12/30/2022 07:34 AM    CREATININE 1.10 12/30/2022 07:34 AM    GLUCOSE 102 12/30/2022 07:34 AM    CALCIUM 8.9 12/30/2022 07:34 AM        Lab Results   Component Value Date    CHOL 131 12/28/2022     Lab Results   Component Value Date    TRIG 104 12/28/2022     Lab Results

## 2023-08-17 ENCOUNTER — OFFICE VISIT (OUTPATIENT)
Age: 63
End: 2023-08-17
Payer: COMMERCIAL

## 2023-08-17 VITALS
BODY MASS INDEX: 35.62 KG/M2 | HEIGHT: 72 IN | DIASTOLIC BLOOD PRESSURE: 80 MMHG | SYSTOLIC BLOOD PRESSURE: 125 MMHG | WEIGHT: 263 LBS | HEART RATE: 81 BPM

## 2023-08-17 DIAGNOSIS — I10 PRIMARY HYPERTENSION: ICD-10-CM

## 2023-08-17 DIAGNOSIS — Z86.73 H/O: CVA (CEREBROVASCULAR ACCIDENT): ICD-10-CM

## 2023-08-17 DIAGNOSIS — E78.2 MIXED HYPERLIPIDEMIA: Primary | ICD-10-CM

## 2023-08-17 DIAGNOSIS — I25.110 CORONARY ARTERY DISEASE INVOLVING NATIVE CORONARY ARTERY OF NATIVE HEART WITH UNSTABLE ANGINA PECTORIS (HCC): ICD-10-CM

## 2023-08-17 DIAGNOSIS — I25.5 ISCHEMIC CARDIOMYOPATHY: ICD-10-CM

## 2023-08-17 LAB
ANION GAP SERPL CALC-SCNC: 9 MMOL/L (ref 2–11)
BUN SERPL-MCNC: 15 MG/DL (ref 8–23)
CALCIUM SERPL-MCNC: 8.8 MG/DL (ref 8.3–10.4)
CHLORIDE SERPL-SCNC: 110 MMOL/L (ref 101–110)
CO2 SERPL-SCNC: 23 MMOL/L (ref 21–32)
CREAT SERPL-MCNC: 1 MG/DL (ref 0.8–1.5)
ERYTHROCYTE [DISTWIDTH] IN BLOOD BY AUTOMATED COUNT: 12.5 % (ref 11.9–14.6)
GLUCOSE SERPL-MCNC: 101 MG/DL (ref 65–100)
HCT VFR BLD AUTO: 45.8 % (ref 41.1–50.3)
HGB BLD-MCNC: 15.4 G/DL (ref 13.6–17.2)
INR PPP: 1.1
MCH RBC QN AUTO: 30.6 PG (ref 26.1–32.9)
MCHC RBC AUTO-ENTMCNC: 33.6 G/DL (ref 31.4–35)
MCV RBC AUTO: 90.9 FL (ref 82–102)
NRBC # BLD: 0 K/UL (ref 0–0.2)
PLATELET # BLD AUTO: 212 K/UL (ref 150–450)
PMV BLD AUTO: 10.3 FL (ref 9.4–12.3)
POTASSIUM SERPL-SCNC: 4.3 MMOL/L (ref 3.5–5.1)
PROTHROMBIN TIME: 14.5 SEC (ref 12.6–14.3)
RBC # BLD AUTO: 5.04 M/UL (ref 4.23–5.6)
SODIUM SERPL-SCNC: 142 MMOL/L (ref 133–143)
WBC # BLD AUTO: 6.2 K/UL (ref 4.3–11.1)

## 2023-08-17 PROCEDURE — 3079F DIAST BP 80-89 MM HG: CPT | Performed by: INTERNAL MEDICINE

## 2023-08-17 PROCEDURE — 3074F SYST BP LT 130 MM HG: CPT | Performed by: INTERNAL MEDICINE

## 2023-08-17 PROCEDURE — 99214 OFFICE O/P EST MOD 30 MIN: CPT | Performed by: INTERNAL MEDICINE

## 2023-08-17 RX ORDER — ATORVASTATIN CALCIUM 80 MG/1
80 TABLET, FILM COATED ORAL DAILY
Qty: 90 TABLET | Refills: 3 | Status: SHIPPED | OUTPATIENT
Start: 2023-08-17

## 2023-08-17 RX ORDER — METOPROLOL SUCCINATE 25 MG/1
25 TABLET, EXTENDED RELEASE ORAL DAILY
Qty: 90 TABLET | Refills: 3 | Status: SHIPPED | OUTPATIENT
Start: 2023-08-17

## 2023-08-17 RX ORDER — NITROGLYCERIN 0.4 MG/1
0.4 TABLET SUBLINGUAL EVERY 5 MIN PRN
Qty: 25 TABLET | Refills: 11 | Status: SHIPPED | OUTPATIENT
Start: 2023-08-17

## 2023-08-17 NOTE — PROGRESS NOTES
Patient pre-assessment complete for [facility] poss with Dr Aubrie Mendez scheduled for 23, arrival time 9am. Patient verified using . Patient instructed to bring a list of all home medications on the day of procedure. NPO status reinforced. Patient informed to take a full dose aspirin 325mg  or 81 mg x 4 & brilinta on the day of procedure. Instructed they can take all other medications excluding vitamins & supplements. Patient verbalizes understanding of all instructions & denies any questions at this time.

## 2023-08-18 ENCOUNTER — HOSPITAL ENCOUNTER (OUTPATIENT)
Age: 63
Setting detail: OUTPATIENT SURGERY
Discharge: HOME | End: 2023-08-18
Attending: INTERNAL MEDICINE | Admitting: INTERNAL MEDICINE
Payer: COMMERCIAL

## 2023-08-18 VITALS
DIASTOLIC BLOOD PRESSURE: 77 MMHG | HEIGHT: 72 IN | WEIGHT: 263 LBS | BODY MASS INDEX: 35.62 KG/M2 | RESPIRATION RATE: 16 BRPM | SYSTOLIC BLOOD PRESSURE: 135 MMHG | TEMPERATURE: 98.5 F | HEART RATE: 70 BPM | OXYGEN SATURATION: 95 %

## 2023-08-18 DIAGNOSIS — I25.110 CORONARY ARTERY DISEASE INVOLVING NATIVE CORONARY ARTERY OF NATIVE HEART WITH UNSTABLE ANGINA PECTORIS (HCC): Primary | ICD-10-CM

## 2023-08-18 DIAGNOSIS — I25.10 CAD (CORONARY ARTERY DISEASE): ICD-10-CM

## 2023-08-18 LAB
ECHO BSA: 2.46 M2
EKG ATRIAL RATE: 77 BPM
EKG DIAGNOSIS: NORMAL
EKG P AXIS: 63 DEGREES
EKG P-R INTERVAL: 152 MS
EKG Q-T INTERVAL: 368 MS
EKG QRS DURATION: 86 MS
EKG QTC CALCULATION (BAZETT): 416 MS
EKG R AXIS: 36 DEGREES
EKG T AXIS: 34 DEGREES
EKG VENTRICULAR RATE: 77 BPM

## 2023-08-18 PROCEDURE — 2709999900 HC NON-CHARGEABLE SUPPLY: Performed by: INTERNAL MEDICINE

## 2023-08-18 PROCEDURE — 93458 L HRT ARTERY/VENTRICLE ANGIO: CPT | Performed by: INTERNAL MEDICINE

## 2023-08-18 PROCEDURE — 92928 PRQ TCAT PLMT NTRAC ST 1 LES: CPT | Performed by: INTERNAL MEDICINE

## 2023-08-18 PROCEDURE — C9600 PERC DRUG-EL COR STENT SING: HCPCS | Performed by: INTERNAL MEDICINE

## 2023-08-18 PROCEDURE — 2580000003 HC RX 258: Performed by: INTERNAL MEDICINE

## 2023-08-18 PROCEDURE — 93005 ELECTROCARDIOGRAM TRACING: CPT | Performed by: INTERNAL MEDICINE

## 2023-08-18 PROCEDURE — 6370000000 HC RX 637 (ALT 250 FOR IP): Performed by: INTERNAL MEDICINE

## 2023-08-18 PROCEDURE — 92978 ENDOLUMINL IVUS OCT C 1ST: CPT | Performed by: INTERNAL MEDICINE

## 2023-08-18 PROCEDURE — C1769 GUIDE WIRE: HCPCS | Performed by: INTERNAL MEDICINE

## 2023-08-18 PROCEDURE — 93010 ELECTROCARDIOGRAM REPORT: CPT | Performed by: INTERNAL MEDICINE

## 2023-08-18 PROCEDURE — 6360000002 HC RX W HCPCS: Performed by: INTERNAL MEDICINE

## 2023-08-18 PROCEDURE — C1887 CATHETER, GUIDING: HCPCS | Performed by: INTERNAL MEDICINE

## 2023-08-18 PROCEDURE — C1725 CATH, TRANSLUMIN NON-LASER: HCPCS | Performed by: INTERNAL MEDICINE

## 2023-08-18 PROCEDURE — 2500000003 HC RX 250 WO HCPCS: Performed by: INTERNAL MEDICINE

## 2023-08-18 PROCEDURE — C1753 CATH, INTRAVAS ULTRASOUND: HCPCS | Performed by: INTERNAL MEDICINE

## 2023-08-18 PROCEDURE — 99152 MOD SED SAME PHYS/QHP 5/>YRS: CPT | Performed by: INTERNAL MEDICINE

## 2023-08-18 PROCEDURE — 6360000004 HC RX CONTRAST MEDICATION: Performed by: INTERNAL MEDICINE

## 2023-08-18 PROCEDURE — 99153 MOD SED SAME PHYS/QHP EA: CPT | Performed by: INTERNAL MEDICINE

## 2023-08-18 PROCEDURE — C1874 STENT, COATED/COV W/DEL SYS: HCPCS | Performed by: INTERNAL MEDICINE

## 2023-08-18 PROCEDURE — C1894 INTRO/SHEATH, NON-LASER: HCPCS | Performed by: INTERNAL MEDICINE

## 2023-08-18 DEVICE — STENT ONYXNG40038UX ONYX 4.00X38RX
Type: IMPLANTABLE DEVICE | Site: HEART | Status: FUNCTIONAL
Brand: ONYX FRONTIER™

## 2023-08-18 RX ORDER — ASPIRIN 81 MG/1
324 TABLET, CHEWABLE ORAL DAILY
Status: DISCONTINUED | OUTPATIENT
Start: 2023-08-18 | End: 2023-08-18 | Stop reason: HOSPADM

## 2023-08-18 RX ORDER — MIDAZOLAM HYDROCHLORIDE 1 MG/ML
INJECTION INTRAMUSCULAR; INTRAVENOUS PRN
Status: DISCONTINUED | OUTPATIENT
Start: 2023-08-18 | End: 2023-08-18 | Stop reason: HOSPADM

## 2023-08-18 RX ORDER — HEPARIN SODIUM 200 [USP'U]/100ML
INJECTION, SOLUTION INTRAVENOUS CONTINUOUS PRN
Status: DISCONTINUED | OUTPATIENT
Start: 2023-08-18 | End: 2023-08-18 | Stop reason: HOSPADM

## 2023-08-18 RX ORDER — LIDOCAINE HYDROCHLORIDE 10 MG/ML
INJECTION, SOLUTION INFILTRATION; PERINEURAL PRN
Status: DISCONTINUED | OUTPATIENT
Start: 2023-08-18 | End: 2023-08-18 | Stop reason: HOSPADM

## 2023-08-18 RX ORDER — HEPARIN SODIUM 10000 [USP'U]/ML
INJECTION, SOLUTION INTRAVENOUS; SUBCUTANEOUS PRN
Status: DISCONTINUED | OUTPATIENT
Start: 2023-08-18 | End: 2023-08-18 | Stop reason: HOSPADM

## 2023-08-18 RX ORDER — NITROGLYCERIN 20 MG/100ML
INJECTION INTRAVENOUS PRN
Status: DISCONTINUED | OUTPATIENT
Start: 2023-08-18 | End: 2023-08-18 | Stop reason: HOSPADM

## 2023-08-18 RX ORDER — SODIUM CHLORIDE 9 MG/ML
INJECTION, SOLUTION INTRAVENOUS CONTINUOUS
Status: DISCONTINUED | OUTPATIENT
Start: 2023-08-18 | End: 2023-08-18 | Stop reason: HOSPADM

## 2023-08-18 RX ADMIN — SODIUM CHLORIDE: 9 INJECTION, SOLUTION INTRAVENOUS at 10:04

## 2023-08-18 RX ADMIN — TICAGRELOR 90 MG: 90 TABLET ORAL at 10:03

## 2023-08-18 NOTE — DISCHARGE INSTRUCTIONS
POST PCI/STENT DISCHARGE INSTRUCTIONS    1. Check puncture site frequently for swelling or bleeding. If there is any bleeding, lie down and apply pressure over the area with a clean towel or washcloth and call 911. Notify your doctor for any redness, swelling, drainage, or oozing from the puncture site. Notify your doctor for any fever or chills. 2. If the extremity becomes cold, numb, or painful call [facility] at [phone].    3. Activity should be limited for the next 48-72 hours. No heavy lifting (anything over 10 pounds) for 3 days. No pushing or pulling with your RIGHT WRIST for the next three days. 4.  You may resume your usual diet. Drink more fluids than usual.    5.  Have a responsible person drive you home and stay with you for at least 24 hours after your heart catheterization/angiography. No driving for 24 hours. 6. You may remove bandage from your RIGHT WRIST in 24 hours. You may shower in 24 hours. No tub baths, hot tubs, or swimming for 1 week. Do not place any lotions, creams, powders, or ointments over puncture site for 1 week. You may place a clean band-aid over the puncture site each day for 5 days. Change daily. YOU ARE BEING DISCHARGED ON TWO ANTI-PLATELET MEDICATIONS    1- ASPIRIN 81 MG DAILY    2- BRILINTA 90 MG TWICE DAILY    THESE MEDICATIONS  ARE VERY IMPORTANT TO YOUR RECOVERY AND  MUST BE TAKEN EXACTLY AS PRESCRIBED & NOT STOPPED FOR ANY REASON. THESE MAY ONLY BE STOPPED WITH AN ORDER FROM YOUR CARDIOLOGIST. PLEASE HAVE THESE FILLED IMMEDIATELY AND START TAKING AS PREVIOUSLY PRESCRIBED       YOU ARE ALSO BEING DISCHARGED ON A MEDICATION FOR CHOLESTEROL MANAGEMENT. 1- LIPITOR 80 MG DAILY      You have also been referred to [facility]. Someone from Cardiac Rehab will be calling you to set up a follow up appointment. If they have not called you within a week,  please call [phone].       Sedation for a Medical Procedure: Care Frequently Asked Questions section of the [facility] website at [email or web address] Remember, [facility] is NOT to be used for urgent needs. For medical emergencies, dial 911.

## 2023-08-18 NOTE — DISCHARGE SUMMARY
[facility] Discharge Summary     Patient ID:  Koki Méndez  [id number]  85 y.o.  1960    Admit date: 8/18/2023    Discharge date:  8/18/2023     Admitting Physician: Catracho Carr MD     Discharge Physician: Dr. Nellie Hudson    Admission Diagnoses: CAD (coronary artery disease) [I25.10]    Discharge Diagnoses:   Patient Active Problem List    Diagnosis    CAD s/p PCI    Ischemic cardiomyopathy    Mixed hyperlipidemia    Primary hypertension     Cardiology Procedures this admission:  Left heart catheterization with PCI  Consults: none    Hospital Course: Patient was seen at the office of [facility] by Dr. Nellie Hudson for complaints of neck pain with exertion consistent with prior angina and was subsequently scheduled for an AM Admission [facility] on 8/18/2023. Patient underwent cardiac catheterization by Dr. Nellie Hudson. Patient was found to have 99% stenosis of the mRCA that was stented with a 4.0 x 38 mm Bk with 0% residual stenosis. Patient tolerated the procedure well and was taken to the telemetry floor for recovery. The afternoon of discharge, patient was up feeling well without any complaints of chest pain or shortness of breath. Patient's right radial cath site was clean, dry and intact without hematoma or bruit. Patient's labs were stable. Patient was seen and examined by Dr. Nellie Hudson and determined stable and ready for discharge. Patient was instructed on the importance of medication compliance including taking aspirin and Brilinta everyday without missing a dose. After receiving drug eluting stents, the patient will remain on dual anti-platelet therapy for 1 year. For maximized medical therapy for CAD, patient will continue BB and high intensity statin as well. The patient will follow up with [facility] -- Dr. Nellie Hudson on Oct 26th at 8:00 am in [facility]. Patient has been referred to cardiac rehab.     DISPOSITION: The patient is being discharged

## 2023-08-18 NOTE — PROGRESS NOTES
Patient received to [facility] room # 16  Ambulatory from [facility]. Patient scheduled for [facility] today with Dr J Carlos Dee. Procedure reviewed & questions answered, voiced good understanding consent obtained & placed on chart. All medications and medical history reviewed. Will prep patient per orders. Patient & family updated on plan of care. The patient has a fraility score of 3-MANAGING WELL, based on ability to perform ADLS by self.

## 2023-08-18 NOTE — PROGRESS NOTES
Discharge instructions given. TR band removed from right wrist with no bleeding or swelling noted to site. 4x4 gauze with tegaderm applied to site. No complaints. Wife at bedside.

## 2023-08-18 NOTE — PROGRESS NOTES
TRANSFER - OUT REPORT:    Verbal report given to RN on Breanne Dominguez  being transferred to [facility] for routine progression of patient care       Report consisted of patient's Situation, Background, Assessment and   Recommendations(SBAR). Information from the following report(s) Nurse Handoff Report was reviewed with the receiving nurse. Lines:   Peripheral IV 08/18/23 Left Antecubital (Active)        Opportunity for questions and clarification was provided.       Patient transported with:  Registered Nurse    [facility] rebeka Vargas  1 stent to RCA  RRA -12    4 mg Versed  50 mcg Fentanyl  90 mg Brilinta  14,000 units Heparin

## 2023-08-18 NOTE — PROGRESS NOTES
Report received from [address] [place] [address] Procedural findings communicated. Intra procedural  medication administration reviewed. Progression of care discussed.      Patient received into [address] [place] [address] 6 post sheath removal.     Access site without bleeding or swelling yes    Dressing dry and intact yes    Patient instructed to limit movement to right upper extremity    Routine post procedural vital signs and site assessment initiated yes

## 2023-08-22 NOTE — PROGRESS NOTES
SAME DAY DISCHARGE FOLLOW UP PHONE CALL           NAME : Senthil Gonzalez           : 1960                    DATE/TIME:   23 (Sam)                           MRN# [id number]        1. Ensure that the patient has had their new prescriptions filled & ask if they have taken their anti-platelet & aspirin that day. Pt has taken asa & brilinta as ordered. Reinforced the importance of continuing to take both tf these medications daily & to not stop for any reason without discussing with the cardiologist first. Voice good understanding    2. Ask about access site. Have the patient assess for any signs of a hematoma. Ask about any pain at access site. Pt reports right radial doing ok, denies any bleeding swelling or pain      3. Ask the patient if they had experienced any chest pain or shortness of breath.     Reports feeling well, denies any chest pain or shortness of breath

## 2023-08-25 ENCOUNTER — OFFICE VISIT (OUTPATIENT)
Age: 63
End: 2023-08-25
Payer: COMMERCIAL

## 2023-08-25 VITALS
WEIGHT: 258 LBS | HEART RATE: 75 BPM | DIASTOLIC BLOOD PRESSURE: 76 MMHG | HEIGHT: 72 IN | SYSTOLIC BLOOD PRESSURE: 120 MMHG | BODY MASS INDEX: 34.95 KG/M2

## 2023-08-25 DIAGNOSIS — E78.2 MIXED HYPERLIPIDEMIA: ICD-10-CM

## 2023-08-25 DIAGNOSIS — I25.10 CORONARY ARTERY DISEASE INVOLVING NATIVE CORONARY ARTERY OF NATIVE HEART WITHOUT ANGINA PECTORIS: Primary | ICD-10-CM

## 2023-08-25 DIAGNOSIS — I10 PRIMARY HYPERTENSION: ICD-10-CM

## 2023-08-25 PROCEDURE — 3078F DIAST BP <80 MM HG: CPT | Performed by: INTERNAL MEDICINE

## 2023-08-25 PROCEDURE — 3074F SYST BP LT 130 MM HG: CPT | Performed by: INTERNAL MEDICINE

## 2023-08-25 PROCEDURE — 99214 OFFICE O/P EST MOD 30 MIN: CPT | Performed by: INTERNAL MEDICINE

## 2023-08-25 RX ORDER — PANTOPRAZOLE SODIUM 40 MG/1
40 TABLET, DELAYED RELEASE ORAL
Qty: 90 TABLET | Refills: 3 | Status: SHIPPED | OUTPATIENT
Start: 2023-08-25

## 2023-08-25 ASSESSMENT — ENCOUNTER SYMPTOMS: SHORTNESS OF BREATH: 0

## 2023-08-25 NOTE — PROGRESS NOTES
[address] [place]  [address] [facility], [facility], [address]  PHONE: [phone]    Rochelle Pantoja  1960      SUBJECTIVE:   Rochelle Pantoja is a 63 y.o. male seen for a follow up visit regarding the following:     Chief Complaint   Patient presents with    Hyperlipidemia    Coronary Artery Disease       HPI:    Patient presents for hospital follow-up. Was seen in office with chest pain consistent with unstable angina. Underwent cardiac catheterization which showed critical RCA stenosis treated with angioplasty and stenting. Unstable angina (8/18/2023): PCI of proximal to mid RCA with a 4.0 x 38 mm Bk drug-eluting stent. Patent ramus stent. Moderate LAD and obtuse marginal stenosis which are unchanged. EF 60-65%. He has had no complications at his catheterization site. No recurrent angina. Compliant with his dual antiplatelet therapy. He currently is taking Lipitor. LDL cholesterol at last check was 68. Past Medical History, Past Surgical History, Family history, Social History, and Medications were all reviewed with the patient today and updated as necessary.            Current Outpatient Medications:     ticagrelor (BRILINTA) 90 MG TABS tablet, Take 1 tablet by mouth 2 times daily, Disp: 180 tablet, Rfl: 3    pantoprazole (PROTONIX) 40 MG tablet, Take 1 tablet by mouth every morning (before breakfast), Disp: 90 tablet, Rfl: 3    Evolocumab (REPATHA) SOSY syringe, Inject 1 mL into the skin every 14 days, Disp: 2.1 mL, Rfl: 3    atorvastatin (LIPITOR) 80 MG tablet, Take 1 tablet by mouth daily, Disp: 90 tablet, Rfl: 3    metoprolol succinate (TOPROL XL) 25 MG extended release tablet, Take 1 tablet by mouth daily, Disp: 90 tablet, Rfl: 3    nitroGLYCERIN (NITROSTAT) 0.4 MG SL tablet, Place 1 tablet under the tongue every 5 minutes as needed for Chest pain, Disp: 25 tablet, Rfl: 11    aspirin 81 MG chewable tablet, Take 1 tablet by mouth daily, Disp: 30

## 2023-08-29 ENCOUNTER — TELEPHONE (OUTPATIENT)
Age: 63
End: 2023-08-29

## 2023-08-30 NOTE — TELEPHONE ENCOUNTER
[id number]. Lender Onur 140MG/ML is approved through 02/29/2024. Your patient may now fill this prescription and it will be covered. patient and pharmacy, Mario Alberto, notified//[facility]

## 2023-10-10 ENCOUNTER — APPOINTMENT (OUTPATIENT)
Dept: GENERAL RADIOLOGY | Age: 63
End: 2023-10-10
Payer: COMMERCIAL

## 2023-10-10 ENCOUNTER — HOSPITAL ENCOUNTER (EMERGENCY)
Age: 63
Discharge: HOME | End: 2023-10-10
Payer: COMMERCIAL

## 2023-10-10 VITALS
OXYGEN SATURATION: 96 % | WEIGHT: 255 LBS | BODY MASS INDEX: 34.54 KG/M2 | DIASTOLIC BLOOD PRESSURE: 95 MMHG | SYSTOLIC BLOOD PRESSURE: 162 MMHG | HEIGHT: 72 IN | TEMPERATURE: 97.3 F | RESPIRATION RATE: 16 BRPM | HEART RATE: 64 BPM

## 2023-10-10 DIAGNOSIS — M54.31 SCIATICA OF RIGHT SIDE: Primary | ICD-10-CM

## 2023-10-10 PROCEDURE — 96372 THER/PROPH/DIAG INJ SC/IM: CPT

## 2023-10-10 PROCEDURE — 6370000000 HC RX 637 (ALT 250 FOR IP): Performed by: PHYSICIAN ASSISTANT

## 2023-10-10 PROCEDURE — 72100 X-RAY EXAM L-S SPINE 2/3 VWS: CPT

## 2023-10-10 PROCEDURE — 99284 EMERGENCY DEPT VISIT MOD MDM: CPT

## 2023-10-10 PROCEDURE — 6360000002 HC RX W HCPCS: Performed by: PHYSICIAN ASSISTANT

## 2023-10-10 RX ORDER — METHOCARBAMOL 500 MG/1
750 TABLET, FILM COATED ORAL
Status: COMPLETED | OUTPATIENT
Start: 2023-10-10 | End: 2023-10-10

## 2023-10-10 RX ORDER — METHYLPREDNISOLONE 4 MG/1
TABLET ORAL
Qty: 1 KIT | Refills: 0 | Status: SHIPPED | OUTPATIENT
Start: 2023-10-10 | End: 2023-10-16

## 2023-10-10 RX ORDER — METHOCARBAMOL 750 MG/1
750 TABLET, FILM COATED ORAL 3 TIMES DAILY
Qty: 21 TABLET | Refills: 0 | Status: SHIPPED | OUTPATIENT
Start: 2023-10-10 | End: 2023-10-17

## 2023-10-10 RX ORDER — DEXAMETHASONE SODIUM PHOSPHATE 10 MG/ML
8 INJECTION INTRAMUSCULAR; INTRAVENOUS
Status: COMPLETED | OUTPATIENT
Start: 2023-10-10 | End: 2023-10-10

## 2023-10-10 RX ADMIN — METHOCARBAMOL TABLETS 750 MG: 500 TABLET, COATED ORAL at 13:30

## 2023-10-10 RX ADMIN — DEXAMETHASONE SODIUM PHOSPHATE 8 MG: 10 INJECTION INTRAMUSCULAR; INTRAVENOUS at 13:28

## 2023-10-10 ASSESSMENT — PATIENT HEALTH QUESTIONNAIRE - PHQ9
SUM OF ALL RESPONSES TO PHQ9 QUESTIONS 1 & 2: 0
2. FEELING DOWN, DEPRESSED OR HOPELESS: 0
SUM OF ALL RESPONSES TO PHQ QUESTIONS 1-9: 0
1. LITTLE INTEREST OR PLEASURE IN DOING THINGS: 0

## 2023-10-10 ASSESSMENT — PAIN DESCRIPTION - LOCATION
LOCATION: HIP;BACK;LEG
LOCATION: BACK

## 2023-10-10 ASSESSMENT — PAIN DESCRIPTION - ORIENTATION: ORIENTATION: RIGHT

## 2023-10-10 ASSESSMENT — PAIN SCALES - GENERAL
PAINLEVEL_OUTOF10: 8
PAINLEVEL_OUTOF10: 7

## 2023-10-10 ASSESSMENT — LIFESTYLE VARIABLES
HOW MANY STANDARD DRINKS CONTAINING ALCOHOL DO YOU HAVE ON A TYPICAL DAY: PATIENT DOES NOT DRINK
HOW OFTEN DO YOU HAVE A DRINK CONTAINING ALCOHOL: NEVER

## 2023-10-10 NOTE — DISCHARGE INSTRUCTIONS
Your XR did not show any sign of fracture or malalignment. I believe your symptoms are due to sciatica, a pinched nerve in your back. Take medications as prescribed. Follow-up with your primary care provider. Return to the ER for any new or worsening symptoms.

## 2023-10-10 NOTE — ED TRIAGE NOTES
Patient a 65 y/o Male reports to the ED with c/c of back pain that radiates down his right leg. State she washing his truck last Saturday when he bent over. States some numbness and tingling in his right leg. Denies any Hx of Sciatica.

## 2023-10-10 NOTE — ED PROVIDER NOTES
Emergency Department Provider Note       PCP: No primary care provider on file. Age: 66 y.o. Sex: male     DISPOSITION Decision To Discharge 10/10/2023 04:32:15 PM       ICD-10-CM    1. Sciatica of right side  M54.31           Medical Decision Making     Complexity of Problems Addressed:  Complexity of Problem: 1 acute, uncomplicated illness or injury. Data Reviewed and Analyzed:  I independently ordered and reviewed each unique test.  I reviewed external records: provider visit note from PCP. I interpreted the X-rays. No acute abnormality, agree with radiologist interpretation    Discussion of management or test interpretation. 59-year-old male presenting to the emergency department today for evaluation of right sided low back pain that radiates down the right leg. Patient is ambulatory without difficulty here in the emergency department. No signs of saddle anesthesia or bowel or bladder dysfunction. Neurovascularly intact in bilateral lower extremities. He does have pain along the right lumbar paraspinal region. His x-ray today does not show any acute abnormalities. I suspect symptoms may be due to sciatica, patient has never had this in the past.  We discussed this diagnosis as well as other differentials including muscle strain or spasm, less likely to be DVT, peripheral arterial disease, cauda equina. Will treat symptomatically, patient will follow-up with PCP and return to the ER for any new or worsening symptoms. ED Course as of 10/10/23 1856   Tue Oct 10, 2023   1631 XR LUMBAR SPINE (2-3 VIEWS) [KE]      ED Course User Index  [KE] Shermon Gaucher, PA       Risk of Complications and/or Morbidity of Patient Management:  Prescription drug management performed and Shared medical decision making was utilized in creating the patients health plan today. History      59-year-old male presenting to the emergency department for right lower back pain that radiates down the right leg.

## 2023-11-09 ENCOUNTER — OFFICE VISIT (OUTPATIENT)
Age: 63
End: 2023-11-09
Payer: COMMERCIAL

## 2023-11-09 VITALS
SYSTOLIC BLOOD PRESSURE: 138 MMHG | WEIGHT: 265 LBS | HEART RATE: 76 BPM | BODY MASS INDEX: 35.89 KG/M2 | HEIGHT: 72 IN | DIASTOLIC BLOOD PRESSURE: 81 MMHG

## 2023-11-09 DIAGNOSIS — E78.2 MIXED HYPERLIPIDEMIA: ICD-10-CM

## 2023-11-09 DIAGNOSIS — E66.01 SEVERE OBESITY (BMI 35.0-39.9) WITH COMORBIDITY (HCC): ICD-10-CM

## 2023-11-09 DIAGNOSIS — I25.10 CORONARY ARTERY DISEASE INVOLVING NATIVE CORONARY ARTERY OF NATIVE HEART WITHOUT ANGINA PECTORIS: Primary | ICD-10-CM

## 2023-11-09 DIAGNOSIS — I10 PRIMARY HYPERTENSION: ICD-10-CM

## 2023-11-09 DIAGNOSIS — Z86.73 H/O: CVA (CEREBROVASCULAR ACCIDENT): ICD-10-CM

## 2023-11-09 PROCEDURE — 93000 ELECTROCARDIOGRAM COMPLETE: CPT | Performed by: INTERNAL MEDICINE

## 2023-11-09 PROCEDURE — 99214 OFFICE O/P EST MOD 30 MIN: CPT | Performed by: INTERNAL MEDICINE

## 2023-11-09 PROCEDURE — 3075F SYST BP GE 130 - 139MM HG: CPT | Performed by: INTERNAL MEDICINE

## 2023-11-09 PROCEDURE — 3079F DIAST BP 80-89 MM HG: CPT | Performed by: INTERNAL MEDICINE

## 2023-11-09 ASSESSMENT — ENCOUNTER SYMPTOMS
BACK PAIN: 1
SHORTNESS OF BREATH: 0

## 2023-11-09 NOTE — PROGRESS NOTES
[address] [place]  [address] [facility], [facility], [address]  PHONE: [phone]    Cuba Forbes  1960      SUBJECTIVE:   Cuba Forbes is a 66 y.o. male seen for a follow up visit regarding the following:     Chief Complaint   Patient presents with    Coronary Artery Disease       HPI:    Cuba Forbes presents for routine follow up. Last sen 8/23. Multiple issues addressed. Coronary Artery Disease:  Patient denies any recent angina. Notes compliance with medical therapy. No recent NTG use. No intolerance to anti-platelet therapy. Hypertension:  Ambulatory BP readings have been controlled. Patient reports compliance with medical therapy without side effects. Hyperlipidemia:  Started on Repatha at last visit. He has obtained the prescription but has not started. Brings this with him to have a nurse show him how to administer the medication. H/O CVA:  No recurrent neurologic events. Exercise: Limited recently due to recent sciatica. Hold systolic results. Past Medical History, Past Surgical History, Family history, Social History, and Medications were all reviewed with the patient today and updated as necessary.            Current Outpatient Medications:     ticagrelor (BRILINTA) 90 MG TABS tablet, Take 1 tablet by mouth 2 times daily, Disp: 180 tablet, Rfl: 3    pantoprazole (PROTONIX) 40 MG tablet, Take 1 tablet by mouth every morning (before breakfast), Disp: 90 tablet, Rfl: 3    Evolocumab (REPATHA) SOSY syringe, Inject 1 mL into the skin every 14 days, Disp: 2.1 mL, Rfl: 3    atorvastatin (LIPITOR) 80 MG tablet, Take 1 tablet by mouth daily, Disp: 90 tablet, Rfl: 3    metoprolol succinate (TOPROL XL) 25 MG extended release tablet, Take 1 tablet by mouth daily, Disp: 90 tablet, Rfl: 3    nitroGLYCERIN (NITROSTAT) 0.4 MG SL tablet, Place 1 tablet under the tongue every 5 minutes as needed for Chest pain, Disp: 25 tablet, Rfl: 11

## 2024-01-01 SDOH — HEALTH STABILITY: PHYSICAL HEALTH: ON AVERAGE, HOW MANY DAYS PER WEEK DO YOU ENGAGE IN MODERATE TO STRENUOUS EXERCISE (LIKE A BRISK WALK)?: 2 DAYS

## 2024-01-01 SDOH — HEALTH STABILITY: PHYSICAL HEALTH: ON AVERAGE, HOW MANY MINUTES DO YOU ENGAGE IN EXERCISE AT THIS LEVEL?: 20 MIN

## 2024-01-01 NOTE — PROGRESS NOTES
Jimi Kevin is a 63 y.o. male who presents today for the following:  Chief Complaint   Patient presents with    New Patient     Previously saw Dr Hess       No Known Allergies    Current Outpatient Medications   Medication Sig Dispense Refill    ticagrelor (BRILINTA) 90 MG TABS tablet Take 1 tablet by mouth 2 times daily 180 tablet 3    pantoprazole (PROTONIX) 40 MG tablet Take 1 tablet by mouth every morning (before breakfast) 90 tablet 3    Evolocumab (REPATHA) SOSY syringe Inject 1 mL into the skin every 14 days 2.1 mL 3    atorvastatin (LIPITOR) 80 MG tablet Take 1 tablet by mouth daily 90 tablet 3    metoprolol succinate (TOPROL XL) 25 MG extended release tablet Take 1 tablet by mouth daily 90 tablet 3    nitroGLYCERIN (NITROSTAT) 0.4 MG SL tablet Place 1 tablet under the tongue every 5 minutes as needed for Chest pain 25 tablet 11    aspirin 81 MG chewable tablet Take 1 tablet by mouth daily 30 tablet 3    clotrimazole (LOTRIMIN) 1 % cream Apply topically 2 times daily Apply topically 2 times daily. 28 g 1     No current facility-administered medications for this visit.       Past Medical History:   Diagnosis Date    CAD (coronary artery disease)        Past Surgical History:   Procedure Laterality Date    CARDIAC CATHETERIZATION  08/15/2021    CARDIAC PROCEDURE N/A 8/18/2023    Left heart cath / coronary angiography performed by Wilmer Vargas MD at [facility] CARDIAC CATH LAB    CARDIAC PROCEDURE N/A 8/18/2023    Percutaneous coronary intervention performed by Wilmer Vargas MD at [facility] CARDIAC CATH LAB    TONSILLECTOMY  1972       Social History     Tobacco Use    Smoking status: Never     Passive exposure: Past    Smokeless tobacco: Never   Substance Use Topics    Alcohol use: Yes        Family History   Problem Relation Age of Onset    Cancer Paternal Grandfather     No Known Problems Mother     Heart Disease Father         CABG at age 47    No Known Problems Paternal Grandmother     Heart

## 2024-01-02 ENCOUNTER — OFFICE VISIT (OUTPATIENT)
Dept: FAMILY MEDICINE CLINIC | Facility: CLINIC | Age: 64
End: 2024-01-02
Payer: COMMERCIAL

## 2024-01-02 VITALS
HEIGHT: 72 IN | TEMPERATURE: 98 F | BODY MASS INDEX: 36.03 KG/M2 | HEART RATE: 78 BPM | WEIGHT: 266 LBS | OXYGEN SATURATION: 97 % | SYSTOLIC BLOOD PRESSURE: 130 MMHG | DIASTOLIC BLOOD PRESSURE: 78 MMHG

## 2024-01-02 DIAGNOSIS — L98.9 SKIN LESION OF FACE: ICD-10-CM

## 2024-01-02 DIAGNOSIS — E78.2 MIXED HYPERLIPIDEMIA: ICD-10-CM

## 2024-01-02 DIAGNOSIS — I10 PRIMARY HYPERTENSION: ICD-10-CM

## 2024-01-02 DIAGNOSIS — I10 PRIMARY HYPERTENSION: Primary | ICD-10-CM

## 2024-01-02 DIAGNOSIS — R42 DIZZINESS: ICD-10-CM

## 2024-01-02 DIAGNOSIS — Z12.11 COLON CANCER SCREENING: ICD-10-CM

## 2024-01-02 DIAGNOSIS — Z12.5 PROSTATE CANCER SCREENING: ICD-10-CM

## 2024-01-02 DIAGNOSIS — R73.9 HYPERGLYCEMIA, UNSPECIFIED: ICD-10-CM

## 2024-01-02 DIAGNOSIS — I25.10 CORONARY ARTERY DISEASE INVOLVING NATIVE CORONARY ARTERY OF NATIVE HEART WITHOUT ANGINA PECTORIS: ICD-10-CM

## 2024-01-02 DIAGNOSIS — K21.9 GASTROESOPHAGEAL REFLUX DISEASE, UNSPECIFIED WHETHER ESOPHAGITIS PRESENT: ICD-10-CM

## 2024-01-02 DIAGNOSIS — I25.5 ISCHEMIC CARDIOMYOPATHY: ICD-10-CM

## 2024-01-02 DIAGNOSIS — E66.01 SEVERE OBESITY (BMI 35.0-39.9) WITH COMORBIDITY (HCC): ICD-10-CM

## 2024-01-02 PROBLEM — Z23 NEED FOR PROPHYLACTIC VACCINATION AND INOCULATION AGAINST INFLUENZA: Status: RESOLVED | Noted: 2022-10-21 | Resolved: 2024-01-02

## 2024-01-02 LAB
ALBUMIN SERPL-MCNC: 3.8 G/DL (ref 3.2–4.6)
ALBUMIN/GLOB SERPL: 1.1 (ref 0.4–1.6)
ALP SERPL-CCNC: 85 U/L (ref 50–136)
ALT SERPL-CCNC: 30 U/L (ref 12–65)
ANION GAP SERPL CALC-SCNC: 4 MMOL/L (ref 2–11)
AST SERPL-CCNC: 16 U/L (ref 15–37)
BASOPHILS # BLD: 0.1 K/UL (ref 0–0.2)
BASOPHILS NFR BLD: 1 % (ref 0–2)
BILIRUB SERPL-MCNC: 0.5 MG/DL (ref 0.2–1.1)
BUN SERPL-MCNC: 19 MG/DL (ref 8–23)
CALCIUM SERPL-MCNC: 9.4 MG/DL (ref 8.3–10.4)
CHLORIDE SERPL-SCNC: 105 MMOL/L (ref 103–113)
CHOLEST SERPL-MCNC: 83 MG/DL
CO2 SERPL-SCNC: 28 MMOL/L (ref 21–32)
CREAT SERPL-MCNC: 1.2 MG/DL (ref 0.8–1.5)
DIFFERENTIAL METHOD BLD: NORMAL
EOSINOPHIL # BLD: 0.3 K/UL (ref 0–0.8)
EOSINOPHIL NFR BLD: 3 % (ref 0.5–7.8)
ERYTHROCYTE [DISTWIDTH] IN BLOOD BY AUTOMATED COUNT: 12.5 % (ref 11.9–14.6)
GLOBULIN SER CALC-MCNC: 3.4 G/DL (ref 2.8–4.5)
GLUCOSE SERPL-MCNC: 102 MG/DL (ref 65–100)
HBA1C MFR BLD: 5.7 %
HCT VFR BLD AUTO: 45.5 % (ref 41.1–50.3)
HDLC SERPL-MCNC: 46 MG/DL (ref 40–60)
HDLC SERPL: 1.8
HGB BLD-MCNC: 15.2 G/DL (ref 13.6–17.2)
IMM GRANULOCYTES # BLD AUTO: 0 K/UL (ref 0–0.5)
IMM GRANULOCYTES NFR BLD AUTO: 0 % (ref 0–5)
LDLC SERPL CALC-MCNC: 2.6 MG/DL
LYMPHOCYTES # BLD: 2.7 K/UL (ref 0.5–4.6)
LYMPHOCYTES NFR BLD: 34 % (ref 13–44)
MCH RBC QN AUTO: 30.4 PG (ref 26.1–32.9)
MCHC RBC AUTO-ENTMCNC: 33.4 G/DL (ref 31.4–35)
MCV RBC AUTO: 91 FL (ref 82–102)
MONOCYTES # BLD: 0.8 K/UL (ref 0.1–1.3)
MONOCYTES NFR BLD: 10 % (ref 4–12)
NEUTS SEG # BLD: 4.3 K/UL (ref 1.7–8.2)
NEUTS SEG NFR BLD: 52 % (ref 43–78)
NRBC # BLD: 0 K/UL (ref 0–0.2)
PLATELET # BLD AUTO: 277 K/UL (ref 150–450)
PMV BLD AUTO: 9.4 FL (ref 9.4–12.3)
POTASSIUM SERPL-SCNC: 4.2 MMOL/L (ref 3.5–5.1)
PROT SERPL-MCNC: 7.2 G/DL (ref 6.3–8.2)
PSA SERPL-MCNC: 2 NG/ML
RBC # BLD AUTO: 5 M/UL (ref 4.23–5.6)
SODIUM SERPL-SCNC: 137 MMOL/L (ref 136–146)
TRIGL SERPL-MCNC: 172 MG/DL (ref 35–150)
TSH W FREE THYROID IF ABNORMAL: 1.36 UIU/ML (ref 0.36–3.74)
VLDLC SERPL CALC-MCNC: 34.4 MG/DL (ref 6–23)
WBC # BLD AUTO: 8.1 K/UL (ref 4.3–11.1)

## 2024-01-02 PROCEDURE — 3078F DIAST BP <80 MM HG: CPT | Performed by: FAMILY MEDICINE

## 2024-01-02 PROCEDURE — 3075F SYST BP GE 130 - 139MM HG: CPT | Performed by: FAMILY MEDICINE

## 2024-01-02 PROCEDURE — 83036 HEMOGLOBIN GLYCOSYLATED A1C: CPT | Performed by: FAMILY MEDICINE

## 2024-01-02 PROCEDURE — 99396 PREV VISIT EST AGE 40-64: CPT | Performed by: FAMILY MEDICINE

## 2024-01-02 RX ORDER — PANTOPRAZOLE SODIUM 40 MG/1
40 TABLET, DELAYED RELEASE ORAL
Qty: 90 TABLET | Refills: 3 | Status: SHIPPED | OUTPATIENT
Start: 2024-01-02

## 2024-01-02 SDOH — ECONOMIC STABILITY: FOOD INSECURITY: WITHIN THE PAST 12 MONTHS, THE FOOD YOU BOUGHT JUST DIDN'T LAST AND YOU DIDN'T HAVE MONEY TO GET MORE.: NEVER TRUE

## 2024-01-02 SDOH — ECONOMIC STABILITY: HOUSING INSECURITY
IN THE LAST 12 MONTHS, WAS THERE A TIME WHEN YOU DID NOT HAVE A STEADY PLACE TO SLEEP OR SLEPT IN A SHELTER (INCLUDING NOW)?: NO

## 2024-01-02 SDOH — ECONOMIC STABILITY: INCOME INSECURITY: HOW HARD IS IT FOR YOU TO PAY FOR THE VERY BASICS LIKE FOOD, HOUSING, MEDICAL CARE, AND HEATING?: NOT HARD AT ALL

## 2024-01-02 SDOH — ECONOMIC STABILITY: FOOD INSECURITY: WITHIN THE PAST 12 MONTHS, YOU WORRIED THAT YOUR FOOD WOULD RUN OUT BEFORE YOU GOT MONEY TO BUY MORE.: NEVER TRUE

## 2024-01-02 ASSESSMENT — ENCOUNTER SYMPTOMS
ABDOMINAL PAIN: 0
CONSTIPATION: 0
CHEST TIGHTNESS: 0
SHORTNESS OF BREATH: 0
NAUSEA: 0
WHEEZING: 0
BACK PAIN: 1
DIARRHEA: 0

## 2024-01-02 ASSESSMENT — PATIENT HEALTH QUESTIONNAIRE - PHQ9
SUM OF ALL RESPONSES TO PHQ QUESTIONS 1-9: 0
1. LITTLE INTEREST OR PLEASURE IN DOING THINGS: 0
SUM OF ALL RESPONSES TO PHQ9 QUESTIONS 1 & 2: 0
SUM OF ALL RESPONSES TO PHQ QUESTIONS 1-9: 0
2. FEELING DOWN, DEPRESSED OR HOPELESS: 0
SUM OF ALL RESPONSES TO PHQ QUESTIONS 1-9: 0
SUM OF ALL RESPONSES TO PHQ QUESTIONS 1-9: 0

## 2024-01-03 NOTE — ASSESSMENT & PLAN NOTE
Denies current chest pain or increased shortness of breath.  Established with cardiology.  No repatha, statin, beta blocker, aspirin and brilinta.

## 2024-01-03 NOTE — ASSESSMENT & PLAN NOTE
Discussed health risks of obesity including increased risk of diabetes, sleep apnea, heart disease, cancer, arthritis.  Recommended Mediterranean diet and moderate intensity aerobic exercise to help with weight management.  If lifestyle changes are not helpful will discuss medication and or surgery to help with weight loss.

## 2024-01-04 ENCOUNTER — TELEPHONE (OUTPATIENT)
Age: 64
End: 2024-01-04

## 2024-01-05 NOTE — TELEPHONE ENCOUNTER
Request Reference Number: [id number]. REPATHA SURE INJ 140MG/ML is approved through 01/04/2025. Your patient may now fill this prescription and it will be covered. Patient notified//brendab

## 2024-01-23 RX ORDER — EVOLOCUMAB 140 MG/ML
INJECTION, SOLUTION SUBCUTANEOUS
Qty: 2 ML | Refills: 5 | Status: SHIPPED | OUTPATIENT
Start: 2024-01-23

## 2024-01-23 NOTE — TELEPHONE ENCOUNTER
Prescription sent to pharmacy[facility]  Requested Prescriptions     Signed Prescriptions Disp Refills    Evolocumab (REPATHA SURECLICK) 140 MG/ML SOAJ 2 mL 5     Sig: INJECT 1ML INTO THE SKIN EVERY 14 DAYS     Authorizing Provider: PALOMO JOHNSON     Ordering User: ERICKSON TORIBIO

## 2024-01-31 ENCOUNTER — CLINICAL DOCUMENTATION (OUTPATIENT)
Age: 64
End: 2024-01-31

## 2024-01-31 NOTE — PROGRESS NOTES
Repatha 140MG/ML    This medication or product was previously approved on [id number] from 2024-01-04 to 2025-01-04.

## 2024-05-09 ASSESSMENT — ENCOUNTER SYMPTOMS: SHORTNESS OF BREATH: 0

## 2024-05-10 ENCOUNTER — OFFICE VISIT (OUTPATIENT)
Age: 64
End: 2024-05-10
Payer: COMMERCIAL

## 2024-05-10 VITALS
WEIGHT: 262 LBS | HEART RATE: 80 BPM | HEIGHT: 72 IN | SYSTOLIC BLOOD PRESSURE: 122 MMHG | BODY MASS INDEX: 35.49 KG/M2 | DIASTOLIC BLOOD PRESSURE: 78 MMHG

## 2024-05-10 DIAGNOSIS — I10 PRIMARY HYPERTENSION: ICD-10-CM

## 2024-05-10 DIAGNOSIS — Z86.73 H/O: CVA (CEREBROVASCULAR ACCIDENT): ICD-10-CM

## 2024-05-10 DIAGNOSIS — I25.10 CORONARY ARTERY DISEASE INVOLVING NATIVE CORONARY ARTERY OF NATIVE HEART WITHOUT ANGINA PECTORIS: Primary | ICD-10-CM

## 2024-05-10 DIAGNOSIS — E78.2 MIXED HYPERLIPIDEMIA: ICD-10-CM

## 2024-05-10 DIAGNOSIS — I25.5 ISCHEMIC CARDIOMYOPATHY: ICD-10-CM

## 2024-05-10 PROCEDURE — 3078F DIAST BP <80 MM HG: CPT | Performed by: INTERNAL MEDICINE

## 2024-05-10 PROCEDURE — 99214 OFFICE O/P EST MOD 30 MIN: CPT | Performed by: INTERNAL MEDICINE

## 2024-05-10 PROCEDURE — 3074F SYST BP LT 130 MM HG: CPT | Performed by: INTERNAL MEDICINE

## 2024-05-10 RX ORDER — ATORVASTATIN CALCIUM 80 MG/1
80 TABLET, FILM COATED ORAL DAILY
Qty: 90 TABLET | Refills: 3 | Status: SHIPPED | OUTPATIENT
Start: 2024-05-10

## 2024-05-10 RX ORDER — METOPROLOL SUCCINATE 25 MG/1
25 TABLET, EXTENDED RELEASE ORAL DAILY
Qty: 90 TABLET | Refills: 3 | Status: SHIPPED | OUTPATIENT
Start: 2024-05-10

## 2024-05-10 NOTE — PROGRESS NOTES
(around 11/10/2024).       Wilmer Vargas MD  5/10/2024  8:27 AM    *Portions of the record have been created with voice recognition software. Occasional wrong-word or 'sound-a-like' substitutions may have occurred due to the inherent limitations of voice recognition software. Read the chart carefully and recognize, using context, where substitutions have occurred.

## 2024-07-07 NOTE — PROGRESS NOTES
Jimi Kevin is a 63 y.o. male who presents today for the following:  Chief Complaint   Patient presents with    Follow-up     Pt presents today for 6mo FU.        No Known Allergies    Current Outpatient Medications   Medication Sig Dispense Refill    atorvastatin (LIPITOR) 80 MG tablet Take 1 tablet by mouth daily 90 tablet 3    metoprolol succinate (TOPROL XL) 25 MG extended release tablet Take 1 tablet by mouth daily 90 tablet 3    ticagrelor (BRILINTA) 90 MG TABS tablet Take 1 tablet by mouth 2 times daily 180 tablet 3    Evolocumab (REPATHA SURECLICK) 140 MG/ML SOAJ INJECT 1ML INTO THE SKIN EVERY 14 DAYS 2 mL 5    pantoprazole (PROTONIX) 40 MG tablet Take 1 tablet by mouth every morning (before breakfast) 90 tablet 3    nitroGLYCERIN (NITROSTAT) 0.4 MG SL tablet Place 1 tablet under the tongue every 5 minutes as needed for Chest pain 25 tablet 11    aspirin 81 MG chewable tablet Take 1 tablet by mouth daily 30 tablet 3    clotrimazole (LOTRIMIN) 1 % cream Apply topically 2 times daily Apply topically 2 times daily. 28 g 1     No current facility-administered medications for this visit.       Past Medical History:   Diagnosis Date    CAD (coronary artery disease)        Past Surgical History:   Procedure Laterality Date    CARDIAC CATHETERIZATION  08/15/2021    CARDIAC PROCEDURE N/A 8/18/2023    Left heart cath / coronary angiography performed by Wilmer Vargas MD at [facility] CARDIAC CATH LAB    CARDIAC PROCEDURE N/A 8/18/2023    Percutaneous coronary intervention performed by Wilmer Vargas MD at [facility] CARDIAC CATH LAB    TONSILLECTOMY  1972       Social History     Tobacco Use    Smoking status: Never     Passive exposure: Past    Smokeless tobacco: Never   Substance Use Topics    Alcohol use: Not Currently        Family History   Problem Relation Age of Onset    Cancer Paternal Grandfather     No Known Problems Mother     Heart Disease Father         CABG at age 47    No Known Problems Paternal

## 2024-07-09 ENCOUNTER — OFFICE VISIT (OUTPATIENT)
Dept: FAMILY MEDICINE CLINIC | Facility: CLINIC | Age: 64
End: 2024-07-09
Payer: COMMERCIAL

## 2024-07-09 VITALS
BODY MASS INDEX: 36.21 KG/M2 | OXYGEN SATURATION: 96 % | WEIGHT: 267 LBS | SYSTOLIC BLOOD PRESSURE: 124 MMHG | HEART RATE: 65 BPM | TEMPERATURE: 98.5 F | DIASTOLIC BLOOD PRESSURE: 76 MMHG

## 2024-07-09 DIAGNOSIS — I10 PRIMARY HYPERTENSION: ICD-10-CM

## 2024-07-09 DIAGNOSIS — Z12.11 COLON CANCER SCREENING: ICD-10-CM

## 2024-07-09 DIAGNOSIS — I25.5 ISCHEMIC CARDIOMYOPATHY: ICD-10-CM

## 2024-07-09 DIAGNOSIS — K21.9 GASTROESOPHAGEAL REFLUX DISEASE, UNSPECIFIED WHETHER ESOPHAGITIS PRESENT: ICD-10-CM

## 2024-07-09 DIAGNOSIS — E78.2 MIXED HYPERLIPIDEMIA: ICD-10-CM

## 2024-07-09 DIAGNOSIS — Z86.73 H/O: CVA (CEREBROVASCULAR ACCIDENT): ICD-10-CM

## 2024-07-09 DIAGNOSIS — I25.10 CORONARY ARTERY DISEASE INVOLVING NATIVE CORONARY ARTERY OF NATIVE HEART WITHOUT ANGINA PECTORIS: Primary | ICD-10-CM

## 2024-07-09 DIAGNOSIS — E66.01 SEVERE OBESITY (BMI 35.0-39.9) WITH COMORBIDITY (HCC): ICD-10-CM

## 2024-07-09 PROCEDURE — 99214 OFFICE O/P EST MOD 30 MIN: CPT | Performed by: FAMILY MEDICINE

## 2024-07-09 PROCEDURE — 3078F DIAST BP <80 MM HG: CPT | Performed by: FAMILY MEDICINE

## 2024-07-09 PROCEDURE — 3074F SYST BP LT 130 MM HG: CPT | Performed by: FAMILY MEDICINE

## 2024-07-09 RX ORDER — PANTOPRAZOLE SODIUM 40 MG/1
40 TABLET, DELAYED RELEASE ORAL
Qty: 90 TABLET | Refills: 3 | Status: SHIPPED | OUTPATIENT
Start: 2024-07-09

## 2024-07-09 ASSESSMENT — ENCOUNTER SYMPTOMS
SHORTNESS OF BREATH: 0
CONSTIPATION: 0
WHEEZING: 0
BACK PAIN: 1
DIARRHEA: 0
CHEST TIGHTNESS: 0
NAUSEA: 0
ABDOMINAL PAIN: 0

## 2024-07-09 NOTE — ASSESSMENT & PLAN NOTE
Questionable CVA in 12/2022 with symptom of dizziness.  MRI showed single punctate focus of T2 hyperintensity in right frontal lobe.  Dizziness resolved with PT.

## 2024-07-09 NOTE — ASSESSMENT & PLAN NOTE
Denies current chest pain or increased shortness of breath.  Established with cardiology.  On repatha, statin, beta blocker, aspirin and brilinta.  Plans to stop brinlinta after year of treatment.

## 2024-08-08 ENCOUNTER — COMMUNITY OUTREACH (OUTPATIENT)
Dept: FAMILY MEDICINE CLINIC | Facility: CLINIC | Age: 64
End: 2024-08-08

## 2024-08-26 RX ORDER — EVOLOCUMAB 140 MG/ML
INJECTION, SOLUTION SUBCUTANEOUS
Qty: 2 ML | Refills: 0 | Status: SHIPPED | OUTPATIENT
Start: 2024-08-26

## 2024-08-26 NOTE — TELEPHONE ENCOUNTER
Prescription sent to pharmacy[facility]  Requested Prescriptions     Signed Prescriptions Disp Refills    REPATHA SURECLICK 140 MG/ML SOAJ 2 mL 0     Sig: INJECT 1ML SUBCUTANEOUSLY EVERY 14 DAYS     Authorizing Provider: PALOMO JOHNSON     Ordering User: ERICKSON TORIBIO

## 2024-09-18 RX ORDER — EVOLOCUMAB 140 MG/ML
INJECTION, SOLUTION SUBCUTANEOUS
Qty: 6 ADJUSTABLE DOSE PRE-FILLED PEN SYRINGE | Refills: 3 | Status: SHIPPED | OUTPATIENT
Start: 2024-09-18

## 2024-11-05 ASSESSMENT — ENCOUNTER SYMPTOMS: SHORTNESS OF BREATH: 0

## 2024-11-05 NOTE — PROGRESS NOTES
pain        Overall Impression    1. Coronary artery disease involving native coronary artery of native heart without angina pectoris  Patient is doing well without any anginal symptoms.  Continue Aspirin 81 mg a day and PRN NTG.  We discussed the importance of continued risk factor modification.  The patient is encouraged to contact my office with any worsening dyspnea or chest discomfort.   - EKG 12 Lead    2. Primary hypertension  Currently BP appears to be under acceptable control on current therapy.  Continue current medications including Toprol.   Discussed monitoring ambulatory BP readings to ensure continued optimal management.  If BP becomes elevated, patient is encouraged to contact my office for further titration of therapy.      - EKG 12 Lead  - metoprolol succinate (TOPROL XL) 25 MG extended release tablet; Take 1 tablet by mouth daily  Dispense: 90 tablet; Refill: 3    3. H/O: CVA (cerebrovascular accident)  No recurrent symptoms.  Continue aspirin therapy.  - EKG 12 Lead    4. Mixed hyperlipidemia  On aggressive therapy with Repatha and Lipitor.  Await upcoming labs with PCP  - EKG 12 Lead  - Evolocumab (REPATHA) SOSY syringe; Inject 1 mL into the skin every 14 days  Dispense: 2.1 mL; Refill: 5    5. Ischemic cardiomyopathy  Normal LV function.  Continue Toprol  - atorvastatin (LIPITOR) 80 MG tablet; Take 1 tablet by mouth daily  Dispense: 90 tablet; Refill: 3             Return in about 6 months (around 5/6/2025).       Wilmer Vargas MD  11/6/2024  8:38 AM    *Portions of the record have been created with voice recognition software. Occasional wrong-word or 'sound-a-like' substitutions may have occurred due to the inherent limitations of voice recognition software. Read the chart carefully and recognize, using context, where substitutions have occurred.

## 2024-11-06 ENCOUNTER — OFFICE VISIT (OUTPATIENT)
Age: 64
End: 2024-11-06
Payer: COMMERCIAL

## 2024-11-06 VITALS
HEIGHT: 72 IN | BODY MASS INDEX: 35.76 KG/M2 | HEART RATE: 70 BPM | WEIGHT: 264 LBS | SYSTOLIC BLOOD PRESSURE: 118 MMHG | DIASTOLIC BLOOD PRESSURE: 88 MMHG

## 2024-11-06 DIAGNOSIS — E78.2 MIXED HYPERLIPIDEMIA: ICD-10-CM

## 2024-11-06 DIAGNOSIS — I25.5 ISCHEMIC CARDIOMYOPATHY: ICD-10-CM

## 2024-11-06 DIAGNOSIS — I10 PRIMARY HYPERTENSION: ICD-10-CM

## 2024-11-06 DIAGNOSIS — I25.10 CORONARY ARTERY DISEASE INVOLVING NATIVE CORONARY ARTERY OF NATIVE HEART WITHOUT ANGINA PECTORIS: Primary | ICD-10-CM

## 2024-11-06 DIAGNOSIS — Z86.73 H/O: CVA (CEREBROVASCULAR ACCIDENT): ICD-10-CM

## 2024-11-06 PROCEDURE — 93000 ELECTROCARDIOGRAM COMPLETE: CPT | Performed by: INTERNAL MEDICINE

## 2024-11-06 PROCEDURE — 3074F SYST BP LT 130 MM HG: CPT | Performed by: INTERNAL MEDICINE

## 2024-11-06 PROCEDURE — 3079F DIAST BP 80-89 MM HG: CPT | Performed by: INTERNAL MEDICINE

## 2024-11-06 PROCEDURE — 99214 OFFICE O/P EST MOD 30 MIN: CPT | Performed by: INTERNAL MEDICINE

## 2024-11-06 RX ORDER — NITROGLYCERIN 0.4 MG/1
0.4 TABLET SUBLINGUAL EVERY 5 MIN PRN
Qty: 25 TABLET | Refills: 11 | Status: SHIPPED | OUTPATIENT
Start: 2024-11-06

## 2024-11-06 RX ORDER — METOPROLOL SUCCINATE 25 MG/1
25 TABLET, EXTENDED RELEASE ORAL DAILY
Qty: 90 TABLET | Refills: 3 | Status: SHIPPED | OUTPATIENT
Start: 2024-11-06

## 2024-11-06 RX ORDER — EVOLOCUMAB 140 MG/ML
INJECTION, SOLUTION SUBCUTANEOUS
Qty: 6 ADJUSTABLE DOSE PRE-FILLED PEN SYRINGE | Refills: 3 | Status: CANCELLED | OUTPATIENT
Start: 2024-11-06

## 2024-11-06 RX ORDER — ATORVASTATIN CALCIUM 80 MG/1
80 TABLET, FILM COATED ORAL DAILY
Qty: 90 TABLET | Refills: 3 | Status: SHIPPED | OUTPATIENT
Start: 2024-11-06

## 2024-11-06 RX ORDER — EVOLOCUMAB 140 MG/ML
140 INJECTION, SOLUTION SUBCUTANEOUS
Qty: 2.1 ML | Refills: 5 | Status: SHIPPED | OUTPATIENT
Start: 2024-11-06

## 2024-11-07 ENCOUNTER — CLINICAL DOCUMENTATION (OUTPATIENT)
Age: 64
End: 2024-11-07

## 2024-11-07 NOTE — PROGRESS NOTES
Repatha SureClick 140MG/ML auto-injectors     PA submitted.     Approved today by [employer] 2017 [facility]  Request Reference Number: [id number]. REPATHA SURE INJ 140MG/ML is approved through 11/07/2025.

## 2024-11-14 ENCOUNTER — TELEPHONE (OUTPATIENT)
Age: 64
End: 2024-11-14

## 2024-11-14 NOTE — TELEPHONE ENCOUNTER
Spoke with Maria Elena at [facility] pharmacy. She states that patient has to pay $537.00. prescription does go through, unsure if patient is in donut hole or if he has to pay his deductible. Patient needs to call insurance company//[email or web address]  Approved today by [employer]  Request Reference Number: [id number]. REPATHA SURE INJ 140MG/ML is approved through 11/07/2025.

## 2024-11-14 NOTE — TELEPHONE ENCOUNTER
Pt is calling saying GISELLE needs a PA for his Repatha ,He is asking if we can please call [phone] to get that and then call it in to [facility] [phone]  Any questions please call pt

## 2025-05-08 ENCOUNTER — OFFICE VISIT (OUTPATIENT)
Age: 65
End: 2025-05-08
Payer: COMMERCIAL

## 2025-05-08 VITALS
BODY MASS INDEX: 36.3 KG/M2 | SYSTOLIC BLOOD PRESSURE: 136 MMHG | HEIGHT: 72 IN | DIASTOLIC BLOOD PRESSURE: 80 MMHG | WEIGHT: 268 LBS | HEART RATE: 72 BPM

## 2025-05-08 DIAGNOSIS — I25.10 CORONARY ARTERY DISEASE INVOLVING NATIVE CORONARY ARTERY OF NATIVE HEART WITHOUT ANGINA PECTORIS: Primary | ICD-10-CM

## 2025-05-08 DIAGNOSIS — Z86.73 H/O: CVA (CEREBROVASCULAR ACCIDENT): ICD-10-CM

## 2025-05-08 DIAGNOSIS — I10 PRIMARY HYPERTENSION: ICD-10-CM

## 2025-05-08 DIAGNOSIS — E78.2 MIXED HYPERLIPIDEMIA: ICD-10-CM

## 2025-05-08 PROCEDURE — 99214 OFFICE O/P EST MOD 30 MIN: CPT | Performed by: INTERNAL MEDICINE

## 2025-05-08 PROCEDURE — 3075F SYST BP GE 130 - 139MM HG: CPT | Performed by: INTERNAL MEDICINE

## 2025-05-08 PROCEDURE — 3079F DIAST BP 80-89 MM HG: CPT | Performed by: INTERNAL MEDICINE

## 2025-05-08 ASSESSMENT — ENCOUNTER SYMPTOMS: SHORTNESS OF BREATH: 0

## 2025-05-08 NOTE — PROGRESS NOTES
MRI showing a single punctate focus of T2 hyperintensity within the right frontal lobe.  Felt nonspecific finding.          Past Medical History, Past Surgical History, Family history, Social History, and Medications were all reviewed with the patient today and updated as necessary.           Current Outpatient Medications:     atorvastatin (LIPITOR) 80 MG tablet, Take 1 tablet by mouth daily, Disp: 90 tablet, Rfl: 3    metoprolol succinate (TOPROL XL) 25 MG extended release tablet, Take 1 tablet by mouth daily, Disp: 90 tablet, Rfl: 3    nitroGLYCERIN (NITROSTAT) 0.4 MG SL tablet, Place 1 tablet under the tongue every 5 minutes as needed for Chest pain, Disp: 25 tablet, Rfl: 11    Evolocumab (REPATHA) SOSY syringe, Inject 1 mL into the skin every 14 days, Disp: 2.1 mL, Rfl: 5    Evolocumab (REPATHA SURECLICK) 140 MG/ML SOAJ, INJECT 1 ML SUBCUTANEOUSLY  EVERY TWO WEEKS, Disp: 6 Adjustable Dose Pre-filled Pen Syringe, Rfl: 3    pantoprazole (PROTONIX) 40 MG tablet, Take 1 tablet by mouth every morning (before breakfast), Disp: 90 tablet, Rfl: 3    aspirin 81 MG chewable tablet, Take 1 tablet by mouth daily, Disp: 30 tablet, Rfl: 3    clotrimazole (LOTRIMIN) 1 % cream, Apply topically 2 times daily Apply topically 2 times daily., Disp: 28 g, Rfl: 1     No Known Allergies     Patient Active Problem List    Diagnosis Date Noted    H/O: CVA (cerebrovascular accident) 01/05/2023     Priority: Medium     Patient was admitted in December 2022 with dizziness and had a abnormal MRI showing a single punctate focus of T2 hyperintensity within the right frontal lobe.  Felt nonspecific finding.  Was seen by neurology and started on Brilinta.        Dizziness 12/28/2022     Priority: Medium    Severe obesity (BMI 35.0-39.9) with comorbidity (HCC) 10/21/2022     Priority: Medium    Tinea corporis 10/21/2022     Priority: Medium    Ischemic cardiomyopathy 09/15/2021     Priority: Low    Mixed hyperlipidemia 08/20/2021     Priority:

## 2025-08-01 ENCOUNTER — OFFICE VISIT (OUTPATIENT)
Dept: FAMILY MEDICINE CLINIC | Facility: CLINIC | Age: 65
End: 2025-08-01
Payer: COMMERCIAL

## 2025-08-01 VITALS
WEIGHT: 268 LBS | TEMPERATURE: 98.5 F | HEART RATE: 64 BPM | SYSTOLIC BLOOD PRESSURE: 138 MMHG | HEIGHT: 72 IN | DIASTOLIC BLOOD PRESSURE: 80 MMHG | BODY MASS INDEX: 36.3 KG/M2 | OXYGEN SATURATION: 98 % | RESPIRATION RATE: 16 BRPM

## 2025-08-01 DIAGNOSIS — I10 PRIMARY HYPERTENSION: ICD-10-CM

## 2025-08-01 DIAGNOSIS — Z00.01 ENCOUNTER FOR ROUTINE ADULT HEALTH EXAMINATION WITH ABNORMAL FINDINGS: Primary | ICD-10-CM

## 2025-08-01 DIAGNOSIS — Z12.5 PROSTATE CANCER SCREENING: ICD-10-CM

## 2025-08-01 DIAGNOSIS — E78.2 MIXED HYPERLIPIDEMIA: ICD-10-CM

## 2025-08-01 DIAGNOSIS — E66.01 SEVERE OBESITY (BMI 35.0-39.9) WITH COMORBIDITY (HCC): ICD-10-CM

## 2025-08-01 DIAGNOSIS — Z86.73 H/O: CVA (CEREBROVASCULAR ACCIDENT): ICD-10-CM

## 2025-08-01 DIAGNOSIS — R73.09 BLOOD GLUCOSE ABNORMAL: ICD-10-CM

## 2025-08-01 DIAGNOSIS — Z12.11 COLON CANCER SCREENING: ICD-10-CM

## 2025-08-01 DIAGNOSIS — I25.10 CORONARY ARTERY DISEASE INVOLVING NATIVE CORONARY ARTERY OF NATIVE HEART WITHOUT ANGINA PECTORIS: ICD-10-CM

## 2025-08-01 LAB
ALBUMIN SERPL-MCNC: 3.7 G/DL (ref 3.2–4.6)
ALBUMIN/GLOB SERPL: 1.2 (ref 1–1.9)
ALP SERPL-CCNC: 83 U/L (ref 40–129)
ALT SERPL-CCNC: 25 U/L (ref 8–55)
ANION GAP SERPL CALC-SCNC: 12 MMOL/L (ref 7–16)
AST SERPL-CCNC: 21 U/L (ref 15–37)
BASOPHILS # BLD: 0.08 K/UL (ref 0–0.2)
BASOPHILS NFR BLD: 1.1 % (ref 0–2)
BILIRUB SERPL-MCNC: 0.5 MG/DL (ref 0–1.2)
BUN SERPL-MCNC: 17 MG/DL (ref 8–23)
CALCIUM SERPL-MCNC: 9.4 MG/DL (ref 8.8–10.2)
CHLORIDE SERPL-SCNC: 101 MMOL/L (ref 98–107)
CHOLEST SERPL-MCNC: 74 MG/DL (ref 0–200)
CO2 SERPL-SCNC: 25 MMOL/L (ref 20–29)
CREAT SERPL-MCNC: 1.11 MG/DL (ref 0.8–1.3)
DIFFERENTIAL METHOD BLD: NORMAL
EOSINOPHIL # BLD: 0.18 K/UL (ref 0–0.8)
EOSINOPHIL NFR BLD: 2.6 % (ref 0.5–7.8)
ERYTHROCYTE [DISTWIDTH] IN BLOOD BY AUTOMATED COUNT: 13 % (ref 11.9–14.6)
GLOBULIN SER CALC-MCNC: 3 G/DL (ref 2.3–3.5)
GLUCOSE SERPL-MCNC: 103 MG/DL (ref 70–99)
HBA1C MFR BLD: 5.5 %
HCT VFR BLD AUTO: 46.3 % (ref 41.1–50.3)
HDLC SERPL-MCNC: 46 MG/DL (ref 40–60)
HDLC SERPL: 1.6 (ref 0–5)
HGB BLD-MCNC: 15.2 G/DL (ref 13.6–17.2)
IMM GRANULOCYTES # BLD AUTO: 0.03 K/UL (ref 0–0.5)
IMM GRANULOCYTES NFR BLD AUTO: 0.4 % (ref 0–5)
LDLC SERPL CALC-MCNC: 15 MG/DL (ref 0–100)
LYMPHOCYTES # BLD: 2.12 K/UL (ref 0.5–4.6)
LYMPHOCYTES NFR BLD: 30.2 % (ref 13–44)
MCH RBC QN AUTO: 30.6 PG (ref 26.1–32.9)
MCHC RBC AUTO-ENTMCNC: 32.8 G/DL (ref 31.4–35)
MCV RBC AUTO: 93.2 FL (ref 82–102)
MONOCYTES # BLD: 0.78 K/UL (ref 0.1–1.3)
MONOCYTES NFR BLD: 11.1 % (ref 4–12)
NEUTS SEG # BLD: 3.82 K/UL (ref 1.7–8.2)
NEUTS SEG NFR BLD: 54.6 % (ref 43–78)
NRBC # BLD: 0 K/UL (ref 0–0.2)
PLATELET # BLD AUTO: 253 K/UL (ref 150–450)
PMV BLD AUTO: 9.8 FL (ref 9.4–12.3)
POTASSIUM SERPL-SCNC: 4.4 MMOL/L (ref 3.5–5.1)
PROT SERPL-MCNC: 6.7 G/DL (ref 6.3–8.2)
PSA SERPL-MCNC: 1.7 NG/ML (ref 0–4)
RBC # BLD AUTO: 4.97 M/UL (ref 4.23–5.6)
SODIUM SERPL-SCNC: 138 MMOL/L (ref 136–145)
TRIGL SERPL-MCNC: 67 MG/DL (ref 0–150)
VLDLC SERPL CALC-MCNC: 13 MG/DL (ref 6–23)
WBC # BLD AUTO: 7 K/UL (ref 4.3–11.1)

## 2025-08-01 PROCEDURE — 90471 IMMUNIZATION ADMIN: CPT | Performed by: FAMILY MEDICINE

## 2025-08-01 PROCEDURE — 83036 HEMOGLOBIN GLYCOSYLATED A1C: CPT | Performed by: FAMILY MEDICINE

## 2025-08-01 PROCEDURE — 3079F DIAST BP 80-89 MM HG: CPT | Performed by: FAMILY MEDICINE

## 2025-08-01 PROCEDURE — 90715 TDAP VACCINE 7 YRS/> IM: CPT | Performed by: FAMILY MEDICINE

## 2025-08-01 PROCEDURE — 3075F SYST BP GE 130 - 139MM HG: CPT | Performed by: FAMILY MEDICINE

## 2025-08-01 PROCEDURE — 99396 PREV VISIT EST AGE 40-64: CPT | Performed by: FAMILY MEDICINE

## 2025-08-01 SDOH — ECONOMIC STABILITY: FOOD INSECURITY: WITHIN THE PAST 12 MONTHS, THE FOOD YOU BOUGHT JUST DIDN'T LAST AND YOU DIDN'T HAVE MONEY TO GET MORE.: NEVER TRUE

## 2025-08-01 SDOH — ECONOMIC STABILITY: FOOD INSECURITY: WITHIN THE PAST 12 MONTHS, YOU WORRIED THAT YOUR FOOD WOULD RUN OUT BEFORE YOU GOT MONEY TO BUY MORE.: NEVER TRUE

## 2025-08-01 ASSESSMENT — PATIENT HEALTH QUESTIONNAIRE - PHQ9
SUM OF ALL RESPONSES TO PHQ QUESTIONS 1-9: 0
SUM OF ALL RESPONSES TO PHQ QUESTIONS 1-9: 0
SUM OF ALL RESPONSES TO PHQ9 QUESTIONS 1 & 2: 0
1. LITTLE INTEREST OR PLEASURE IN DOING THINGS: NOT AT ALL
SUM OF ALL RESPONSES TO PHQ QUESTIONS 1-9: 0
1. LITTLE INTEREST OR PLEASURE IN DOING THINGS: NOT AT ALL
2. FEELING DOWN, DEPRESSED OR HOPELESS: NOT AT ALL
SUM OF ALL RESPONSES TO PHQ QUESTIONS 1-9: 0
2. FEELING DOWN, DEPRESSED OR HOPELESS: NOT AT ALL

## 2025-08-01 ASSESSMENT — ENCOUNTER SYMPTOMS
SHORTNESS OF BREATH: 0
DIARRHEA: 0
CHEST TIGHTNESS: 0
WHEEZING: 0
CONSTIPATION: 0
NAUSEA: 0
ABDOMINAL PAIN: 0
BACK PAIN: 1

## 2025-08-01 NOTE — PROGRESS NOTES
Jimi Kevin is a 64 y.o. male who presents today for the following:  Chief Complaint   Patient presents with    Annual Exam     No complaints       No Known Allergies    Current Outpatient Medications   Medication Sig Dispense Refill    atorvastatin (LIPITOR) 80 MG tablet Take 1 tablet by mouth daily 90 tablet 3    metoprolol succinate (TOPROL XL) 25 MG extended release tablet Take 1 tablet by mouth daily 90 tablet 3    nitroGLYCERIN (NITROSTAT) 0.4 MG SL tablet Place 1 tablet under the tongue every 5 minutes as needed for Chest pain 25 tablet 11    Evolocumab (REPATHA) SOSY syringe Inject 1 mL into the skin every 14 days 2.1 mL 5    Evolocumab (REPATHA SURECLICK) 140 MG/ML SOAJ INJECT 1 ML SUBCUTANEOUSLY  EVERY TWO WEEKS 6 Adjustable Dose Pre-filled Pen Syringe 3    pantoprazole (PROTONIX) 40 MG tablet Take 1 tablet by mouth every morning (before breakfast) 90 tablet 3    aspirin 81 MG chewable tablet Take 1 tablet by mouth daily 30 tablet 3    clotrimazole (LOTRIMIN) 1 % cream Apply topically 2 times daily Apply topically 2 times daily. 28 g 1     No current facility-administered medications for this visit.       Past Medical History:   Diagnosis Date    CAD (coronary artery disease)        Past Surgical History:   Procedure Laterality Date    CARDIAC CATHETERIZATION  08/15/2021    CARDIAC PROCEDURE N/A 8/18/2023    Left heart cath / coronary angiography performed by Wilmer Vargas MD at [facility] CARDIAC CATH LAB    CARDIAC PROCEDURE N/A 8/18/2023    Percutaneous coronary intervention performed by Wilmer Vargas MD at [facility] CARDIAC CATH LAB    TONSILLECTOMY  1972       Social History     Tobacco Use    Smoking status: Never     Passive exposure: Past    Smokeless tobacco: Never   Substance Use Topics    Alcohol use: Not Currently        Family History   Problem Relation Age of Onset    Cancer Paternal Grandfather     No Known Problems Mother     Heart Disease Father         CABG at age 47    No Known

## 2025-08-04 ENCOUNTER — RESULTS FOLLOW-UP (OUTPATIENT)
Dept: FAMILY MEDICINE CLINIC | Facility: CLINIC | Age: 65
End: 2025-08-04

## 2025-08-04 ENCOUNTER — TELEPHONE (OUTPATIENT)
Dept: FAMILY MEDICINE CLINIC | Facility: CLINIC | Age: 65
End: 2025-08-04

## 2025-08-04 DIAGNOSIS — K21.9 GASTROESOPHAGEAL REFLUX DISEASE, UNSPECIFIED WHETHER ESOPHAGITIS PRESENT: ICD-10-CM

## 2025-08-04 RX ORDER — PANTOPRAZOLE SODIUM 40 MG/1
TABLET, DELAYED RELEASE ORAL
Qty: 90 TABLET | Refills: 3 | Status: SHIPPED | OUTPATIENT
Start: 2025-08-04

## (undated) DEVICE — PRESSURE GUIDEWIRE: Brand: COMET™ II

## (undated) DEVICE — NC TREK CORONARY DILATATION CATHETER 3.0 MM X 20 MM / RAPID-EXCHANGE: Brand: NC TREK

## (undated) DEVICE — CATH BLLN ANGIO 4X20MM NC EUPHORIA RX

## (undated) DEVICE — CATHETER COR DIAG PIGTAILS PIG 145 CRV 5FR 110CM 6 SIDE H

## (undated) DEVICE — TREK CORONARY DILATATION CATHETER 2.25 MM X 15 MM / RAPID-EXCHANGE: Brand: TREK

## (undated) DEVICE — CATHETER VASC 6 FR DIAG PGTL W/ SIDE H COR 110 CM LEN W/OUT

## (undated) DEVICE — PTCA DILATATION CATHETER: Brand: EMERGE™

## (undated) DEVICE — GUIDEWIRE VASC L260CM DIA0.035IN RAD 3MM J TIP L7CM PTFE

## (undated) DEVICE — COPILOT BLEEDBACK CONTROL VALVE: Brand: COPILOT

## (undated) DEVICE — CATHETER DIAG AD 6FR L100CM 0.038IN COR POLYUR JUDKINS R 5

## (undated) DEVICE — DEVICE COMPR LNG 27 CM VASC BND

## (undated) DEVICE — Device: Brand: PROWATER

## (undated) DEVICE — CATHETER GUID JR5 0.070 INX6 FRX100 CM VISTA BRT TIP

## (undated) DEVICE — GLIDESHEATH SLENDER STAINLESS STEEL KIT: Brand: GLIDESHEATH SLENDER

## (undated) DEVICE — NC TREK CORONARY DILATATION CATHETER 2.5 MM X 20 MM / RAPID-EXCHANGE: Brand: NC TREK

## (undated) DEVICE — GUIDEWIRE 035IN 210CM PTFE COAT FIX COR J TIP 15MM FIRM BODY

## (undated) DEVICE — CATHETER DIAG SM AD 6FR L100CM 0.038IN COR POLYUR JUDKINS L

## (undated) DEVICE — BAND RADIAL COMPR ARTERY 24CM -- REG BX/10

## (undated) DEVICE — CATH BLLN ANGIO 3X15MM SC EUPHORA RX

## (undated) DEVICE — CORONARY IMAGING CATHETER: Brand: OPTICROSS™ HD

## (undated) DEVICE — CATHETER GUID EXTRA BACKUP 3.5 0.070IN 6FR 100CM VISTA BRITE TIP

## (undated) DEVICE — RADIFOCUS OPTITORQUE ANGIOGRAPHIC CATHETER: Brand: OPTITORQUE

## (undated) DEVICE — GUIDEWIRE VASC J 3 CM 0.014 INX190 CM BAL MIDWT 1001780J